# Patient Record
Sex: FEMALE | Race: WHITE | NOT HISPANIC OR LATINO | Employment: OTHER | URBAN - METROPOLITAN AREA
[De-identification: names, ages, dates, MRNs, and addresses within clinical notes are randomized per-mention and may not be internally consistent; named-entity substitution may affect disease eponyms.]

---

## 2017-01-09 ENCOUNTER — APPOINTMENT (OUTPATIENT)
Dept: LAB | Facility: CLINIC | Age: 72
End: 2017-01-09
Payer: MEDICARE

## 2017-01-09 DIAGNOSIS — K21.9 GASTRO-ESOPHAGEAL REFLUX DISEASE WITHOUT ESOPHAGITIS: ICD-10-CM

## 2017-01-09 DIAGNOSIS — E03.9 HYPOTHYROIDISM: ICD-10-CM

## 2017-01-09 DIAGNOSIS — M81.0 AGE-RELATED OSTEOPOROSIS WITHOUT CURRENT PATHOLOGICAL FRACTURE: ICD-10-CM

## 2017-01-09 DIAGNOSIS — E78.5 HYPERLIPIDEMIA: ICD-10-CM

## 2017-01-09 LAB
ALBUMIN SERPL BCP-MCNC: 3.4 G/DL (ref 3.5–5)
ALP SERPL-CCNC: 58 U/L (ref 46–116)
ALT SERPL W P-5'-P-CCNC: 28 U/L (ref 12–78)
ANION GAP SERPL CALCULATED.3IONS-SCNC: 7 MMOL/L (ref 4–13)
AST SERPL W P-5'-P-CCNC: 19 U/L (ref 5–45)
BASOPHILS # BLD AUTO: 0.01 THOUSANDS/ΜL (ref 0–0.1)
BASOPHILS NFR BLD AUTO: 0 % (ref 0–1)
BILIRUB SERPL-MCNC: 0.52 MG/DL (ref 0.2–1)
BUN SERPL-MCNC: 16 MG/DL (ref 5–25)
CALCIUM SERPL-MCNC: 9.2 MG/DL (ref 8.3–10.1)
CHLORIDE SERPL-SCNC: 106 MMOL/L (ref 100–108)
CHOLEST SERPL-MCNC: 201 MG/DL (ref 50–200)
CO2 SERPL-SCNC: 29 MMOL/L (ref 21–32)
CREAT SERPL-MCNC: 0.68 MG/DL (ref 0.6–1.3)
EOSINOPHIL # BLD AUTO: 0.12 THOUSAND/ΜL (ref 0–0.61)
EOSINOPHIL NFR BLD AUTO: 3 % (ref 0–6)
ERYTHROCYTE [DISTWIDTH] IN BLOOD BY AUTOMATED COUNT: 13.7 % (ref 11.6–15.1)
GFR SERPL CREATININE-BSD FRML MDRD: >60 ML/MIN/1.73SQ M
GLUCOSE SERPL-MCNC: 94 MG/DL (ref 65–140)
HCT VFR BLD AUTO: 39.4 % (ref 34.8–46.1)
HDLC SERPL-MCNC: 93 MG/DL (ref 40–60)
HGB BLD-MCNC: 12.8 G/DL (ref 11.5–15.4)
LDLC SERPL CALC-MCNC: 88 MG/DL (ref 0–100)
LYMPHOCYTES # BLD AUTO: 1.23 THOUSANDS/ΜL (ref 0.6–4.47)
LYMPHOCYTES NFR BLD AUTO: 32 % (ref 14–44)
MCH RBC QN AUTO: 29.8 PG (ref 26.8–34.3)
MCHC RBC AUTO-ENTMCNC: 32.5 G/DL (ref 31.4–37.4)
MCV RBC AUTO: 92 FL (ref 82–98)
MONOCYTES # BLD AUTO: 0.29 THOUSAND/ΜL (ref 0.17–1.22)
MONOCYTES NFR BLD AUTO: 8 % (ref 4–12)
NEUTROPHILS # BLD AUTO: 2.14 THOUSANDS/ΜL (ref 1.85–7.62)
NEUTS SEG NFR BLD AUTO: 57 % (ref 43–75)
NRBC BLD AUTO-RTO: 0 /100 WBCS
PLATELET # BLD AUTO: 191 THOUSANDS/UL (ref 149–390)
PMV BLD AUTO: 12.3 FL (ref 8.9–12.7)
POTASSIUM SERPL-SCNC: 3.9 MMOL/L (ref 3.5–5.3)
PROT SERPL-MCNC: 7 G/DL (ref 6.4–8.2)
RBC # BLD AUTO: 4.3 MILLION/UL (ref 3.81–5.12)
SODIUM SERPL-SCNC: 142 MMOL/L (ref 136–145)
TRIGL SERPL-MCNC: 100 MG/DL
WBC # BLD AUTO: 3.8 THOUSAND/UL (ref 4.31–10.16)

## 2017-01-09 PROCEDURE — 85025 COMPLETE CBC W/AUTO DIFF WBC: CPT

## 2017-01-09 PROCEDURE — 80061 LIPID PANEL: CPT

## 2017-01-09 PROCEDURE — 80053 COMPREHEN METABOLIC PANEL: CPT

## 2017-01-09 PROCEDURE — 36415 COLL VENOUS BLD VENIPUNCTURE: CPT

## 2017-01-16 ENCOUNTER — ALLSCRIPTS OFFICE VISIT (OUTPATIENT)
Dept: OTHER | Facility: OTHER | Age: 72
End: 2017-01-16

## 2017-04-03 ENCOUNTER — ALLSCRIPTS OFFICE VISIT (OUTPATIENT)
Dept: OTHER | Facility: OTHER | Age: 72
End: 2017-04-03

## 2017-04-03 DIAGNOSIS — Z12.31 ENCOUNTER FOR SCREENING MAMMOGRAM FOR MALIGNANT NEOPLASM OF BREAST: ICD-10-CM

## 2017-04-04 ENCOUNTER — ALLSCRIPTS OFFICE VISIT (OUTPATIENT)
Dept: OTHER | Facility: OTHER | Age: 72
End: 2017-04-04

## 2017-04-07 ENCOUNTER — GENERIC CONVERSION - ENCOUNTER (OUTPATIENT)
Dept: OTHER | Facility: OTHER | Age: 72
End: 2017-04-07

## 2017-04-10 ENCOUNTER — GENERIC CONVERSION - ENCOUNTER (OUTPATIENT)
Dept: OTHER | Facility: OTHER | Age: 72
End: 2017-04-10

## 2017-04-12 ENCOUNTER — ALLSCRIPTS OFFICE VISIT (OUTPATIENT)
Dept: OTHER | Facility: OTHER | Age: 72
End: 2017-04-12

## 2017-04-17 ENCOUNTER — ALLSCRIPTS OFFICE VISIT (OUTPATIENT)
Dept: OTHER | Facility: OTHER | Age: 72
End: 2017-04-17

## 2017-05-03 ENCOUNTER — HOSPITAL ENCOUNTER (OUTPATIENT)
Dept: RADIOLOGY | Age: 72
Discharge: HOME | End: 2017-05-03
Payer: MEDICARE

## 2017-05-03 DIAGNOSIS — Z12.31 ENCOUNTER FOR SCREENING MAMMOGRAM FOR MALIGNANT NEOPLASM OF BREAST: ICD-10-CM

## 2017-05-03 DIAGNOSIS — M81.0 AGE-RELATED OSTEOPOROSIS WITHOUT CURRENT PATHOLOGICAL FRACTURE: ICD-10-CM

## 2017-05-03 PROCEDURE — G0202 SCR MAMMO BI INCL CAD: HCPCS

## 2017-05-04 ENCOUNTER — HOSPITAL ENCOUNTER (OUTPATIENT)
Dept: RADIOLOGY | Age: 72
Discharge: HOME | End: 2017-05-04
Payer: MEDICARE

## 2017-05-04 PROCEDURE — 77080 DXA BONE DENSITY AXIAL: CPT

## 2017-05-09 ENCOUNTER — GENERIC CONVERSION - ENCOUNTER (OUTPATIENT)
Dept: OTHER | Facility: OTHER | Age: 72
End: 2017-05-09

## 2017-06-21 ENCOUNTER — ALLSCRIPTS OFFICE VISIT (OUTPATIENT)
Dept: OTHER | Facility: OTHER | Age: 72
End: 2017-06-21

## 2017-06-21 DIAGNOSIS — E03.9 HYPOTHYROIDISM: ICD-10-CM

## 2017-06-28 ENCOUNTER — APPOINTMENT (OUTPATIENT)
Dept: LAB | Facility: CLINIC | Age: 72
End: 2017-06-28
Payer: MEDICARE

## 2017-06-28 ENCOUNTER — GENERIC CONVERSION - ENCOUNTER (OUTPATIENT)
Dept: OTHER | Facility: OTHER | Age: 72
End: 2017-06-28

## 2017-06-28 DIAGNOSIS — E03.9 HYPOTHYROIDISM: ICD-10-CM

## 2017-06-28 LAB — TSH SERPL DL<=0.05 MIU/L-ACNC: 11.8 UIU/ML (ref 0.36–3.74)

## 2017-06-28 PROCEDURE — 36415 COLL VENOUS BLD VENIPUNCTURE: CPT

## 2017-06-28 PROCEDURE — 84443 ASSAY THYROID STIM HORMONE: CPT

## 2017-08-28 DIAGNOSIS — E03.9 HYPOTHYROIDISM: ICD-10-CM

## 2017-08-30 ENCOUNTER — ALLSCRIPTS OFFICE VISIT (OUTPATIENT)
Dept: OTHER | Facility: OTHER | Age: 72
End: 2017-08-30

## 2017-09-01 ENCOUNTER — APPOINTMENT (OUTPATIENT)
Dept: LAB | Facility: CLINIC | Age: 72
End: 2017-09-01
Payer: MEDICARE

## 2017-09-01 ENCOUNTER — TRANSCRIBE ORDERS (OUTPATIENT)
Dept: LAB | Facility: CLINIC | Age: 72
End: 2017-09-01

## 2017-09-01 DIAGNOSIS — E03.9 HYPOTHYROIDISM: ICD-10-CM

## 2017-09-01 DIAGNOSIS — E03.9 UNSPECIFIED HYPOTHYROIDISM: Primary | ICD-10-CM

## 2017-09-01 LAB — TSH SERPL DL<=0.05 MIU/L-ACNC: 1.15 UIU/ML (ref 0.36–3.74)

## 2017-09-01 PROCEDURE — 84443 ASSAY THYROID STIM HORMONE: CPT

## 2017-09-01 PROCEDURE — 36415 COLL VENOUS BLD VENIPUNCTURE: CPT

## 2017-09-18 ENCOUNTER — ALLSCRIPTS OFFICE VISIT (OUTPATIENT)
Dept: OTHER | Facility: OTHER | Age: 72
End: 2017-09-18

## 2017-11-02 ENCOUNTER — ALLSCRIPTS OFFICE VISIT (OUTPATIENT)
Dept: OTHER | Facility: OTHER | Age: 72
End: 2017-11-02

## 2017-11-02 DIAGNOSIS — Z91.09 OTHER ALLERGY STATUS, OTHER THAN TO DRUGS AND BIOLOGICAL SUBSTANCES: ICD-10-CM

## 2017-11-02 DIAGNOSIS — R03.0 ELEVATED BLOOD PRESSURE READING WITHOUT DIAGNOSIS OF HYPERTENSION: ICD-10-CM

## 2017-11-02 DIAGNOSIS — E78.5 HYPERLIPIDEMIA: ICD-10-CM

## 2017-11-02 DIAGNOSIS — E03.9 HYPOTHYROIDISM: ICD-10-CM

## 2017-11-03 NOTE — PROGRESS NOTES
Assessment  1  Elevated blood pressure reading (796 2) (R03 0)    Plan  Allergy to environmental factors, Elevated blood pressure reading    · (1) CBC/PLT/DIFF; Status:Active; Requested LCN:[id number];    · (1) COMPREHENSIVE METABOLIC PANEL; Status:Active; Requested QHT:42NLS7433; Allergy to environmental factors, Elevated blood pressure reading, Hyperlipidemia,  Hypothyroidism    · (1) LIPID PANEL, FASTING; Status:Active; Requested [id number];     Discussion/Summary    1  Elevated blood pressure reading - without definite diagnosis of hypertension  Patient does have a history of white coat syndrome  Unclear accuracy of home blood pressures given that she did not bleed and will sit for at least 5 minutes before taking her blood pressures  At this point, I reviewed proper blood pressure monitoring technique have asked patient to check her blood pressure once or twice a day for the next week  She will call next Thursday or Friday with these results  Further recommendations based on these results  I will have patient get labs the interim  Patient will follow up as above  Patient to call for problems or concerns in the interim  The patient was counseled regarding instructions for management,-- risk factor reductions,-- prognosis,-- patient and family education,-- impressions,-- risks and benefits of treatment options,-- importance of compliance with treatment  Possible side effects of new medications were reviewed with the patient/guardian today  The treatment plan was reviewed with the patient/guardian  The patient/guardian understands and agrees with the treatment plan      Chief Complaint  bp issues      History of Present Illness  HPI: as abovept notes that her BP has been somewhat labile recently  BP was elevated at the dentist  Pt has been taking BP at home - ranging 120-150/  Mornings are typically good  May go up a little during the day - pt does not rest for 5 min before taking BP during the day  Home machine is accurate  pt denies CP, palp lightheadedness or other CV symptoms       Review of Systems    Constitutional: as noted in HPI    ENT: no ear ache, no loss of hearing, no nosebleeds or nasal discharge, no sore throat or hoarseness  Cardiovascular: no complaints of slow or fast heart rate, no chest pain, no palpitations, no leg claudication or lower extremity edema  Respiratory: no complaints of shortness of breath, no wheezing, no dyspnea on exertion, no orthopnea or PND  Gastrointestinal: no complaints of abdominal pain, no constipation, no nausea or diarrhea, no vomiting, no bloody stools  Genitourinary: no complaints of dysuria, no incontinence, no pelvic pain, no dysmenorrhea, no vaginal discharge or abnormal vaginal bleeding  Active Problems  1  Acute sinusitis (461 9) (J01 90)   2  Aftercare involving the use of plastic surgery (V51 8) (Z09)   3  Allergy to environmental factors (V15 09) (Z91 09)   4  Basal cell carcinoma of nasal tip (173 31) (C44 311)   5  Elevated blood pressure reading (796 2) (R03 0)   6  Encounter for gynecological examination (general) (routine) without abnormal findings   (V72 31) (Z01 419)   7  Encounter for routine gynecological examination with Papanicolaou smear of cervix   (V72 31,V76 2) (Z01 419)   8  Encounter for screening mammogram for malignant neoplasm of breast (V76 12)   (Z12 31)   9  Esophageal reflux (530 81) (K21 9)   10  Head congestion (478 19) (R09 81)   11  History of allergy (V15 09) (Z88 9)   12  History of Moh's micrographic surgery for skin cancer (V10 83) (Z85 828,Z98 890)   13  Hyperlipidemia (272 4) (E78 5)   14  Hypothyroidism (244 9) (E03 9)   15  Immunization due (V05 9) (Z23)   16  Malignant neoplasm without specification of site (199 1) (C80 1)   17  Medicare annual wellness visit, initial (V70 0) (Z00 00)   18  Medicare annual wellness visit, subsequent (V70 0) (Z00 00)   19  Need for influenza vaccination (V04 81) (Z23)   20  Nontoxic single thyroid nodule (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) (E04 1)   21  Osteoporosis (733 00) (M81 0)   22  Pruritus (698 9) (L29 9)   23  Screening for genitourinary condition (V81 6) (Z13 89)   24  Screening for neurological condition (V80 09) (Z13 89)   25  Sinusitis (473 9) (J32 9)   26  White coat syndrome with high blood pressure without hypertension (796 2) (R03 0)    Past Medical History  1  History of  0 (V49 89)   2  History of Hay fever (477 9) (J30 1)   3  History of acute sinusitis (V12 69) (Z87 09)   4  History of thyroid disease (V12 29) (Z86 39)  Active Problems And Past Medical History Reviewed: The active problems and past medical history were reviewed and updated today  Family History  Mother    1  Family history of Cancer   2  Family history of Colon Cancer (V16 0)  Father    3  Family history of Cancer   4  Family history of Carcinoma Of The Pancreas   5  Family history of Thyroid Cancer  Paternal Grandmother    10  Family history of Coronary Artery Disease (V17 49)    Social History   · Alcohol Use (History)   · Being A Social Drinker   · Daily Coffee Consumption (1  Cups/Day)   · Daily Cola Consumption (___ Cans/Day)   · Daily Tea Consumption (1  Cups/Day)   · Former smoker (V15 82) (Z87 891)   · Denied: History of H/O domestic violence   · Marital History - Currently    · No drug use   · Uses Safety Equipment - Seatbelts  The social history was reviewed and updated today  Surgical History  1  History Of Prior Surgery   2  History of Tonsillectomy    Current Meds   1  Atorvastatin Calcium 10 MG Oral Tablet; Take 1 tablet daily; Therapy: 95HAP1354 to (Last Rx:2017)  Requested for: 2017 Ordered   2  BL Vitamin C 500 MG TABS; Take 1 tablet daily; Therapy: (Recorded:87Dmm6766) to Recorded   3  Citracal Calcium+D TB24; take 2 tablet daily; Therapy: (Recorded:2017) to Recorded   4   Ibandronate Sodium 150 MG Oral Tablet; TAKE 1 TABLET ONCE A MONTH WITH 8 TO   10 OUNCES OF WATER STAY UPRIGHT AND DO NOT EAT OR DRINK FOR 1 HOUR;   Therapy: 77ALE0715 to (Last JN:93CGA4778)  Requested for: 51YPR3264 Ordered   5  Levothyroxine Sodium 100 MCG Oral Tablet; TAKE 1 TABLET DAILY; Therapy: 86UAP1079 to ()  Requested for: 86ETY2301; Last   Rx:33Pll7847 Ordered   6  Levothyroxine Sodium 88 MCG Oral Tablet; Take 1 tablet daily; Therapy: 61AWR4956 to (Last Zaira Sartorius)  Requested for: 39Xix0285 Ordered   7  Mometasone Furoate 50 MCG/ACT Nasal Suspension; USE 2 SPRAYS IN EACH   NOSTRIL EVERY MORNING; Therapy: 50MBE9053 to (Last Rx:74Dvn6610)  Requested for: 61Jpn6507 Ordered   8  Montelukast Sodium 10 MG Oral Tablet; Take 1 tablet daily; Therapy: 33SQW3403 to (Last Rx:03Bhg9101)  Requested for: 07YUR4597 Ordered   9  Multivitamins Oral Capsule; Therapy: (Recorded:15Cyj9063) to Recorded   10  Omeprazole 40 MG Oral Capsule Delayed Release; take 1 capsule daily; Therapy: 88CSG1304 to (Last Rx:83Xki6310)  Requested for: 51Flv4968 Ordered   11  Zyrtec TABS; Therapy: (Recorded:80Mcc7145) to Recorded    The medication list was reviewed and updated today  Allergies  1  Amoxicillin TABS   2  Avelox TABS    Vitals   Recorded: 17RHE2430 01:36PM   Temperature 98 5 F   Heart Rate 70   Systolic 246   Diastolic 82   Height 5 ft 3 in   Weight 166 lb 8 oz   BMI Calculated 29 49   BSA Calculated 1 79     Physical Exam    Constitutional   General appearance: No acute distress, well appearing and well nourished  Eyes   Conjunctiva and lids: No swelling, erythema or discharge  Pupils and irises: Equal, round and reactive to light  Ears, Nose, Mouth, and Throat   External inspection of ears and nose: Normal     Otoscopic examination: Tympanic membranes translucent with normal light reflex  Canals patent without erythema  Nasal mucosa, septum, and turbinates: Normal without edema or erythema  Oropharynx: Normal with no erythema, edema, exudate or lesions      Pulmonary Respiratory effort: No increased work of breathing or signs of respiratory distress  Auscultation of lungs: Clear to auscultation  Cardiovascular   Auscultation of heart: Normal rate and rhythm, normal S1 and S2, without murmurs  Examination of extremities for edema and/or varicosities: Normal     Carotid pulses: Normal     Lymphatic   Palpation of lymph nodes in neck: No lymphadenopathy  Musculoskeletal   Gait and station: Normal          Future Appointments    Date/Time Provider Specialty Site   01/16/2018 10:15 AM YOKO Yang   [address]     Signatures   Electronically signed by : YOKO Ramirez ; Nov 2 2017  6:25PM EST                       (Author)

## 2017-11-07 ENCOUNTER — LAB (OUTPATIENT)
Dept: LAB | Facility: CLINIC | Age: 72
End: 2017-11-07
Payer: MEDICARE

## 2017-11-07 DIAGNOSIS — E03.9 HYPOTHYROIDISM: ICD-10-CM

## 2017-11-07 DIAGNOSIS — R03.0 ELEVATED BLOOD PRESSURE READING WITHOUT DIAGNOSIS OF HYPERTENSION: ICD-10-CM

## 2017-11-07 DIAGNOSIS — Z91.09 OTHER ALLERGY STATUS, OTHER THAN TO DRUGS AND BIOLOGICAL SUBSTANCES: ICD-10-CM

## 2017-11-07 DIAGNOSIS — E78.5 HYPERLIPIDEMIA: ICD-10-CM

## 2017-11-07 LAB
ALBUMIN SERPL BCP-MCNC: 3.8 G/DL (ref 3.5–5)
ALP SERPL-CCNC: 58 U/L (ref 46–116)
ALT SERPL W P-5'-P-CCNC: 24 U/L (ref 12–78)
ANION GAP SERPL CALCULATED.3IONS-SCNC: 3 MMOL/L (ref 4–13)
AST SERPL W P-5'-P-CCNC: 17 U/L (ref 5–45)
BASOPHILS # BLD AUTO: 0.01 THOUSANDS/ΜL (ref 0–0.1)
BASOPHILS NFR BLD AUTO: 0 % (ref 0–1)
BILIRUB SERPL-MCNC: 0.71 MG/DL (ref 0.2–1)
BUN SERPL-MCNC: 15 MG/DL (ref 5–25)
CALCIUM SERPL-MCNC: 9.8 MG/DL (ref 8.3–10.1)
CHLORIDE SERPL-SCNC: 106 MMOL/L (ref 100–108)
CHOLEST SERPL-MCNC: 211 MG/DL (ref 50–200)
CO2 SERPL-SCNC: 29 MMOL/L (ref 21–32)
CREAT SERPL-MCNC: 0.71 MG/DL (ref 0.6–1.3)
EOSINOPHIL # BLD AUTO: 0.07 THOUSAND/ΜL (ref 0–0.61)
EOSINOPHIL NFR BLD AUTO: 2 % (ref 0–6)
ERYTHROCYTE [DISTWIDTH] IN BLOOD BY AUTOMATED COUNT: 13.1 % (ref 11.6–15.1)
GFR SERPL CREATININE-BSD FRML MDRD: 87 ML/MIN/1.73SQ M
GLUCOSE P FAST SERPL-MCNC: 99 MG/DL (ref 65–99)
HCT VFR BLD AUTO: 40.2 % (ref 34.8–46.1)
HDLC SERPL-MCNC: 87 MG/DL (ref 40–60)
HGB BLD-MCNC: 13 G/DL (ref 11.5–15.4)
LDLC SERPL CALC-MCNC: 103 MG/DL (ref 0–100)
LYMPHOCYTES # BLD AUTO: 1.35 THOUSANDS/ΜL (ref 0.6–4.47)
LYMPHOCYTES NFR BLD AUTO: 33 % (ref 14–44)
MCH RBC QN AUTO: 29.7 PG (ref 26.8–34.3)
MCHC RBC AUTO-ENTMCNC: 32.3 G/DL (ref 31.4–37.4)
MCV RBC AUTO: 92 FL (ref 82–98)
MONOCYTES # BLD AUTO: 0.29 THOUSAND/ΜL (ref 0.17–1.22)
MONOCYTES NFR BLD AUTO: 7 % (ref 4–12)
NEUTROPHILS # BLD AUTO: 2.31 THOUSANDS/ΜL (ref 1.85–7.62)
NEUTS SEG NFR BLD AUTO: 58 % (ref 43–75)
NRBC BLD AUTO-RTO: 0 /100 WBCS
PLATELET # BLD AUTO: 213 THOUSANDS/UL (ref 149–390)
PMV BLD AUTO: 11.8 FL (ref 8.9–12.7)
POTASSIUM SERPL-SCNC: 4.5 MMOL/L (ref 3.5–5.3)
PROT SERPL-MCNC: 7.4 G/DL (ref 6.4–8.2)
RBC # BLD AUTO: 4.38 MILLION/UL (ref 3.81–5.12)
SODIUM SERPL-SCNC: 138 MMOL/L (ref 136–145)
TRIGL SERPL-MCNC: 107 MG/DL
WBC # BLD AUTO: 4.04 THOUSAND/UL (ref 4.31–10.16)

## 2017-11-07 PROCEDURE — 80053 COMPREHEN METABOLIC PANEL: CPT

## 2017-11-07 PROCEDURE — 80061 LIPID PANEL: CPT

## 2017-11-07 PROCEDURE — 36415 COLL VENOUS BLD VENIPUNCTURE: CPT

## 2017-11-07 PROCEDURE — 85025 COMPLETE CBC W/AUTO DIFF WBC: CPT

## 2017-12-11 ENCOUNTER — ALLSCRIPTS OFFICE VISIT (OUTPATIENT)
Dept: OTHER | Facility: OTHER | Age: 72
End: 2017-12-11

## 2017-12-12 NOTE — PROGRESS NOTES
Assessment    1  Acute sinusitis (461 9) (J01 90)    Plan  Acute sinusitis    · Amoxicillin-Pot Clavulanate 875-125 MG Oral Tablet; TAKE 1 TABLET EVERY 12HOURS DAILY   · PredniSONE 20 MG Oral Tablet; 1 tab PO BID    Discussion/Summary    #1 acute sinusitis - I reviewed with pt  REC: start augmentin 875 bid with pred 20 mg bid x 5 days  Recheck 1 week if no change - earlier if worse  Pt to call for problems or concerns in the interim  The patient was counseled regarding instructions for management,-- risk factor reductions,-- prognosis,-- patient and family education,-- impressions,-- risks and benefits of treatment options,-- importance of compliance with treatment  Possible side effects of new medications were reviewed with the patient/guardian today  The treatment plan was reviewed with the patient/guardian  The patient/guardian understands and agrees with the treatment plan      Chief Complaint    1  Cold Symptoms    History of Present Illness  HPI: as abovept with R ear congestion and intermittent sinus pain x 4-6 weeks  Pt finished a course of Amox and was feeling better (not resolved) but symptoms returned Sunday  (+) chills and fatigue  No productive cough  Review of Systems   Constitutional: as noted in HPI   ENT: as noted in HPI  Cardiovascular: no complaints of slow or fast heart rate, no chest pain, no palpitations, no leg claudication or lower extremity edema  Respiratory: no complaints of shortness of breath, no wheezing, no dyspnea on exertion, no orthopnea or PND  Musculoskeletal: no complaints of arthralgia, no myalgia, no joint swelling or stiffness, no limb pain or swelling  Integumentary: no complaints of skin rash or lesion, no itching or dry skin, no skin wounds  Active Problems    1  Acute sinusitis (461 9) (J01 90)   2  Aftercare involving the use of plastic surgery (V51 8) (Z09)   3  Allergy to environmental factors (V15 09) (Z91 09)   4   Basal cell carcinoma of nasal tip (173 31) (C44 311)   5  Elevated blood pressure reading (796 2) (R03 0)   6  Encounter for gynecological examination (general) (routine) without abnormal findings (V72 31) (Z01 419)   7  Encounter for routine gynecological examination with Papanicolaou smear of cervix (V72 31,V76 2) (Z01 419)   8  Encounter for screening mammogram for malignant neoplasm of breast (V76 12) (Z12 31)   9  Esophageal reflux (530 81) (K21 9)   10  Flu vaccine need (V04 81) (Z23)   11  Head congestion (478 19) (R09 81)   12  History of allergy (V15 09) (Z88 9)   13  History of Moh's micrographic surgery for skin cancer (V10 83) (Z85 828,Z98 890)   14  Hyperlipidemia (272 4) (E78 5)   15  Hypothyroidism (244 9) (E03 9)   16  Immunization due (V05 9) (Z23)   17  Malignant neoplasm without specification of site (199 1) (C80 1)   18  Medicare annual wellness visit, initial (V70 0) (Z00 00)   19  Medicare annual wellness visit, subsequent (V70 0) (Z00 00)   20  Need for influenza vaccination (V04 81) (Z23)   21  Nontoxic single thyroid nodule (241 0) (E04 1)   22  Osteoporosis (733 00) (M81 0)   23  Pruritus (698 9) (L29 9)   24  Screening for genitourinary condition (V81 6) (Z13 89)   25  Screening for neurological condition (V80 09) (Z13 89)   26  Sinusitis (473 9) (J32 9)   27  White coat syndrome with high blood pressure without hypertension (796 2) (R03 0)    Past Medical History  1  History of  0 (V49 89)   2  History of Hay fever (477 9) (J30 1)   3  History of acute sinusitis (V12 69) (Z87 09)   4  History of thyroid disease (V12 29) (Z86 39)  Active Problems And Past Medical History Reviewed: The active problems and past medical history were reviewed and updated today  Family History  Mother    1  Family history of Cancer   2  Family history of Colon Cancer (V16 0)  Father    3  Family history of Cancer   4  Family history of Carcinoma Of The Pancreas   5  Family history of Thyroid Cancer  Paternal Grandmother    10  Family history of Coronary Artery Disease (V17 49)    Social History   · Alcohol Use (History)   · Being A Social Drinker   · Daily Coffee Consumption (1  Cups/Day)   · Daily Cola Consumption (___ Cans/Day)   · Daily Tea Consumption (1  Cups/Day)   · Former smoker (V15 82) (Z87 501)   · Denied: History of H/O domestic violence   · Marital History - Currently    · No drug use   · Uses Safety Equipment - Seatbelts  The social history was reviewed and updated today  Surgical History    1  History Of Prior Surgery   2  History of Tonsillectomy    Current Meds   1  Atorvastatin Calcium 10 MG Oral Tablet; Take 1 tablet daily; Therapy: 50QFV3874 to (Last Rx:45Kiv2197)  Requested for: 94Dyx0387 Ordered   2  BL Vitamin C 500 MG TABS; Take 1 tablet daily; Therapy: (Recorded:77Lrl7670) to Recorded   3  Citracal Calcium+D TB24; take 2 tablet daily; Therapy: (Recorded:87Blv9455) to Recorded   4  Ibandronate Sodium 150 MG Oral Tablet; TAKE 1 TABLET ONCE A MONTH WITH 8 TO 10 OUNCES OF WATER STAY UPRIGHT AND DO NOT EAT OR DRINK FOR 1 HOUR; Therapy: 70MNG1184 to (Last UI:67UMT4258)  Requested for: 36FSY9276 Ordered   5  Levothyroxine Sodium 100 MCG Oral Tablet; Take 1 tablet daily; Therapy: 47BTV3263 to (Last GREGORY:69WZY9093)  Requested for: 61JZQ8055 Ordered   6  Levothyroxine Sodium 88 MCG Oral Tablet; Take 1 tablet daily; Therapy: 85ITS1975 to (Last [facility])  Requested for: 28Eau7132 Ordered   7  Mometasone Furoate 50 MCG/ACT Nasal Suspension; USE 2 SPRAYS IN EACH NOSTRIL EVERY MORNING; Therapy: 60ONA9658 to (Last 781 1704)  Requested for: W9442949 Ordered   8  Montelukast Sodium 10 MG Oral Tablet; Take 1 tablet daily; Therapy: 98NIV5218 to (Last Rx:59Jar8398)  Requested for: 59YVZ1684 Ordered   9  Multivitamins Oral Capsule; Therapy: (Recorded:12Uzt1217) to Recorded   10  Omeprazole 40 MG Oral Capsule Delayed Release; take 1 capsule daily;   Therapy: 41YBQ6642 to (Last Rx:74Quk8644)  Requested for: 20Zqz5018 Ordered   11  Zyrtec TABS; Therapy: (Recorded:17Kaq9868) to Recorded    The medication list was reviewed and updated today  Allergies  1  Amoxicillin TABS   2  Avelox TABS    Vitals   Recorded: 50Ulv5479 03:27PM   Temperature 98 5 F   Heart Rate 64   Systolic 996   Diastolic 70   Height 5 ft 3 in   Weight 167 lb 4 oz   BMI Calculated 29 63   BSA Calculated 1 79       Physical Exam   Constitutional  General appearance: No acute distress, well appearing and well nourished  Eyes  Conjunctiva and lids: No swelling, erythema or discharge  Pupils and irises: Equal, round and reactive to light  Ears, Nose, Mouth, and Throat  External inspection of ears and nose: Normal    Otoscopic examination: Tympanic membranes translucent with normal light reflex  Canals patent without erythema  Nasal mucosa, septum, and turbinates: Abnormal  -- nose congested with scant thick discharge  Maxillary sinuses TTP  Oropharynx: Normal with no erythema, edema, exudate or lesions  Pulmonary  Respiratory effort: No increased work of breathing or signs of respiratory distress  Auscultation of lungs: Clear to auscultation  Cardiovascular  Auscultation of heart: Normal rate and rhythm, normal S1 and S2, without murmurs  Lymphatic  Palpation of lymph nodes in neck: No lymphadenopathy  Future Appointments    Date/Time Provider Specialty Site   01/16/2018 10:15 AM [facility]OKO Romo   [address] [facility] [place]       Signatures   Electronically signed by : YOKO Sorensen ; Dec 11 2017  4:09PM EST                       (Author)

## 2018-01-10 NOTE — RESULT NOTES
Verified Results  (1) TSH 28Jun2017 11:13AM Angel Bhatia Order Number: [id number]     Test Name Result Flag Reference   TSH 11 800 uIU/mL H 0 358-3 740   Patients undergoing fluorescein dye angiography may retain small amounts of fluorescein in the body for 48-72 hours post procedure  Samples containing fluorescein can produce falsely depressed TSH values  If the patient had this procedure,a specimen should be resubmitted post fluorescein clearance            The recommended reference ranges for TSH during pregnancy are as follows:  First trimester 0 1 to 2 5 uIU/mL  Second trimester  0 2 to 3 0 uIU/mL  Third trimester 0 3 to 3 0 uIU/m

## 2018-01-13 VITALS
WEIGHT: 166 LBS | DIASTOLIC BLOOD PRESSURE: 104 MMHG | HEART RATE: 68 BPM | HEIGHT: 63 IN | TEMPERATURE: 96.5 F | BODY MASS INDEX: 29.41 KG/M2 | SYSTOLIC BLOOD PRESSURE: 172 MMHG

## 2018-01-13 VITALS
WEIGHT: 166 LBS | HEIGHT: 63 IN | DIASTOLIC BLOOD PRESSURE: 98 MMHG | SYSTOLIC BLOOD PRESSURE: 180 MMHG | BODY MASS INDEX: 29.41 KG/M2

## 2018-01-13 VITALS
HEART RATE: 70 BPM | SYSTOLIC BLOOD PRESSURE: 142 MMHG | DIASTOLIC BLOOD PRESSURE: 82 MMHG | HEIGHT: 63 IN | BODY MASS INDEX: 29.5 KG/M2 | TEMPERATURE: 98.5 F | WEIGHT: 166.5 LBS

## 2018-01-13 NOTE — RESULT NOTES
Verified Results  * DXA BONE DENSITY SPINE HIP AND PELVIS 05WWM0413 09:32AM Ranjana Fischer Order Number: [id number]    - Patient Instructions: To schedule this appointment, please contact Central Scheduling at [phone]  Test Name Result Flag Reference   DXA BONE DENSITY SPINE HIP AND PELVIS (Report)     CENTRAL DXA SCAN     CLINICAL HISTORY:  73year old post-menopausal  female with history of hypothyroidism and gastroesophageal reflux disease with use of antireflux medication  The patient walks and takes calcium and vitamin D supplements  She took Fosamax and    Boniva in the past      TECHNIQUE: Bone densitometry was performed using a Hologic Horizon A bone densitometer  Regions of interest appear properly placed  There is mild levoscoliosis of the lumbar spine, with associated degenerative change  COMPARISON: June 27, 2014 and before     RESULTS:    LUMBAR SPINE: L1-L4:   BMD 0 795 gm/cm2   T-score -2 3   Z-score -0 2     LEFT TOTAL HIP:   BMD 0 853 gm/cm2   T-score -0 6   Z-score 0 8     LEFT FEMORAL NECK:   BMD 0 637 gm/cm2   T-score -1 9   Z-score -0 1             IMPRESSION:   1  Based on the [facility] classification, the T-score of -2 3 in the lumbar spine is consistent with low bone mineral density  2  Since the prior study, there has been a significant decrease in BMD in the left hip of 0 021 gm/cm2 or 2 4%, with no significant change in the lumbar spine  This change does exceed our own least significant change and, therefore, is statistically    significant within 95% confidence level  3  Any secondary causes of low bone mineral density should be excluded prior to treatment, if clinically indicated  4  A daily intake of at least 1200 mg calcium and 800 to 1000 IU of Vitamin D, as well as weight bearing and muscle strengthening exercise, fall prevention and avoidance of tobacco and excessive alcohol intake as basic preventive measures are suggested     5  Repeat DXA in 18 - 24 months, on the same machine, as clinically indicated  The 10 year risk of hip fracture is 1 8%, with the 10 year risk of major osteoporotic fracture being 11%, as calculated by the [facility] fracture risk assessment tool (FRAX)  The current NOF guidelines recommend treating patients with FRAX 10 year risk score    of >3% for hip fracture and >20% for major osteoporotic fracture        WHO CLASSIFICATION:   Normal (a T-score of -1 0 or higher)   Low bone mineral density (a T-score of less than -1 0 but higher than -2 5)   Osteoporosis (a T-score of -2 5 or less)   Severe osteoporosis (a T-score of -2 5 or less with a fragility fracture)             Workstation performed: [id number]     Signed by:   Darwin Mcclure MD   5/4/17

## 2018-01-14 VITALS
HEART RATE: 84 BPM | WEIGHT: 164.38 LBS | DIASTOLIC BLOOD PRESSURE: 76 MMHG | SYSTOLIC BLOOD PRESSURE: 154 MMHG | HEIGHT: 63 IN | BODY MASS INDEX: 29.12 KG/M2 | RESPIRATION RATE: 20 BRPM | TEMPERATURE: 96.2 F

## 2018-01-15 NOTE — PROGRESS NOTES
Chief Complaint  patient was seen today for a blood pressure check because at her last visit it was elevated, so she came in today with her machine and we got 137/97 and i got 140/80  She isn't on any medication for her pressure and her reading at home are in normal range  So she will continue to monitor and follow up at her next well check  Active Problems    1  Acute sinusitis (461 9) (J01 90)   2  Basal cell carcinoma of nasal tip (173 31) (C44 311)   3  Elevated blood pressure reading (796 2) (R03 0)   4  Encounter for routine gynecological examination with Papanicolaou smear of cervix   (V72 31,V76 2) (Z01 419)   5  Encounter for screening mammogram for malignant neoplasm of breast (V76 12)   (Z12 31)   6  Esophageal reflux (530 81) (K21 9)   7  Head congestion (478 19) (R09 81)   8  History of allergy (V15 09) (Z88 9)   9  Hyperlipidemia (272 4) (E78 5)   10  Hypothyroidism (244 9) (E03 9)   11  Immunization due (V05 9) (Z23)   12  Malignant neoplasm without specification of site (199 1) (C80 1)   13  Medicare annual wellness visit, initial (V70 0) (Z00 00)   14  Medicare annual wellness visit, subsequent (V70 0) (Z00 00)   15  Need for influenza vaccination (V04 81) (Z23)   16  Nontoxic single thyroid nodule (241 0) (E04 1)   17  Osteoporosis (733 00) (M81 0)   18  Screening for genitourinary condition (V81 6) (Z13 89)   19  Screening for neurological condition (V80 09) (Z13 89)   20  Sinusitis (473 9) (J32 9)    Current Meds   1  Atorvastatin Calcium 10 MG Oral Tablet; Take 1 tablet daily; Therapy: 88QNT4307 to (Last Rx:14Jan2016)  Requested for: 67EYA8291 Ordered   2  BL Vitamin C 500 MG TABS; Take 1 tablet daily; Therapy: (Recorded:69Qiw0200) to Recorded   3  Calcium 600+D 600-200 MG-UNIT Oral Tablet; Take 1 tablet daily; Therapy: (Recorded:93Zfr4114) to Recorded   4  Cefdinir 300 MG Oral Capsule; TAKE 1 CAPSULE EVERY 12 HOURS DAILY;    Therapy: 66EME1623 to (Evaluate:16Kab4299)  Requested for: 04YVX4175; Last   Rx:27Nov2016 Ordered   5  Ibandronate Sodium 150 MG Oral Tablet; TAKE 1 TABLET ONCE A MONTH WITH 8 TO   10 OUNCES OF WATER STAY UPRIGHT AND DO NOT EAT OR DRINK FOR 1 HOUR;   Therapy: 26XAU7724 to (Last Rx:03Nov2015)  Requested for: 24LRT3692 Ordered   6  Levothyroxine Sodium 88 MCG Oral Tablet; Take 1 tablet daily  Requested for:   25OKU8549; Last Rx:95Rdi7602 Ordered   7  Montelukast Sodium 10 MG Oral Tablet; TAKE 1 TABLET BY MOUTH EVERY DAY; Therapy: 60RZA1567 to (Evaluate:15Jun2017)  Requested for: 20Jun2016; Last   Rx:20Jun2016 Ordered   8  Multivitamins Oral Capsule; Therapy: (Recorded:67Hao0757) to Recorded   9  Nasonex 50 MCG/ACT Nasal Suspension; USE 2 SPRAYS IN EACH NOSTRIL EVERY   MORNING; Therapy: 40TNG4445 to (Last Rx:04Mar2016)  Requested for: 05QAZ9623 Ordered   10  Omeprazole 40 MG Oral Capsule Delayed Release; take 1 capsule daily; Therapy: 32EAP3485 to (Last Rx:14Jan2016)  Requested for: 35KDU8782 Ordered   11  Zyrtec TABS; Therapy: (Recorded:65Ibj9492) to Recorded    Allergies    1  Avelox TABS    Vitals  Signs    Systolic: 989  Diastolic: 80    Assessment    1  Elevated blood pressure reading (796 2) (R03 0)    Future Appointments    Date/Time Provider Specialty Site   01/16/2017 01:00 PM Ginette [facility] Plastic Surgery BODY EVOLUTION PLASTIC RECON RIVERS   06/21/2017 08:30 AM [facility] Calloway  [address] [facility]     Signatures   Electronically signed by :  Jelena Quiroga, ; Dec 14 2016 11:44AM EST                       (Author)    Electronically signed by : YOKO Jules ; Dec 14 2016  2:16PM EST                       (Author)

## 2018-01-15 NOTE — CONSULTS
Assessment    1  Basal cell carcinoma of nasal tip (173 31) (C44 311)    Discussion/Summary  Discussion Summary:   Please see HPI  The pt is scheduled for surgery on 10/4/16 @ [place]  We discussed our portion of the reconstruction process which would likely be done using a bilobed flap  We talked about the procedure, how it would be performed, as well as the potential risks, complications, and limitations  All questions were answered & consent was obtained  Photos were taken as well  We did also make her first post op appt as well for 1 week after surgery  Counseling Documentation With Imm: The patient, patient's family was counseled regarding diagnostic results, instructions for management, prognosis, risks and benefits of treatment options  total time of encounter was 25 minutes and 15 minutes was spent counseling  History of Present Illness  HPI: Antonio Proctor is a 74 y/o M who presents in referral from Dr Esperanza Kirby for evaluation of a BCC of the nasal tip that has been present x 1-2 years  It appeared as a nonhealing pimple and was recently biopsied at her derm's office showing [address]  Review of Systems  Complete-Female:   Constitutional: no fever and no chills  Eyes: no eyesight problems  ENT: no hearing loss  Cardiovascular: no chest pain  Respiratory: no shortness of breath  Gastrointestinal: no abdominal pain, no nausea and no diarrhea  Musculoskeletal: no joint swelling and no joint stiffness  Integumentary: no rashes and no skin wound  Neurological: no headache and no numbness  Hematologic/Lymphatic: no tendency for easy bleeding and no tendency for easy bruising  ROS Reviewed:   ROS reviewed  Active Problems    1  Acute sinusitis (461 9) (J01 90)   2  Encounter for routine gynecological examination with Papanicolaou smear of cervix   (V72 31,V76 2) (Z01 419)   3  Encounter for screening mammogram for malignant neoplasm of breast (V76 12)   (Z12 31)   4   Esophageal reflux (530 81) (K21 9)   5  Head congestion (478 19) (R09 81)   6  History of allergy (V15 09) (Z88 9)   7  Hyperlipidemia (272 4) (E78 5)   8  Hypothyroidism (244 9) (E03 9)   9  Malignant neoplasm without specification of site (199 1) (C80 1)   10  Medicare annual wellness visit, initial (V70 0) (Z00 00)   11  Need for influenza vaccination (V04 81) (Z23)   12  Nontoxic single thyroid nodule (241 0) (E04 1)   13  Osteoporosis (733 00) (M81 0)   14  Screening for genitourinary condition (V81 6) (Z13 89)   15  Screening for neurological condition (V80 09) (Z13 89)   16  Sinusitis (473 9) (J32 9)    Past Medical History    1  History of Hay fever (477 9) (J30 1)   2  History of thyroid disease (V12 29) (Z86 39)  Active Problems And Past Medical History Reviewed: The active problems and past medical history were reviewed and updated today  Surgical History    1  History Of Prior Surgery   2  History of Tonsillectomy  Surgical History Reviewed: The surgical history was reviewed and updated today  Family History  Mother    1  Family history of Cancer   2  Family history of Colon Cancer (V16 0)  Father    3  Family history of Cancer   4  Family history of Carcinoma Of The Pancreas   5  Family history of Thyroid Cancer  Paternal Grandmother    10  Family history of Coronary Artery Disease (V17 49)  Family History Reviewed: The family history was reviewed and updated today  Social History    · Alcohol Use (History)   · Being A Social Drinker   · Daily Coffee Consumption (1  Cups/Day)   · Daily Cola Consumption (___ Cans/Day)   · Daily Tea Consumption (1  Cups/Day)   · Former smoker (V15 82) (Z87 891)   · Denied: History of H/O domestic violence   · Marital History - Currently    · No drug use   · Uses Safety Equipment - Seatbelts  Social History Reviewed: The social history was reviewed and updated today  The social history was reviewed and is unchanged  Current Meds   1   Atorvastatin Calcium 10 MG Oral Tablet; Take 1 tablet daily; Therapy: 70GEE3704 to (Last Rx:14Jan2016)  Requested for: 46ZFX3360 Ordered   2  BL Vitamin C 500 MG TABS; Take 1 tablet daily; Therapy: (Recorded:18May2016) to Recorded   3  Calcium 600+D 600-200 MG-UNIT Oral Tablet; Take 1 tablet daily; Therapy: (Recorded:18May2016) to Recorded   4  Ibandronate Sodium 150 MG Oral Tablet; TAKE 1 TABLET ONCE A MONTH WITH 8 TO 10   OUNCES OF WATER STAY UPRIGHT AND DO NOT EAT OR DRINK FOR 1 HOUR;   Therapy: 48YGY5023 to (Last Rx:03Nov2015)  Requested for: 21ZCB8602 Ordered   5  Levothyroxine Sodium 88 MCG Oral Tablet; Take 1 tablet daily  Requested for: 28NBJ2962;   Last Rx:01Qbu3303 Ordered   6  Montelukast Sodium 10 MG Oral Tablet; TAKE 1 TABLET BY MOUTH EVERY DAY; Therapy: 53FWW2116 to (Evaluate:15Jun2017)  Requested for: 20Jun2016; Last   Rx:20Jun2016 Ordered   7  Multivitamins Oral Capsule; Therapy: (Recorded:18May2016) to Recorded   8  Nasonex 50 MCG/ACT Nasal Suspension; USE 2 SPRAYS IN EACH NOSTRIL EVERY   MORNING; Therapy: 85ADA7594 to (Last Rx:04Mar2016)  Requested for: 82ICY2281 Ordered   9  Omeprazole 40 MG Oral Capsule Delayed Release; take 1 capsule daily; Therapy: 76BGU2549 to (Last Rx:14Jan2016)  Requested for: 22VIN9819 Ordered   10  Zyrtec TABS; Therapy: (Recorded:18May2016) to Recorded  Medication List Reviewed: The medication list was reviewed and updated today  Allergies    1  Avelox TABS    Physical Exam  General Complete Exam (Brief) [facility]:   Constitutional   General appearance: No acute distress, well appearing and well nourished  Eyes   Conjunctiva and lids: No swelling, erythema or discharge  Ears, Nose, Mouth, and Throat   External inspection of ears and nose: Normal     Pulmonary   Respiratory effort: No increased work of breathing or signs of respiratory distress  Cardiovascular   Auscultation of heart: Normal rate and rhythm, normal S1 and S2, without murmurs      Skin Well healed bx site nasal tip about 4 mm in diam       Future Appointments    Date/Time Provider Specialty Site   10/11/2016 11:00 AM Camilo Trevino [facility] Plastic Surgery BODY EVOLUTION PLASTIC RECONS [facility]   11/23/2016 09:00 AM YOKO Plata  [address]     Signatures   Electronically signed by : Zaida Marlow [facility];  Aug 16 2016  4:28PM EST                       (Author)    Electronically signed by : YOKO Price ; Aug 26 2016  4:34PM EST

## 2018-01-15 NOTE — CONSULTS
I had the pleasure of evaluating your patient, Belle Bailey  My full evaluation follows:      Chief Complaint  1 year s/p MOHs with bilobed flap for BCC  History of Present Illness  Ace George is a pleasant 72-year-old female who is 1 year status post left nasal tip MOHs with Dr Bridgette Rincon for [facility] with bilobed flap reconstruction  She does note some minimal scarring that she is conscious of but, she is overall pleased with her results  Review of Systems    Integumentary: history of BCC , but as noted in HPI, no rashes, no skin lesions and no skin wound  Active Problems    1  Acute sinusitis (461 9) (J01 90)   2  Allergy to environmental factors (V15 09) (Z91 09)   3  Basal cell carcinoma of nasal tip (173 31) (C44 311)   4  Elevated blood pressure reading (796 2) (R03 0)   5  Encounter for gynecological examination (general) (routine) without abnormal findings   (V72 31) (Z01 419)   6  Encounter for routine gynecological examination with Papanicolaou smear of cervix   (V72 31,V76 2) (Z01 419)   7  Encounter for screening mammogram for malignant neoplasm of breast (V76 12)   (Z12 31)   8  Esophageal reflux (530 81) (K21 9)   9  Head congestion (478 19) (R09 81)   10  History of allergy (V15 09) (Z88 9)   11  History of Moh's micrographic surgery for skin cancer (V10 83) (Z85 828,Z98 890)   12  Hyperlipidemia (272 4) (E78 5)   13  Hypothyroidism (244 9) (E03 9)   14  Immunization due (V05 9) (Z23)   15  Malignant neoplasm without specification of site (199 1) (C80 1)   16  Medicare annual wellness visit, initial (V70 0) (Z00 00)   17  Medicare annual wellness visit, subsequent (V70 0) (Z00 00)   18  Need for influenza vaccination (V04 81) (Z23)   19  Nontoxic single thyroid nodule (241 0) (E04 1)   20  Osteoporosis (733 00) (M81 0)   21  Pruritus (698 9) (L29 9)   22  Screening for genitourinary condition (V81 6) (Z13 89)   23  Screening for neurological condition (V80 09) (Z13 89)   24   Sinusitis (473 9) (J32 9)   25  White coat syndrome with high blood pressure without hypertension (796 2) (R03 0)    Past Medical History    · History of  0 (V49 89)   · History of Hay fever (477 9) (J30 1)   · History of acute sinusitis (V12 69) (Z87 09)   · History of thyroid disease (V12 29) (Z86 39)    Surgical History    · History Of Prior Surgery   · History of Tonsillectomy    The surgical history was reviewed and updated today  Family History    · Family history of Cancer   · Family history of Colon Cancer (V16 0)    · Family history of Cancer   · Family history of Carcinoma Of The Pancreas   · Family history of Thyroid Cancer    · Family history of Coronary Artery Disease (V17 49)    The family history was reviewed and updated today  Social History    · Alcohol Use (History)   · Being A Social Drinker   · Daily Coffee Consumption (1  Cups/Day)   · Daily Cola Consumption (___ Cans/Day)   · Daily Tea Consumption (1  Cups/Day)   · Former smoker (V15 82) (Z87 891)   · Denied: History of H/O domestic violence   · Marital History - Currently    · No drug use   · Uses Safety Equipment - Seatbelts  The social history was reviewed and updated today  The social history was reviewed and is unchanged  Current Meds   1  Atorvastatin Calcium 10 MG Oral Tablet; Take 1 tablet daily; Therapy: 19AMF9563 to (Last Rx:49Nbj9303)  Requested for: 18Soi4280 Ordered   2  BL Vitamin C 500 MG TABS; Take 1 tablet daily; Therapy: (Recorded:14Ant1292) to Recorded   3  Citracal Calcium+D TB24; take 2 tablet daily; Therapy: (Recorded:16Opf6443) to Recorded   4  Ibandronate Sodium 150 MG Oral Tablet; TAKE 1 TABLET ONCE A MONTH WITH 8 TO 10   OUNCES OF WATER STAY UPRIGHT AND DO NOT EAT OR DRINK FOR 1 HOUR;   Therapy: 40YZD0952 to (Last XF:38TZY6093)  Requested for: 31XBX6428 Ordered   5  Levothyroxine Sodium 100 MCG Oral Tablet; TAKE 1 TABLET DAILY;    Therapy: 70XCL9011 to (0488 90 45 88)  Requested for: 85RJE8314; Last   QI:55IGW2379 Ordered   6  Levothyroxine Sodium 88 MCG Oral Tablet; Take 1 tablet daily; Therapy: 76MCL0172 to (Last Flor Rosario)  Requested for: 20Jun2017 Ordered   7  Mometasone Furoate 50 MCG/ACT Nasal Suspension; USE 2 SPRAYS IN EACH   NOSTRIL EVERY MORNING; Therapy: 47ISJ2527 to (Last Rx:07Jun2017)  Requested for: 07Jun2017 Ordered   8  Montelukast Sodium 10 MG Oral Tablet; Take 1 tablet daily; Therapy: 88IBM4520 to (Last Rx:94Tsr2201)  Requested for: 50QUZ1228 Ordered   9  Multivitamins Oral Capsule; Therapy: (Recorded:08Aus6036) to Recorded   10  Omeprazole 40 MG Oral Capsule Delayed Release; take 1 capsule daily; Therapy: 20KCE3040 to (Last Rx:50Stk0570)  Requested for: 36Ofy9760 Ordered   11  Zyrtec TABS; Therapy: (Recorded:34Mqm0157) to Recorded    The medication list was reviewed and updated today  Allergies    1  Amoxicillin TABS   2  Avelox TABS    Vitals   Recorded: 36Jsl8298 03:00PM   BP Comments unobtainable   Height 5 ft 3 in   Weight 167 lb 4 oz   BMI Calculated 29 63   BSA Calculated 1 79     Physical Exam    Constitutional   General appearance: No acute distress, well appearing and well nourished  Eyes   Conjunctiva and lids: No swelling, erythema or discharge  Ears, Nose, Mouth, and Throat   External inspection of ears and nose: Normal     Skin   Skin and subcutaneous tissue: Normal without rashes or lesions  Left nasal tip scar well healed  No pigment changes noted  Neurologic   Cranial nerves: Cranial nerves 2-12 intact  Psychiatric   Orientation to person, place, and time: Normal     Mood and affect: Normal        Assessment    1  Aftercare involving the use of plastic surgery (V51 8) (Z09)   2  Basal cell carcinoma of nasal tip (173 31) (C44 311)    Discussion/Summary    Irvin Lala is a pleasant 42-year-old female who is 1 year status post left nasal tip MOHs with Dr Sapna Maradiaga for [facility] with bilobed flap reconstruction  Please see HPI   She is conscious of her scar however she is overall pleased with her results  She continues to follow-up with [facility] every 6 months  She will follow up with us on an as-needed basis  Thank you very much for allowing me to participate in the care of this patient  If you have any questions, please do not hesitate to contact me        Signatures   Electronically signed by : Smiley Rudolph, [facility]; Sep 18 2017  3:21PM EST                       (Author)    Electronically signed by : YOKO Kaur ; Sep 22 2017  3:27PM EST

## 2018-01-16 ENCOUNTER — ALLSCRIPTS OFFICE VISIT (OUTPATIENT)
Dept: OTHER | Facility: OTHER | Age: 73
End: 2018-01-16

## 2018-01-16 DIAGNOSIS — E03.9 HYPOTHYROIDISM: ICD-10-CM

## 2018-01-16 DIAGNOSIS — E78.5 HYPERLIPIDEMIA: ICD-10-CM

## 2018-01-16 DIAGNOSIS — K21.9 GASTRO-ESOPHAGEAL REFLUX DISEASE WITHOUT ESOPHAGITIS: ICD-10-CM

## 2018-01-16 DIAGNOSIS — E04.1 NONTOXIC SINGLE THYROID NODULE: ICD-10-CM

## 2018-01-16 NOTE — PROGRESS NOTES
Chief Complaint  pt is here for flu shot      Plan  Flu vaccine need    · Fluzone High-Dose 0 5 ML Intramuscular Suspension Prefilled Syringe    Signatures   Electronically signed by : Josephine Roberts, ; Aug 30 2017  2:58PM EST                       (Author)    Electronically signed by : Cherylene Spear, M D ; Aug 31 2017  7:01AM EST                       (Author)

## 2018-01-16 NOTE — PROGRESS NOTES
Chief Complaint  Pt is here for bp ck due to elevated bp during her last ov  Pt has had her bp machine checked against our bp reading during her ov in December 2016 and was in the same range as our reading  Per Dr Finley I entered White coat syndrome into the pt's Problem list and no bp med is going to be rx'd at this time  Active Problems    1  Acute sinusitis (461 9) (J01 90)   2  Basal cell carcinoma of nasal tip (173 31) (C44 311)   3  Elevated blood pressure reading (796 2) (R03 0)   4  Encounter for gynecological examination (general) (routine) without abnormal findings   (V72 31) (Z01 419)   5  Encounter for routine gynecological examination with Papanicolaou smear of cervix   (V72 31,V76 2) (Z01 419)   6  Encounter for screening mammogram for malignant neoplasm of breast (V76 12)   (Z12 31)   7  Esophageal reflux (530 81) (K21 9)   8  Head congestion (478 19) (R09 81)   9  History of allergy (V15 09) (Z88 9)   10  Hyperlipidemia (272 4) (E78 5)   11  Hypothyroidism (244 9) (E03 9)   12  Immunization due (V05 9) (Z23)   13  Malignant neoplasm without specification of site (199 1) (C80 1)   14  Medicare annual wellness visit, initial (V70 0) (Z00 00)   15  Medicare annual wellness visit, subsequent (V70 0) (Z00 00)   16  Need for influenza vaccination (V04 81) (Z23)   17  Nontoxic single thyroid nodule (241 0) (E04 1)   18  Osteoporosis (733 00) (M81 0)   19  Pruritus (698 9) (L29 9)   20  Screening for genitourinary condition (V81 6) (Z13 89)   21  Screening for neurological condition (V80 09) (Z13 89)   22  Sinusitis (473 9) (J32 9)    Current Meds   1  Atorvastatin Calcium 10 MG Oral Tablet; Take 1 tablet daily; Therapy: 70FRU3795 to (Last Rx:23Auv1494)  Requested for: 04UTE7273 Ordered   2  BL Vitamin C 500 MG TABS; Take 1 tablet daily; Therapy: (Recorded:88Nob4655) to Recorded   3  Calcium 600+D 600-200 MG-UNIT Oral Tablet; Take 1 tablet daily; Therapy: (Recorded:54Qcc8235) to Recorded   4  Clarithromycin  MG Oral Tablet Extended Release 24 Hour; 2 tab po qd pc x 10   days; Therapy: 41TBI1249 to (Last Rx:77Bsm1068)  Requested for: 89Wda7168 Ordered   5  Ibandronate Sodium 150 MG Oral Tablet; TAKE 1 TABLET ONCE A MONTH WITH 8 TO   10 OUNCES OF WATER STAY UPRIGHT AND DO NOT EAT OR DRINK FOR 1 HOUR;   Therapy: 11VIL3533 to (Last BW:86VKO6541)  Requested for: 69IQD0835 Ordered   6  Levothyroxine Sodium 88 MCG Oral Tablet; Take 1 tablet daily  Requested for:   85ORO5964; Last Rx:46Kue2823 Ordered   7  Mometasone Furoate 50 MCG/ACT Nasal Suspension; USE 2 SPRAYS IN EACH   NOSTRIL EVERY MORNING; Therapy: 08YBN3602 to (Last Rx:99Ruh6153)  Requested for: 11EQO6137 Ordered   8  Montelukast Sodium 10 MG Oral Tablet; TAKE 1 TABLET BY MOUTH EVERY DAY; Therapy: 01QAQ9904 to (Evaluate:15Jun2017)  Requested for: 20Jun2016; Last   Rx:11Pxj9871 Ordered   9  Multivitamins Oral Capsule; Therapy: (Recorded:41Bzc7233) to Recorded   10  Omeprazole 40 MG Oral Capsule Delayed Release; take 1 capsule daily; Therapy: 34HMN0162 to (Last Rx:04Sym7138)  Requested for: 69UUG3908 Ordered   11  Zyrtec TABS; Therapy: (Recorded:48Zwh9559) to Recorded    Allergies    1  Avelox TABS    Vitals  Signs    Systolic: 080  Diastolic: 92   Systolic: 633  Diastolic: 361    Future Appointments    Date/Time Provider Specialty Site   04/17/2017 01:00 PM Rosalino De Dios [facility] Plastic Surgery BODY EVOLUTION PLASTIC RECON [facility]   06/21/2017 08:30 AM YOKO Levin  [address]     Signatures   Electronically signed by : Natalie Mcknight), ;  Apr 12 2017  9:59AM EST                       (Author)    Electronically signed by : YOKO Zarco Chi ; Apr 12 2017 12:25PM EST                       (Author)

## 2018-01-17 NOTE — PROGRESS NOTES
Assessment   1  Esophageal reflux (530 81) (K21 9)   2  Hyperlipidemia (272 4) (E78 5)   3  Hypothyroidism (244 9) (E03 9)   4  Basal cell carcinoma of skin of nose (173 31) (C44 311)   5  Nontoxic single thyroid nodule (241 0) (E04 1)    Plan   Esophageal reflux, Hyperlipidemia, Hypothyroidism, Nontoxic single thyroid nodule    · (1) CBC/PLT/DIFF; Status:Active; Requested CGO:[id number];    · (1) COMPREHENSIVE METABOLIC PANEL; Status:Active; Requested UWM:[id number];    · (1) LIPID PANEL, FASTING; Status:Active; Requested ODQ:[id number];    · (1) TSH; Status:Active; Requested PRL:[id number];   Hypothyroidism, Nontoxic single thyroid nodule    · US THYROID; Status:Hold For - Scheduling; Requested [facility]:[id number]; Discussion/Summary      #1 hypothyroid/thyroid nodule - recheck labs  Refer for repeat thyroid U/S  Recheck 6m hyperlipidemia - check labs GERD - stable  Cont present meds  BCC - s/p Moh's procedure on nose  Cont f/u with derm HM - check labs as above  AWV done  Recheck 6m  Pt to call for problems or concerns in the interim  The patient was counseled regarding diagnostic results,-- instructions for management,-- risk factor reductions,-- prognosis,-- patient and family education,-- impressions,-- risks and benefits of treatment options,-- importance of compliance with treatment  Possible side effects of new medications were reviewed with the patient/guardian today  The treatment plan was reviewed with the patient/guardian  The patient/guardian understands and agrees with the treatment plan      Chief Complaint   6 MONTH WELL CHECK VISIT - HTN , HYPOTHYROIDISM    Patient is here today for follow up of chronic conditions described in HPI  Advance Directives   Advance Directive [facility]:    The patient is in agreement to receive the Advance Care Planning service--       YES - Patient has an advance health care directive  The patient has a living will located   Capacity/Competence:  This patient has full decision making capacity for discussion of advance care planning-- and-- This patient has full decision making competency for discussion of advance care planning  The provider spent 1 minutes discussing Advance Directives  History of Present Illness   as above pt is feeling much better  Hearing and ear discomfort if better with pred  No new symptoms pt feels well otherwise  No CP, palp, lightheadedness or other CV symptoms GI or  complaints no extremity complaints      Review of Systems        Constitutional: as noted in HPI  Eyes: No complaints of eye pain, no red eyes, no eyesight problems, no discharge, no dry eyes, no itching of eyes  ENT: no complaints of earache, no loss of hearing, no nose bleeds, no nasal discharge, no sore throat, no hoarseness  Cardiovascular: No complaints of slow heart rate, no fast heart rate, no chest pain, no palpitations, no leg claudication, no lower extremity edema  Respiratory: No complaints of shortness of breath, no wheezing, no cough, no SOB on exertion, no orthopnea, no PND  Gastrointestinal: No complaints of abdominal pain, no constipation, no nausea or vomiting, no diarrhea, no bloody stools  Genitourinary: No complaints of dysuria, no incontinence, no pelvic pain, no dysmenorrhea, no vaginal discharge or bleeding  Musculoskeletal: No complaints of arthralgias, no myalgias, no joint swelling or stiffness, no limb pain or swelling  Integumentary: No complaints of skin rash or lesions, no itching, no skin wounds, no breast pain or lump  Neurological: No complaints of headache, no confusion, no convulsions, no numbness, no dizziness or fainting, no tingling, no limb weakness, no difficulty walking  Endocrine: No complaints of proptosis, no hot flashes, no muscle weakness, no deepening of the voice, no feelings of weakness        Hematologic/Lymphatic: No complaints of swollen glands, no swollen glands in the neck, does not bleed easily, does not bruise easily  Over the past 2 weeks, how often have you been bothered by the following problems? 1 ) Little interest or pleasure in doing things? Not at all       2 ) Feeling down, depressed or hopeless? Not at all       3 ) Trouble falling asleep or sleeping too much? Not at all       4 ) Feeling tired or having little energy? Several days  5 ) Poor appetite or overeating? Not at all       6 ) Feeling bad about yourself, or that you are a failure, or have let yourself or your family down? Not at all       7 ) Trouble concentrating on things, such as reading a newspaper or watching television? Not at all       8 ) Moving or speaking so slowly that other people could have noticed, or the opposite, moving or speaking faster than usual? Not at all       9 ) Thoughts that you would be better off dead or of hurting yourself in some way? Not at all  Score 1      Active Problems   1  Acute sinusitis (461 9) (J01 90)   2  Aftercare involving the use of plastic surgery (V51 8) (Z09)   3  Allergy to environmental factors (V15 09) (Z91 09)   4  Basal cell carcinoma of skin of nose (173 31) (C44 311)   5  Elevated blood pressure reading (796 2) (R03 0)   6  Encounter for gynecological examination (general) (routine) without abnormal findings     (V72 31) (Z01 419)   7  Encounter for routine gynecological examination with Papanicolaou smear of cervix     (V72 31,V76 2) (Z01 419)   8  Encounter for screening mammogram for malignant neoplasm of breast (V76 12)     (Z12 31)   9  Esophageal reflux (530 81) (K21 9)   10  Flu vaccine need (V04 81) (Z23)   11  Head congestion (478 19) (R09 81)   12  History of allergy (V15 09) (Z88 9)   13  History of Moh's micrographic surgery for skin cancer (V10 83) (Z85 828,Z98 890)   14  Hyperlipidemia (272 4) (E78 5)   15  Hypothyroidism (244 9) (E03 9)   16  Immunization due (V05 9) (Z23)   17   Malignant neoplasm without specification of site (199 1) (C80 1)   18  Medicare annual wellness visit, initial (V70 0) (Z00 00)   19  Medicare annual wellness visit, subsequent (V70 0) (Z00 00)   20  Need for influenza vaccination (V04 81) (Z23)   21  Nontoxic single thyroid nodule (241 0) (E04 1)   22  Osteoporosis (733 00) (M81 0)   23  Pruritus (698 9) (L29 9)   24  Screening for genitourinary condition (V81 6) (Z13 89)   25  Screening for neurological condition (V80 09) (Z13 89)   26  Sinusitis (473 9) (J32 9)   27  White coat syndrome with high blood pressure without hypertension (796 2) (R03 0)    Past Medical History   1  History of  0 (V49 89)   2  History of Hay fever (477 9) (J30 1)   3  History of acute sinusitis (V12 69) (Z87 09)   4  History of thyroid disease (V12 29) (Z86 39)     The active problems and past medical history were reviewed and updated today  Surgical History   1  History Of Prior Surgery   2  History of Tonsillectomy     The surgical history was reviewed and updated today  Family History   Mother    1  Family history of Cancer   2  Family history of Colon Cancer (V16 0)  Father    3  Family history of Cancer   4  Family history of Carcinoma Of The Pancreas   5  Family history of Thyroid Cancer  Paternal Grandmother    10  Family history of Coronary Artery Disease (V17 49)     The family history was reviewed and updated today  Social History    · Alcohol Use (History)   · Being A Social Drinker   · Daily Coffee Consumption (1  Cups/Day)   · Daily Cola Consumption (___ Cans/Day)   · Daily Tea Consumption (1  Cups/Day)   · Former smoker (V15 82) (Z87 891)   · Denied: History of H/O domestic violence   · Marital History - Currently    · No drug use   · Uses Safety Equipment - Seatbelts  The social history was reviewed and updated today  Current Meds    1  Atorvastatin Calcium 10 MG Oral Tablet; Take 1 tablet daily; Therapy: 05DNN2817 to (Last Rx:85Xlg0217)  Requested for: 54Eoy5291 Ordered   2   BL Vitamin C 500 MG TABS; Take 1 tablet daily; Therapy: (Recorded:76Fwn2740) to Recorded   3  Citracal Calcium+D TB24; take 2 tablet daily; Therapy: (Recorded:60Iar8544) to Recorded   4  Ibandronate Sodium 150 MG Oral Tablet; TAKE 1 TABLET ONCE A MONTH WITH 8 TO 10     OUNCES OF WATER STAY UPRIGHT AND DO NOT EAT OR DRINK FOR 1 HOUR;     Therapy: 62HHT2577 to (Last IP:84LJX9729)  Requested for: 87HNS1958 Ordered   5  Levothyroxine Sodium 100 MCG Oral Tablet; Take 1 tablet daily; Therapy: 38YRS2073 to (Last MW:61MNF1278)  Requested for: 60MOC8856 Ordered   6  Mometasone Furoate 50 MCG/ACT Nasal Suspension; USE 2 SPRAYS IN EACH     NOSTRIL EVERY MORNING; Therapy: 48JSB0896 to (Last [id number])  Requested for: [phone] Ordered   7  Montelukast Sodium 10 MG Oral Tablet; Take 1 tablet daily; Therapy: 43CXW0952 to (Last Rx:78Aix5268)  Requested for: 86XTW4997 Ordered   8  Multivitamins Oral Capsule; Therapy: (Recorded:27Bzw3508) to Recorded   9  Omeprazole 40 MG Oral Capsule Delayed Release; take 1 capsule daily; Therapy: 36IQS9450 to (Last Rx:32Cxm6298)  Requested for: 13Pho0639 Ordered   10  Zyrtec TABS; Therapy: (Recorded:03Cel9439) to Recorded     The medication list was reviewed and updated today  Allergies   1  Amoxicillin TABS   2  Avelox TABS    Vitals   Vital Signs    Recorded: 78FOJ8671 10:03AM   Temperature 97 6 F   Heart Rate 68   Respiration 16   Systolic 844   Diastolic 76   Height 5 ft 3 in   Weight 167 lb 4 oz   BMI Calculated 29 63   BSA Calculated 1 79     Physical Exam        Constitutional      General appearance: No acute distress, well appearing and well nourished  Head and Face      Head and face: Normal        Palpation of the face and sinuses: No sinus tenderness  Eyes      Conjunctiva and lids: No swelling, erythema or discharge  Pupils and irises: Equal, round, reactive to light         Ophthalmoscopic examination: Normal fundi and optic discs  Ears, Nose, Mouth, and Throat      External inspection of ears and nose: Normal        Otoscopic examination: Tympanic membranes translucent with normal light reflex  Canals patent without erythema  Nasal mucosa, septum, and turbinates: Abnormal  -- Slight nasal congestion  Lips, teeth, and gums: Normal, good dentition  Oropharynx: Normal with no erythema, edema, exudate or lesions  Neck      Neck: Supple, symmetric, trachea midline, no masses  Thyroid: Abnormal  -- nodular thyroid  Pulmonary      Respiratory effort: No increased work of breathing or signs of respiratory distress  Auscultation of lungs: Clear to auscultation  Cardiovascular      Auscultation of heart: Normal rate and rhythm, normal S1 and S2, no murmurs  Carotid pulses: 2+ bilaterally  Abdominal aorta: Normal        Pedal pulses: 2+ bilaterally  Peripheral vascular exam: Normal        Examination of extremities for edema and/or varicosities: Normal        Abdomen      Abdomen: Non-tender, no masses  Liver and spleen: No hepatomegaly or splenomegaly  Lymphatic      Palpation of lymph nodes in neck: No lymphadenopathy  Palpation of lymph nodes in other areas: No lymphadenopathy  Musculoskeletal      Gait and station: Normal        Digits and nails: Normal without clubbing or cyanosis  Joints, bones, and muscles: Normal  -- R upper back lipoma - unchanged  Muscle strength/tone: Normal        Neurologic      Cranial nerves: Cranial nerves II-XII intact  Cortical function: Normal mental status  -- MMSE 30/30  Reflexes: 2+ and symmetric  Sensation: No sensory loss  Coordination: Normal finger to nose and heel to shin  Psychiatric      Judgment and insight: Normal        Orientation to person, place, and time: Normal        Recent and remote memory: Intact  -- 5 min recall 3/3        Mood and affect: Normal  -- PHQ-9 = 1       Signatures    Electronically signed by : YOKO Hammond ; Jan 16 2018 12:49PM EST                       (Author)

## 2018-01-22 VITALS — HEIGHT: 63 IN | BODY MASS INDEX: 29.63 KG/M2 | WEIGHT: 167.25 LBS

## 2018-01-22 VITALS — DIASTOLIC BLOOD PRESSURE: 92 MMHG | SYSTOLIC BLOOD PRESSURE: 150 MMHG

## 2018-01-23 VITALS
HEART RATE: 68 BPM | BODY MASS INDEX: 29.63 KG/M2 | WEIGHT: 167.25 LBS | HEIGHT: 63 IN | TEMPERATURE: 97.6 F | RESPIRATION RATE: 16 BRPM | SYSTOLIC BLOOD PRESSURE: 124 MMHG | DIASTOLIC BLOOD PRESSURE: 76 MMHG

## 2018-01-23 VITALS
BODY MASS INDEX: 29.63 KG/M2 | WEIGHT: 167.25 LBS | SYSTOLIC BLOOD PRESSURE: 122 MMHG | HEART RATE: 64 BPM | DIASTOLIC BLOOD PRESSURE: 70 MMHG | HEIGHT: 63 IN | TEMPERATURE: 98.5 F

## 2018-01-23 NOTE — PROGRESS NOTES
Assessment   1  Esophageal reflux (530 81) (K21 9)  2  Hyperlipidemia (272 4) (E78 5)  3  Hypothyroidism (244 9) (E03 9)  4  Basal cell carcinoma of skin of nose (173 31) (C44 311)  5  Nontoxic single thyroid nodule (241 0) (E04 1)  6  Medicare annual wellness visit, subsequent (V70 0) (Z00 00)1      1 Amended By: Hector Dawson; Jan 16 2018 12:52 PM EST    Plan  Esophageal reflux, Hyperlipidemia, Hypothyroidism, Nontoxic single thyroid nodule    · (1) CBC/PLT/DIFF; Status:Active; Requested XGF:[id number];    · (1) COMPREHENSIVE METABOLIC PANEL; Status:Active; Requested ONK:[id number];    · (1) LIPID PANEL, FASTING; Status:Active; Requested SHORTY:37NCL0524;    · (1) TSH; Status:Active; Requested NPF:[id number];   Hypothyroidism, Nontoxic single thyroid nodule    · US THYROID; Status:Hold For - Scheduling; Requested HXE:[id number]; Discussion/Summary    #1 subsequent AWV - done  See forms  #2 HM - I reviewed Medicare questions and  issues with pt  See f/u note for chronic med issues  Recheck 6m - repeat AWV in 1 tommie  Pt to call for problems or concerns in the interim  Impression: Subsequent Annual Wellness Visit  Cardiovascular screening and counseling: the risks and benefits of screening were discussed, counseling was given on maintaining a healthy diet, counseling was given on maintaining a healthy weight, counseling was given on ways to improve cholesterol, counseling was given on ways to improve blood pressure, counseling was given on ways to improve exercise tolerance and due for a lipid panel  Diabetes screening and counseling: the risks and benefits of screening were discussed, counseling was given on maintaining a healthy diet, counseling was given on maintaining a healthy weight, counseling was given on ways to improve physical activity and due for blood glucose  Colorectal cancer screening and counseling: the risks and benefits of screening were discussed and screening is current  Breast cancer screening and counseling: the risks and benefits of screening were discussed and screening is current  Cervical cancer screening and counseling: the risks and benefits of screening were discussed and screening is current  Osteoporosis screening and counseling: the risks and benefits of screening were discussed and screening is current  Glaucoma screening and counseling: the risks and benefits of screening were discussed and screening is current  Immunizations: the risks and benefits of influenza vaccination were discussed with the patient, influenza vaccine is up to date this year, the risks and benefits of pneumococcal vaccination were discussed with the patient and Prevnar 13 given  Advance Directive Planning: complete and up to date  Advice and education were given regarding fall risk reduction, increasing physical activity, nutrition (non-diabetic), skin self-exam and sunscreen use  Patient Discussion: plan discussed with the patient, follow-up visit needed in one year  Possible side effects of new medications were reviewed with the patient/guardian today  The treatment plan was reviewed with the patient/guardian  The patient/guardian understands and agrees with the treatment plan   The patient was counseled regarding diagnostic results, instructions for management, risk factor reductions, prognosis, patient and family education, impressions, risks and benefits of treatment options, importance of compliance with treatment  Advance Directives  Advance Directive [facility]:   The patient is in agreement to receive the Advance Care Planning service    YES - Patient has an advance health care directive  The patient has a living will located   Capacity/Competence: This patient has full decision making capacity for discussion of advance care planning and This patient has full decision making competency for discussion of advance care planning   The provider spent 1 minutes discussing Advance Directives  History of Present Illness  HPI: as above  - pt is feeling much better  Hearing and ear discomfort if better with pred  No new symptoms  - pt feels well otherwise  No CP, palp, lightheadedness or other CV symptoms  - GI or  complaints  - no extremity complaints   Welcome to Medicare and Wellness Visits: The patient is being seen for the subsequent annual wellness visit  Medicare Screening and Risk Factors   Hospitalizations: no previous hospitalizations  Medicare Screening Tests Risk Questions   Drug and Alcohol Use: The patient is a former cigarette smoker and quit smoking  The patient reports frequent alcohol use and drinking 1 drinks per day  She has never used illicit drugs  Diet and Physical Activity: Current diet includes well balanced meals, limited junk food, frequent junk food, 2 servings of fruit per day, 2 servings of vegetables per day, 1 servings of meat per day, 1 servings of whole grains per day, 2 servings of simple carbohydrates per day, 1 servings of dairy products per day, 1 cups of coffee per day, 0 cups of tea per day, 0 cans of regular soda per day, 1 cans of diet soda per day and 3 glasses water qd  She exercises infrequently  Exercise: walking, stretching 20 minutes per day  Mood Disorder and Cognitive Impairment Screening: PHQ-9 Depression Scale   Over the past 2 weeks, how often have you been bothered by the following problems? 1 ) Little interest or pleasure in doing things? Not at all    2 ) Feeling down, depressed or hopeless? Not at all    3 ) Trouble falling asleep or sleeping too much? Not at all    4 ) Feeling tired or having little energy? Several days  5 ) Poor appetite or overeating? Not at all    6 ) Feeling bad about yourself, or that you are a failure, or have let yourself or your family down? Not at all    7 ) Trouble concentrating on things, such as reading a newspaper or watching television?  Not at all    8 ) Moving or speaking so slowly that other people could have noticed, or the opposite, moving or speaking faster than usual? Not at all  How difficult have these problems made it for you to do your work, take care of things at home, or get along with people? Not at all  Depression screening  negative for symptoms  She denies feeling down, depressed, or hopeless over the past two weeks  She denies feeling little interest or pleasure in doing things over the past two weeks  Cognitive impairment screening: denies difficulty learning/retaining new information, denies difficulty handling complex tasks, denies difficulty with reasoning, denies difficulty with spatial ability and orientation, denies difficulty with language and denies difficulty with behavior  Functional Ability/Level of Safety: Hearing is slightly decreased, slightly decreased in the right ear and slightly decreased in the left ear  She reports hearing difficulties  She does not use a hearing aid  Activities of daily living details: does not need help using the phone, no transportation help needed, does not need help shopping, no meal preparation help needed, does not need help doing housework, does not need help doing laundry, does not need help managing medications and does not need help managing money  Fall risk factors: The patient fell 0 times in the past 12 months  Home safety risk factors:  no unfamiliar surroundings, no loose rugs, no poor household lighting, no uneven floors, no household clutter, grab bars in the bathroom and handrails on the stairs  Advance Directives: Advance directives: living will, durable power of  for health care directives and advance directives  Co-Managers and Medical Equipment/Suppliers: See Patient Care Team   Reviewed Updated [address] [place] [address] [address]:   Last Medicare Wellness Visit Information was reviewed, patient interviewed and updates made to the record today     Preventive Quality Program 65 and Older: Falls Risk: The patient fell 0 times in the past 12 months  The patient currently has no urinary incontinence symptoms  Patient Care Team    Care Team Member Role Specialty Office Number   [phone]  Ophthalmology [phone]   Ruthann Phoenix M D  Specialist Obstetrics/Gynecology [phone]     Review of Systems    Over the past 2 weeks, how often have you been bothered by the following problems? 1 ) Little interest or pleasure in doing things? Not at all    2 ) Feeling down, depressed or hopeless? Not at all    3 ) Trouble falling asleep or sleeping too much? Not at all    4 ) Feeling tired or having little energy? Several days  5 ) Poor appetite or overeating? Not at all    6 ) Feeling bad about yourself, or that you are a failure, or have let yourself or your family down? Not at all    7 ) Trouble concentrating on things, such as reading a newspaper or watching television? Not at all    8 ) Moving or speaking so slowly that other people could have noticed, or the opposite, moving or speaking faster than usual? Not at all    9 ) Thoughts that you would be better off dead or of hurting yourself in some way? Not at all  Score 1     Constitutional: as noted in HPI  Eyes: No complaints of eye pain, no red eyes, no eyesight problems, no discharge, no dry eyes, no itching of eyes  ENT: no complaints of earache, no loss of hearing, no nose bleeds, no nasal discharge, no sore throat, no hoarseness  Cardiovascular: No complaints of slow heart rate, no fast heart rate, no chest pain, no palpitations, no leg claudication, no lower extremity edema  Respiratory: No complaints of shortness of breath, no wheezing, no cough, no SOB on exertion, no orthopnea, no PND  Gastrointestinal: No complaints of abdominal pain, no constipation, no nausea or vomiting, no diarrhea, no bloody stools  Genitourinary: No complaints of dysuria, no incontinence, no pelvic pain, no dysmenorrhea, no vaginal discharge or bleeding  Musculoskeletal: No complaints of arthralgias, no myalgias, no joint swelling or stiffness, no limb pain or swelling  Integumentary: No complaints of skin rash or lesions, no itching, no skin wounds, no breast pain or lump  Neurological: No complaints of headache, no confusion, no convulsions, no numbness, no dizziness or fainting, no tingling, no limb weakness, no difficulty walking  Endocrine: No complaints of proptosis, no hot flashes, no muscle weakness, no deepening of the voice, no feelings of weakness  Hematologic/Lymphatic: No complaints of swollen glands, no swollen glands in the neck, does not bleed easily, does not bruise easily  Active Problems   1  Acute sinusitis (461 9) (J01 90)  2  Aftercare involving the use of plastic surgery (V51 8) (Z09)  3  Allergy to environmental factors (V15 09) (Z91 09)  4  Basal cell carcinoma of skin of nose (173 31) (C44 311)  5  Elevated blood pressure reading (796 2) (R03 0)  6  Encounter for gynecological examination (general) (routine) without abnormal findings   (V72 31) (Z01 419)  7  Encounter for routine gynecological examination with Papanicolaou smear of cervix   (V72 31,V76 2) (Z01 419)  8  Encounter for screening mammogram for malignant neoplasm of breast (V76 12)   (Z12 31)  9  Esophageal reflux (530 81) (K21 9)  10  Flu vaccine need (V04 81) (Z23)  11  Head congestion (478 19) (R09 81)  12  History of allergy (V15 09) (Z88 9)  13  History of Moh's micrographic surgery for skin cancer (V10 83) (Z85 828,Z98 890)  14  Hyperlipidemia (272 4) (E78 5)  15  Hypothyroidism (244 9) (E03 9)  16  Immunization due (V05 9) (Z23)  17  Malignant neoplasm without specification of site (199 1) (C80 1)  18  Medicare annual wellness visit, initial (V70 0) (Z00 00)  19  Medicare annual wellness visit, subsequent (V70 0) (Z00 00)  20  Need for influenza vaccination (V04 81) (Z23)  21  Nontoxic single thyroid nodule (241 0) (E04 1)  22   Osteoporosis (733 00) (M81 0)  23  Pruritus (698 9) (L29 9)  24  Screening for genitourinary condition (V81 6) (Z13 89)  25  Screening for neurological condition (V80 09) (Z13 89)  26  Sinusitis (473 9) (J32 9)  27  White coat syndrome with high blood pressure without hypertension (796 2) (R03 0)    Past Medical History    · History of  0 (V49 89)   · History of Hay fever (477 9) (J30 1)   · History of acute sinusitis (V12 69) (Z87 09)   · History of thyroid disease (V12 29) (Z86 39)    The active problems and past medical history were reviewed and updated today  Surgical History    · History Of Prior Surgery   · History of Tonsillectomy    The surgical history was reviewed and updated today  Family History  Mother    · Family history of Cancer   · Family history of Colon Cancer (V16 0)  Father    · Family history of Cancer   · Family history of Carcinoma Of The Pancreas   · Family history of Thyroid Cancer  Paternal Grandmother    · Family history of Coronary Artery Disease (V17 49)    The family history was reviewed and updated today  Social History    · Alcohol Use (History)   · Being A Social Drinker   · Daily Coffee Consumption (1  Cups/Day)   · Daily Cola Consumption (___ Cans/Day)   · Daily Tea Consumption (1  Cups/Day)   · Former smoker (V15 82) (Z87 891)   · Denied: History of H/O domestic violence   · Marital History - Currently    · No drug use   · Uses Safety Equipment - Seatbelts  The social history was reviewed and updated today  Current Meds  1  Atorvastatin Calcium 10 MG Oral Tablet; Take 1 tablet daily; Therapy: 22MRG5601 to (Last Rx:2017)  Requested for: 2017 Ordered  2  BL Vitamin C 500 MG TABS; Take 1 tablet daily; Therapy: (Recorded:28Pcn2984) to Recorded  3  Citracal Calcium+D TB24; take 2 tablet daily; Therapy: (Recorded:2017) to Recorded  4   Ibandronate Sodium 150 MG Oral Tablet; TAKE 1 TABLET ONCE A MONTH WITH 8 TO   10 OUNCES OF WATER STAY UPRIGHT AND DO NOT EAT OR DRINK FOR 1 HOUR;   Therapy: 61IAY9842 to (Last Rx:05Jan2017)  Requested for: 87PTB0045 Ordered  5  Levothyroxine Sodium 100 MCG Oral Tablet; Take 1 tablet daily; Therapy: 10IUP4536 to (Last HZ:12PHN2572)  Requested for: 73ABI6142 Ordered  6  Mometasone Furoate 50 MCG/ACT Nasal Suspension; USE 2 SPRAYS IN EACH   NOSTRIL EVERY MORNING; Therapy: 83GCK1980 to (Last [id number])  Requested for: [phone] Ordered  7  Montelukast Sodium 10 MG Oral Tablet; Take 1 tablet daily; Therapy: 10GXW5463 to (Last Rx:50Wpf5567)  Requested for: 19PUI4295 Ordered  8  Multivitamins Oral Capsule; Therapy: (Recorded:50Ahd1636) to Recorded  9  Omeprazole 40 MG Oral Capsule Delayed Release; take 1 capsule daily; Therapy: 31GZF0763 to (Last Rx:01Zvr5140)  Requested for: 73Wex1503 Ordered  10  Zyrtec TABS; Therapy: (Recorded:85Gss1686) to Recorded    The medication list was reviewed and updated today  Allergies   1  Amoxicillin TABS  2  Avelox TABS    Immunizations   ** Printed in Appendix #1 below  Vitals  Signs    Temperature: 97 6 F  Heart Rate: 68  Respiration: 16  Systolic: 566  Diastolic: 76  Height: 5 ft 3 in  Weight: 167 lb 4 oz  BMI Calculated: 29 63  BSA Calculated: 1 79    Physical Exam    Constitutional   General appearance: No acute distress, well appearing and well nourished  Head and Face   Head and face: Normal     Palpation of the face and sinuses: No sinus tenderness  Eyes   Conjunctiva and lids: No swelling, erythema or discharge  Pupils and irises: Equal, round, reactive to light  Ophthalmoscopic examination: Normal fundi and optic discs  Ears, Nose, Mouth, and Throat   External inspection of ears and nose: Normal     Otoscopic examination: Tympanic membranes translucent with normal light reflex  Canals patent without erythema  Nasal mucosa, septum, and turbinates: Abnormal   Slight nasal congestion  Lips, teeth, and gums: Normal, good dentition      Oropharynx: Normal with no erythema, edema, exudate or lesions  Neck   Neck: Supple, symmetric, trachea midline, no masses  Thyroid: Abnormal   nodular thyroid  Pulmonary   Respiratory effort: No increased work of breathing or signs of respiratory distress  Auscultation of lungs: Clear to auscultation  Cardiovascular   Auscultation of heart: Normal rate and rhythm, normal S1 and S2, no murmurs  Carotid pulses: 2+ bilaterally  Abdominal aorta: Normal     Pedal pulses: 2+ bilaterally  Peripheral vascular exam: Normal     Examination of extremities for edema and/or varicosities: Normal     Abdomen   Abdomen: Non-tender, no masses  Liver and spleen: No hepatomegaly or splenomegaly  Lymphatic   Palpation of lymph nodes in neck: No lymphadenopathy  Palpation of lymph nodes in other areas: No lymphadenopathy  Musculoskeletal   Gait and station: Normal     Digits and nails: Normal without clubbing or cyanosis  Joints, bones, and muscles: Normal   R upper back lipoma - unchanged  Muscle strength/tone: Normal     Neurologic   Cranial nerves: Cranial nerves II-XII intact  Cortical function: Normal mental status  MMSE 30/30  Reflexes: 2+ and symmetric  Sensation: No sensory loss  Coordination: Normal finger to nose and heel to shin  Psychiatric   Judgment and insight: Normal     Orientation to person, place, and time: Normal     Recent and remote memory: Intact  5 min recall 3/3  Mood and affect: Normal   PHQ-9 = 1        Signatures   Electronically signed by : YOKO Bullock ; 2018 12:53PM EST                       (Author)    Appendix #1     Patient: Ava Johnson ; : 1945; MRN: [id number]      1 2 3 4 5 6    Influenza  01-Sep-2011 16-Oct-2012 03-Oct-2013 14-Oct-2014 22-Sep-2015 14-Sep-2016    PCV  01-Dec-2016         PPSV  2013         PPD  2004

## 2018-03-13 ENCOUNTER — HOSPITAL ENCOUNTER (OUTPATIENT)
Dept: ULTRASOUND IMAGING | Facility: HOSPITAL | Age: 73
Discharge: HOME | End: 2018-03-13
Payer: MEDICARE

## 2018-03-13 DIAGNOSIS — E03.9 HYPOTHYROIDISM: ICD-10-CM

## 2018-03-13 DIAGNOSIS — E04.1 NONTOXIC SINGLE THYROID NODULE: ICD-10-CM

## 2018-03-13 PROCEDURE — 76536 US EXAM OF HEAD AND NECK: CPT

## 2018-03-14 DIAGNOSIS — J30.9 CHRONIC ALLERGIC RHINITIS, UNSPECIFIED SEASONALITY, UNSPECIFIED TRIGGER: Primary | ICD-10-CM

## 2018-03-14 DIAGNOSIS — E78.2 MIXED HYPERLIPIDEMIA: ICD-10-CM

## 2018-03-14 RX ORDER — ATORVASTATIN CALCIUM 10 MG/1
10 TABLET, FILM COATED ORAL DAILY
Qty: 90 TABLET | Refills: 3 | Status: SHIPPED | OUTPATIENT
Start: 2018-03-14 | End: 2018-12-12 | Stop reason: SDUPTHER

## 2018-03-14 RX ORDER — MOMETASONE FUROATE 50 UG/1
2 SPRAY, METERED NASAL DAILY
Qty: 17 G | Refills: 3 | Status: SHIPPED | OUTPATIENT
Start: 2018-03-14 | End: 2018-06-14 | Stop reason: SDUPTHER

## 2018-04-09 ENCOUNTER — ANNUAL EXAM (OUTPATIENT)
Dept: OBGYN CLINIC | Facility: CLINIC | Age: 73
End: 2018-04-09
Payer: MEDICARE

## 2018-04-09 VITALS
SYSTOLIC BLOOD PRESSURE: 142 MMHG | BODY MASS INDEX: 28.68 KG/M2 | DIASTOLIC BLOOD PRESSURE: 86 MMHG | WEIGHT: 168 LBS | HEIGHT: 64 IN

## 2018-04-09 DIAGNOSIS — Z12.31 ENCOUNTER FOR SCREENING MAMMOGRAM FOR MALIGNANT NEOPLASM OF BREAST: ICD-10-CM

## 2018-04-09 DIAGNOSIS — Z01.419 ENCOUNTER FOR GYNECOLOGICAL EXAMINATION (GENERAL) (ROUTINE) WITHOUT ABNORMAL FINDINGS: Primary | ICD-10-CM

## 2018-04-09 PROBLEM — R03.0 WHITE COAT SYNDROME WITH HIGH BLOOD PRESSURE WITHOUT HYPERTENSION: Status: ACTIVE | Noted: 2017-04-12

## 2018-04-09 PROCEDURE — G0101 CA SCREEN;PELVIC/BREAST EXAM: HCPCS | Performed by: PHYSICIAN ASSISTANT

## 2018-04-09 RX ORDER — LEVOTHYROXINE SODIUM 0.1 MG/1
TABLET ORAL
COMMUNITY
Start: 2018-03-07 | End: 2018-04-18 | Stop reason: SDUPTHER

## 2018-04-09 NOTE — PROGRESS NOTES
Assessment/Plan   Diagnoses and all orders for this visit:    Encounter for gynecological examination (general) (routine) without abnormal findings  -     GP PAP (RFLX HPV PLUS WHEN ASCUS)  The current ASCCP guidelines were reviewed  The low risk patient will receive pap smear screening every 3 years or pap with HPV co-testing every 5 years  Per the current guidelines, pap smears may be discontinued after age 72  High risk patients will be triaged based on previous pap smear results, which have been reviewed; I emphasized the importance of an annual pelvic and breast exam  Patient opts to have a pap done today  Encounter for screening mammogram for malignant neoplasm of breast  -     Mammo screening bilateral w 3d & cad; Future  A yearly mammogram is recommended for breast cancer screening starting at age 36;     Discussion  I have discussed the importance of monthly self-breast exams, exercise and healthy diet as well as adequate intake of calcium and vitamin D  Encourage at least 1200 mg calcium citrate + 2000 IUs vitamin D3 divided through diet and supplement throughout the day; Encourage 30-40 min weight bearing exercise most days of week  STD testing - declines  In addition, colon cancer screening with a colonoscopy is recommended starting at age 48 and reviewed benefits - patient plans to have a follow-up this year  I have reviewed the patient's risk factors for osteoporosis and ordered a dexa scan if applicable  Pt to check with insurance for coverage - patient is up to date with her Dexa scan screening and following with PCP for osteopenia treatment/screening  All questions have been answered to her satisfaction  RTO for APE or sooner if needed      Subjective     HPI   Meron Bishop is a 81 y o  female who presents for annual well woman exam    LMP - 2001; Denies  bleeding  No vulvar itch/burn; No vaginal itch/burn; No abn discharge or odor;  No urinary sx - burning/pain/frequency/hematuria  (+) SBEs - no breast masses, asymmetry, nipple discharge or bleeding, changes in skin of breast, or breast tenderness bilaterally  No abd/pelvic pain or HAs; No menopausal symptoms: No hot flashes/night sweats - except occasional with change of season, problems with intercourse, vaginal dryness; sleeping well  Pt is not currently sexually active; She declines std/hiv/hep testing; Feels safe at home  (+) PCP for routine Bw/care; Last Pap - 3/29/16 - WNL (-) E[facility]  History of abnormal Pap smear: no  Last mammo - 5/3/17 - Birads 1 negative  History of abnormal mammogram: no  Last colonoscopy - 2013 - WNL per patient  Last Dexa scan - 5/4/17 - osteopenia of spine - PCP blayne/david Stewart since on for 5 years    Review of Systems   Constitutional: Negative for activity change, fatigue, fever and unexpected weight change  HENT: Negative for congestion, dental problem, sinus pain and sinus pressure  Eyes: Negative for visual disturbance  Respiratory: Negative for cough, shortness of breath and wheezing  Cardiovascular: Negative for chest pain and leg swelling  Gastrointestinal: Negative for abdominal distention, abdominal pain, blood in stool, constipation, diarrhea, nausea and vomiting  Endocrine: Negative for polydipsia  Genitourinary: Negative for difficulty urinating, dyspareunia, dysuria, frequency, hematuria, menstrual problem, pelvic pain, urgency, vaginal bleeding, vaginal discharge and vaginal pain  Musculoskeletal: Negative for arthralgias and back pain  Allergic/Immunologic: Negative for environmental allergies  Neurological: Negative for dizziness, seizures and headaches  Psychiatric/Behavioral: Negative for dysphoric mood and sleep disturbance  The patient is not nervous/anxious          The following portions of the patient's history were reviewed and updated as appropriate: allergies, current medications, past family history, past medical history, past social history, past surgical history and problem list          OB History      Para Term  AB Living    0 0 0 0 0 0    SAB TAB Ectopic Multiple Live Births    0 0 0 0 0          Past Medical History:   Diagnosis Date    Acid reflux     Basal cell carcinoma     basal cell on back and nose    Dental bridge present     upper    Disease of thyroid gland     Hashimoto's disease     High cholesterol     Hypertension     not on meds    Osteopenia     Seasonal allergies     mold,pollen    Thyroid disease     thyroid nodules    Wears glasses        Past Surgical History:   Procedure Laterality Date    COLONOSCOPY      ESOPHAGOGASTRODUODENOSCOPY      MOHS RECONSTRUCTION N/A 10/4/2016    Procedure: RECONSTRUCTION MOHS DEFECT NASAL TIP WITH FLAP;  Surgeon: Lee Melgar MD;  Location: [facility];  Service:     SKIN BIOPSY      SKIN CANCER EXCISION      basal cell CA on back and nose- and     TONSILLECTOMY         Family History   Problem Relation Age of Onset    Colon cancer Mother 61    Pancreatic cancer Father 62    Thyroid cancer Father     Coronary artery disease Paternal Grandmother        Social History     Social History    Marital status: /Civil Union     Spouse name: N/A    Number of children: N/A    Years of education: N/A     Occupational History    Not on file       Social History Main Topics    Smoking status: Former Smoker     Packs/day: 1 00     Years: 20 00    Smokeless tobacco: Never Used      Comment: quit 30 yrs ago    Alcohol use Yes     5 Glasses of wine per week      Comment: wine mainly on weekends / social per [facility] use: No    Sexual activity: No     Other Topics Concern    Not on file     Social History Narrative    Daily coffee consumption; 1 cp/day    Daily cola consumption; ____ cp/day    Daily tea consumption; 1 cp/day    No domestic violence history    Uses seatbelts             Current Outpatient Prescriptions:     ascorbic acid (VITAMIN C) 500 mg tablet, Take 500 mg by mouth daily  , Disp: , Rfl:     atorvastatin (LIPITOR) 10 mg tablet, Take 1 tablet (10 mg total) by mouth daily, Disp: 90 tablet, Rfl: 3    Calcium Carbonate-Vit D-Min (CALTRATE 600+D PLUS MINERALS PO), Take by mouth daily  , Disp: , Rfl:     cetirizine (ZyrTEC) 10 mg tablet, Take 10 mg by mouth daily  , Disp: , Rfl:     levothyroxine 100 mcg tablet, , Disp: , Rfl:     mometasone (NASONEX) 50 mcg/act nasal spray, 2 sprays into each nostril daily, Disp: 17 g, Rfl: 3    montelukast (SINGULAIR) 10 mg tablet, Take 10 mg by mouth daily  , Disp: , Rfl:     Multiple Vitamins-Minerals (CENTRUM SILVER PO), Take by mouth daily  , Disp: , Rfl:     omeprazole (PriLOSEC) 40 MG capsule, Take 40 mg by mouth daily  , Disp: , Rfl:     Allergies   Allergen Reactions    Avelox [Moxifloxacin] Hives    Augmentin [Amoxicillin-Pot Clavulanate] Rash       Objective   Vitals:    04/09/18 1007   BP: 142/86   BP Location: Left arm   Patient Position: Sitting   Cuff Size: Adult   Weight: 76 2 kg (168 lb)   Height: 5' 3 5" (1 613 m)     Physical Exam   Constitutional: She appears well-developed and well-nourished  No distress  Neck: No thyromegaly present  Cardiovascular: Normal rate, regular rhythm and normal heart sounds  No murmur heard  Pulmonary/Chest: Effort normal and breath sounds normal  No respiratory distress  She has no wheezes  Right breast exhibits no inverted nipple, no mass, no nipple discharge, no skin change and no tenderness  Left breast exhibits no inverted nipple, no mass, no nipple discharge, no skin change and no tenderness  Breasts are symmetrical    Abdominal: Soft  She exhibits no distension and no mass  There is no tenderness  Genitourinary: Vagina normal and uterus normal  Rectal exam shows no mass, no tenderness, anal tone normal and guaiac negative stool  Pelvic exam was performed with patient supine  There is no rash, tenderness or lesion on the right labia   There is no rash, tenderness or lesion on the left labia  Uterus is not deviated, not enlarged, not fixed and not tender  Cervix exhibits no motion tenderness, no discharge and no friability  Right adnexum displays no mass, no tenderness and no fullness  Left adnexum displays no mass, no tenderness and no fullness  No erythema, tenderness or bleeding in the vagina  No foreign body in the vagina  No vaginal discharge found  Musculoskeletal: She exhibits no edema  Lymphadenopathy:     She has no cervical adenopathy  She has no axillary adenopathy  Right: No inguinal adenopathy present  Left: No inguinal adenopathy present  Neurological: She is alert  Skin: Skin is warm  She is not diaphoretic  Psychiatric: She has a normal mood and affect  Her behavior is normal    Vitals reviewed

## 2018-04-10 DIAGNOSIS — J30.9 ALLERGIC RHINITIS, UNSPECIFIED SEASONALITY, UNSPECIFIED TRIGGER: Primary | ICD-10-CM

## 2018-04-10 RX ORDER — MONTELUKAST SODIUM 10 MG/1
10 TABLET ORAL
Qty: 90 TABLET | Refills: 2 | Status: SHIPPED | OUTPATIENT
Start: 2018-04-10 | End: 2018-10-19 | Stop reason: SDUPTHER

## 2018-04-11 LAB — THIN PREP CVX: NORMAL

## 2018-04-18 DIAGNOSIS — K21.9 GASTROESOPHAGEAL REFLUX DISEASE, ESOPHAGITIS PRESENCE NOT SPECIFIED: ICD-10-CM

## 2018-04-18 DIAGNOSIS — E03.9 HYPOTHYROIDISM, UNSPECIFIED TYPE: Primary | ICD-10-CM

## 2018-04-18 RX ORDER — LEVOTHYROXINE SODIUM 0.1 MG/1
TABLET ORAL
Qty: 60 TABLET | Refills: 5 | Status: SHIPPED | OUTPATIENT
Start: 2018-04-18 | End: 2019-01-09 | Stop reason: SDUPTHER

## 2018-04-18 RX ORDER — OMEPRAZOLE 40 MG/1
CAPSULE, DELAYED RELEASE ORAL
Qty: 90 CAPSULE | Refills: 1 | Status: SHIPPED | OUTPATIENT
Start: 2018-04-18 | End: 2018-07-18 | Stop reason: SDUPTHER

## 2018-05-09 ENCOUNTER — HOSPITAL ENCOUNTER (OUTPATIENT)
Dept: RADIOLOGY | Age: 73
Discharge: HOME | End: 2018-05-09
Payer: MEDICARE

## 2018-05-09 DIAGNOSIS — Z12.31 ENCOUNTER FOR SCREENING MAMMOGRAM FOR MALIGNANT NEOPLASM OF BREAST: ICD-10-CM

## 2018-05-09 PROCEDURE — 77067 SCR MAMMO BI INCL CAD: CPT

## 2018-05-09 PROCEDURE — 77063 BREAST TOMOSYNTHESIS BI: CPT

## 2018-06-14 DIAGNOSIS — J30.9 CHRONIC ALLERGIC RHINITIS: ICD-10-CM

## 2018-06-14 RX ORDER — MOMETASONE FUROATE 50 UG/1
SPRAY, METERED NASAL
Qty: 17 G | Refills: 5 | Status: SHIPPED | OUTPATIENT
Start: 2018-06-14 | End: 2018-10-19 | Stop reason: SDUPTHER

## 2018-07-09 ENCOUNTER — APPOINTMENT (OUTPATIENT)
Dept: LAB | Facility: CLINIC | Age: 73
End: 2018-07-09
Payer: MEDICARE

## 2018-07-09 DIAGNOSIS — K21.9 GASTRO-ESOPHAGEAL REFLUX DISEASE WITHOUT ESOPHAGITIS: ICD-10-CM

## 2018-07-09 DIAGNOSIS — E78.5 HYPERLIPIDEMIA: ICD-10-CM

## 2018-07-09 DIAGNOSIS — E04.1 NONTOXIC SINGLE THYROID NODULE: ICD-10-CM

## 2018-07-09 DIAGNOSIS — E03.9 HYPOTHYROIDISM: ICD-10-CM

## 2018-07-09 LAB
ALBUMIN SERPL BCP-MCNC: 3.5 G/DL (ref 3.5–5)
ALP SERPL-CCNC: 54 U/L (ref 46–116)
ALT SERPL W P-5'-P-CCNC: 25 U/L (ref 12–78)
ANION GAP SERPL CALCULATED.3IONS-SCNC: 4 MMOL/L (ref 4–13)
AST SERPL W P-5'-P-CCNC: 20 U/L (ref 5–45)
BASOPHILS # BLD AUTO: 0.02 THOUSANDS/ΜL (ref 0–0.1)
BASOPHILS NFR BLD AUTO: 0 % (ref 0–1)
BILIRUB SERPL-MCNC: 0.57 MG/DL (ref 0.2–1)
BUN SERPL-MCNC: 15 MG/DL (ref 5–25)
CALCIUM SERPL-MCNC: 9.2 MG/DL (ref 8.3–10.1)
CHLORIDE SERPL-SCNC: 109 MMOL/L (ref 100–108)
CHOLEST SERPL-MCNC: 181 MG/DL (ref 50–200)
CO2 SERPL-SCNC: 28 MMOL/L (ref 21–32)
CREAT SERPL-MCNC: 0.7 MG/DL (ref 0.6–1.3)
EOSINOPHIL # BLD AUTO: 0.13 THOUSAND/ΜL (ref 0–0.61)
EOSINOPHIL NFR BLD AUTO: 3 % (ref 0–6)
ERYTHROCYTE [DISTWIDTH] IN BLOOD BY AUTOMATED COUNT: 12.8 % (ref 11.6–15.1)
GFR SERPL CREATININE-BSD FRML MDRD: 87 ML/MIN/1.73SQ M
GLUCOSE P FAST SERPL-MCNC: 90 MG/DL (ref 65–99)
HCT VFR BLD AUTO: 41.2 % (ref 34.8–46.1)
HDLC SERPL-MCNC: 69 MG/DL (ref 40–60)
HGB BLD-MCNC: 12.9 G/DL (ref 11.5–15.4)
IMM GRANULOCYTES # BLD AUTO: 0.02 THOUSAND/UL (ref 0–0.2)
IMM GRANULOCYTES NFR BLD AUTO: 0 % (ref 0–2)
LDLC SERPL CALC-MCNC: 88 MG/DL (ref 0–100)
LYMPHOCYTES # BLD AUTO: 1.24 THOUSANDS/ΜL (ref 0.6–4.47)
LYMPHOCYTES NFR BLD AUTO: 26 % (ref 14–44)
MCH RBC QN AUTO: 29.8 PG (ref 26.8–34.3)
MCHC RBC AUTO-ENTMCNC: 31.3 G/DL (ref 31.4–37.4)
MCV RBC AUTO: 95 FL (ref 82–98)
MONOCYTES # BLD AUTO: 0.34 THOUSAND/ΜL (ref 0.17–1.22)
MONOCYTES NFR BLD AUTO: 7 % (ref 4–12)
NEUTROPHILS # BLD AUTO: 2.95 THOUSANDS/ΜL (ref 1.85–7.62)
NEUTS SEG NFR BLD AUTO: 64 % (ref 43–75)
NONHDLC SERPL-MCNC: 112 MG/DL
NRBC BLD AUTO-RTO: 0 /100 WBCS
PLATELET # BLD AUTO: 201 THOUSANDS/UL (ref 149–390)
PMV BLD AUTO: 11.7 FL (ref 8.9–12.7)
POTASSIUM SERPL-SCNC: 4.2 MMOL/L (ref 3.5–5.3)
PROT SERPL-MCNC: 6.9 G/DL (ref 6.4–8.2)
RBC # BLD AUTO: 4.33 MILLION/UL (ref 3.81–5.12)
SODIUM SERPL-SCNC: 141 MMOL/L (ref 136–145)
TRIGL SERPL-MCNC: 119 MG/DL
TSH SERPL DL<=0.05 MIU/L-ACNC: 0.67 UIU/ML (ref 0.36–3.74)
WBC # BLD AUTO: 4.7 THOUSAND/UL (ref 4.31–10.16)

## 2018-07-09 PROCEDURE — 36415 COLL VENOUS BLD VENIPUNCTURE: CPT

## 2018-07-09 PROCEDURE — 80053 COMPREHEN METABOLIC PANEL: CPT

## 2018-07-09 PROCEDURE — 80061 LIPID PANEL: CPT

## 2018-07-09 PROCEDURE — 84443 ASSAY THYROID STIM HORMONE: CPT

## 2018-07-09 PROCEDURE — 85025 COMPLETE CBC W/AUTO DIFF WBC: CPT

## 2018-07-18 ENCOUNTER — OFFICE VISIT (OUTPATIENT)
Dept: FAMILY MEDICINE CLINIC | Facility: CLINIC | Age: 73
End: 2018-07-18
Payer: MEDICARE

## 2018-07-18 VITALS
WEIGHT: 156.6 LBS | BODY MASS INDEX: 26.73 KG/M2 | TEMPERATURE: 97.8 F | DIASTOLIC BLOOD PRESSURE: 92 MMHG | HEART RATE: 88 BPM | SYSTOLIC BLOOD PRESSURE: 138 MMHG | HEIGHT: 64 IN

## 2018-07-18 DIAGNOSIS — E03.8 HYPOTHYROIDISM DUE TO HASHIMOTO'S THYROIDITIS: ICD-10-CM

## 2018-07-18 DIAGNOSIS — R03.0 WHITE COAT SYNDROME WITH HIGH BLOOD PRESSURE WITHOUT HYPERTENSION: ICD-10-CM

## 2018-07-18 DIAGNOSIS — E04.1 NONTOXIC SINGLE THYROID NODULE: ICD-10-CM

## 2018-07-18 DIAGNOSIS — E78.2 MIXED HYPERLIPIDEMIA: ICD-10-CM

## 2018-07-18 DIAGNOSIS — K21.9 GASTROESOPHAGEAL REFLUX DISEASE, ESOPHAGITIS PRESENCE NOT SPECIFIED: Primary | ICD-10-CM

## 2018-07-18 DIAGNOSIS — E06.3 HYPOTHYROIDISM DUE TO HASHIMOTO'S THYROIDITIS: ICD-10-CM

## 2018-07-18 PROCEDURE — 99214 OFFICE O/P EST MOD 30 MIN: CPT | Performed by: FAMILY MEDICINE

## 2018-07-18 RX ORDER — MINOCYCLINE HYDROCHLORIDE 1 MG/1
POWDER ORAL
COMMUNITY
Start: 2018-05-30 | End: 2019-03-07

## 2018-07-18 RX ORDER — LEVOTHYROXINE SODIUM 88 UG/1
88 TABLET ORAL DAILY
COMMUNITY
End: 2019-01-16 | Stop reason: ALTCHOICE

## 2018-07-18 RX ORDER — OMEPRAZOLE 20 MG/1
20 CAPSULE, DELAYED RELEASE ORAL DAILY
Qty: 30 CAPSULE | Refills: 5 | Status: SHIPPED | OUTPATIENT
Start: 2018-07-18 | End: 2018-08-07 | Stop reason: SDUPTHER

## 2018-07-18 NOTE — PROGRESS NOTES
Assessment/Plan:    Hyperlipidemia  At goal  Cont present care  Recheck 6m    White coat syndrome with high blood pressure without hypertension  Home Bps stable  Cont to monitor    Nontoxic single thyroid nodule  Cont to monitor u/s and TSH    Hypothyroidism  Cont to monitor    Esophageal reflux  Stable  Attempt to wean off of prilosec  Recheck 6m       Diagnoses and all orders for this visit:    Gastroesophageal reflux disease, esophagitis presence not specified  -     omeprazole (PriLOSEC) 20 mg delayed release capsule; Take 1 capsule (20 mg total) by mouth daily    Hypothyroidism due to Hashimoto's thyroiditis    Nontoxic single thyroid nodule    White coat syndrome with high blood pressure without hypertension    Mixed hyperlipidemia    Other orders  -     Cancel: Ambulatory referral to Colorectal Surgery; Future  -     Cancel: Ambulatory referral to Ophthalmology; Future  -     levothyroxine 88 mcg tablet; Take 88 mcg by mouth daily  -     ARESTIN 1 MG MISC;           Subjective:      Patient ID: Deysi Rhoades is a 72 y o  female  f/u multiple med issues  - pt has been working on her diet  Using a calorie counter to monitor  Has lost 12 lbs so far and feels well  - no CP, palp, lightheadedness or other CV symptoms  - no GI issues  Had colonoscopy yesterday  Interested in trying to get off of prilosec  - allergies a little worse  Using Nasonex, zyrtec on reg basis  - no other concerns        The following portions of the patient's history were reviewed and updated as appropriate:   She  has a past medical history of Acid reflux; Basal cell carcinoma; Dental bridge present; Disease of thyroid gland; Hashimoto's disease; High cholesterol; Hypertension; Osteopenia; Seasonal allergies; Thyroid disease; and Wears glasses    She   Patient Active Problem List    Diagnosis Date Noted    White coat syndrome with high blood pressure without hypertension 04/12/2017    Basal cell carcinoma of skin of nose 08/16/2016    Esophageal reflux 09/26/2012    Hyperlipidemia 09/26/2012    Hypothyroidism 09/26/2012    Nontoxic single thyroid nodule 09/26/2012     She  has a past surgical history that includes Skin cancer excision; Tonsillectomy; Esophagogastroduodenoscopy; Colonoscopy; Skin biopsy; and MOHS RECONSTRUCTION (N/A, 10/4/2016)  She  reports that she has quit smoking  She has a 20 00 pack-year smoking history  She has never used smokeless tobacco  She reports that she drinks alcohol  She reports that she does not use drugs  Current Outpatient Prescriptions   Medication Sig Dispense Refill    ascorbic acid (VITAMIN C) 500 mg tablet Take 500 mg by mouth daily   atorvastatin (LIPITOR) 10 mg tablet Take 1 tablet (10 mg total) by mouth daily 90 tablet 3    Calcium Carbonate-Vit D-Min (CALTRATE 600+D PLUS MINERALS PO) Take by mouth daily   cetirizine (ZyrTEC) 10 mg tablet Take 10 mg by mouth daily   levothyroxine 88 mcg tablet Take 88 mcg by mouth daily      mometasone (NASONEX) 50 mcg/act nasal spray USE 2 SPRAYS IN EACH  NOSTRIL DAILY 17 g 5    montelukast (SINGULAIR) 10 mg tablet Take 1 tablet (10 mg total) by mouth daily at bedtime 90 tablet 2    Multiple Vitamins-Minerals (CENTRUM SILVER PO) Take by mouth daily   omeprazole (PriLOSEC) 20 mg delayed release capsule Take 1 capsule (20 mg total) by mouth daily 30 capsule 5    ARESTIN 1 MG MISC       levothyroxine 100 mcg tablet TAKE 1 TABLET DAILY 60 tablet 5     No current facility-administered medications for this visit  She is allergic to avelox [moxifloxacin] and augmentin [amoxicillin-pot clavulanate]       Review of Systems   Constitutional: Negative  HENT: Negative  Eyes: Negative  Respiratory: Negative  Cardiovascular: Negative  Gastrointestinal: Negative  Endocrine: Negative  Genitourinary: Negative  Musculoskeletal: Negative  Skin: Negative  Allergic/Immunologic: Negative      Neurological: Negative  Hematological: Negative  Psychiatric/Behavioral: Negative  Objective:      /92   Pulse 88   Temp 97 8 °F (36 6 °C)   Ht 5' 3 5" (1 613 m)   Wt 71 kg (156 lb 9 6 oz)   BMI 27 31 kg/m²          Physical Exam   Constitutional: She is oriented to person, place, and time  She appears well-developed and well-nourished  HENT:   Head: Normocephalic and atraumatic  Right Ear: External ear normal    Left Ear: External ear normal    Nose: Nose normal    Mouth/Throat: Oropharynx is clear and moist  No oropharyngeal exudate  Eyes: Conjunctivae and EOM are normal  Pupils are equal, round, and reactive to light  Neck: Normal range of motion  Neck supple  No JVD present  Thyromegaly (thyroid nodule) present  Cardiovascular: Normal rate, regular rhythm and intact distal pulses  No murmur heard  Pulmonary/Chest: Effort normal and breath sounds normal    Abdominal: Soft  She exhibits no distension  There is no tenderness  Musculoskeletal: Normal range of motion  Lymphadenopathy:     She has no cervical adenopathy  Neurological: She is alert and oriented to person, place, and time  She has normal reflexes  She exhibits normal muscle tone  Coordination normal    Skin: Skin is warm and dry  She is not diaphoretic  Psychiatric: She has a normal mood and affect  Vitals reviewed

## 2018-08-07 ENCOUNTER — TELEPHONE (OUTPATIENT)
Dept: FAMILY MEDICINE CLINIC | Facility: CLINIC | Age: 73
End: 2018-08-07

## 2018-08-07 DIAGNOSIS — K21.9 GASTROESOPHAGEAL REFLUX DISEASE, ESOPHAGITIS PRESENCE NOT SPECIFIED: ICD-10-CM

## 2018-08-07 RX ORDER — OMEPRAZOLE 40 MG/1
40 CAPSULE, DELAYED RELEASE ORAL DAILY
Qty: 30 CAPSULE | Refills: 5 | Status: SHIPPED | OUTPATIENT
Start: 2018-08-07 | End: 2018-10-19 | Stop reason: SDUPTHER

## 2018-08-07 NOTE — TELEPHONE ENCOUNTER
Patient called stating you lowered her omeprazole to 20mg  Since lowering the medication she has been getting the old symptoms she once had  Would you like patient to up her medication again or try a different medication?

## 2018-09-25 ENCOUNTER — IMMUNIZATION (OUTPATIENT)
Dept: FAMILY MEDICINE CLINIC | Facility: CLINIC | Age: 73
End: 2018-09-25
Payer: MEDICARE

## 2018-09-25 DIAGNOSIS — Z23 ENCOUNTER FOR IMMUNIZATION: ICD-10-CM

## 2018-09-25 PROCEDURE — G0008 ADMIN INFLUENZA VIRUS VAC: HCPCS

## 2018-09-25 PROCEDURE — 90662 IIV NO PRSV INCREASED AG IM: CPT

## 2018-10-18 ENCOUNTER — TELEPHONE (OUTPATIENT)
Dept: FAMILY MEDICINE CLINIC | Facility: CLINIC | Age: 73
End: 2018-10-18

## 2018-10-18 DIAGNOSIS — J32.9 SINUSITIS, UNSPECIFIED CHRONICITY, UNSPECIFIED LOCATION: Primary | ICD-10-CM

## 2018-10-18 RX ORDER — AMOXICILLIN 875 MG/1
875 TABLET, COATED ORAL 2 TIMES DAILY
Qty: 20 TABLET | Refills: 0 | Status: SHIPPED | OUTPATIENT
Start: 2018-10-18 | End: 2018-10-28

## 2018-10-19 DIAGNOSIS — J30.9 ALLERGIC RHINITIS, UNSPECIFIED SEASONALITY, UNSPECIFIED TRIGGER: ICD-10-CM

## 2018-10-19 DIAGNOSIS — J30.9 CHRONIC ALLERGIC RHINITIS: ICD-10-CM

## 2018-10-19 DIAGNOSIS — K21.9 GASTROESOPHAGEAL REFLUX DISEASE, ESOPHAGITIS PRESENCE NOT SPECIFIED: ICD-10-CM

## 2018-10-19 RX ORDER — OMEPRAZOLE 40 MG/1
CAPSULE, DELAYED RELEASE ORAL
Qty: 90 CAPSULE | Refills: 1 | Status: SHIPPED | OUTPATIENT
Start: 2018-10-19 | End: 2019-04-05 | Stop reason: SDUPTHER

## 2018-10-19 RX ORDER — MONTELUKAST SODIUM 10 MG/1
TABLET ORAL
Qty: 90 TABLET | Refills: 1 | Status: SHIPPED | OUTPATIENT
Start: 2018-10-19 | End: 2019-03-07

## 2018-10-19 RX ORDER — MOMETASONE FUROATE 50 UG/1
SPRAY, METERED NASAL
Qty: 51 G | Refills: 1 | Status: SHIPPED | OUTPATIENT
Start: 2018-10-19 | End: 2019-04-05 | Stop reason: SDUPTHER

## 2018-11-28 ENCOUNTER — TELEPHONE (OUTPATIENT)
Dept: FAMILY MEDICINE CLINIC | Facility: CLINIC | Age: 73
End: 2018-11-28

## 2018-11-28 DIAGNOSIS — J01.10 ACUTE NON-RECURRENT FRONTAL SINUSITIS: Primary | ICD-10-CM

## 2018-11-28 RX ORDER — AZITHROMYCIN 250 MG/1
TABLET, FILM COATED ORAL
Qty: 6 TABLET | Refills: 0 | Status: SHIPPED | OUTPATIENT
Start: 2018-11-28 | End: 2018-12-02

## 2018-12-12 DIAGNOSIS — E78.2 MIXED HYPERLIPIDEMIA: ICD-10-CM

## 2018-12-13 RX ORDER — ATORVASTATIN CALCIUM 10 MG/1
TABLET, FILM COATED ORAL
Qty: 90 TABLET | Refills: 1 | Status: SHIPPED | OUTPATIENT
Start: 2018-12-13 | End: 2019-06-21 | Stop reason: SDUPTHER

## 2018-12-17 ENCOUNTER — OFFICE VISIT (OUTPATIENT)
Dept: PLASTIC SURGERY | Facility: CLINIC | Age: 73
End: 2018-12-17
Payer: MEDICARE

## 2018-12-17 DIAGNOSIS — D17.1 LIPOMA OF BACK: Primary | ICD-10-CM

## 2018-12-17 PROCEDURE — 99214 OFFICE O/P EST MOD 30 MIN: CPT | Performed by: PHYSICIAN ASSISTANT

## 2018-12-17 NOTE — PROGRESS NOTES
Assessment/Plan:  Jens King is a pleasant 72year old female who presents for treatment of a mid upper back lipoma  Please see HPI  She was referred to us by Dr Suzanna Ruiz  We previously have treated her for basal cell carcinoma of the nose with a bilopbed flap  I recommended excision of the mid upper back lipoma with complex closure  She understood and agreed  We discussed with the patient the options, benefits, and risks of surgery such as anesthesia, bleeding, infection, scarring and the need for additional procedures  Consent was obtained and all questions answered to her satisfaction  We will plan for surgery at her earliest convenience  Diagnoses and all orders for this visit:    Lipoma of back          Subjective:      Patient ID: Prasanna Bowers is a 72 y o  female  HPI   Jens King is a pleasant 72year old female who presents for treatment of a mid upper back lipoma  She states that the lipoma has been present for approximately 3-4 years  It has been increasing in size  The following portions of the patient's history were reviewed and updated as appropriate: allergies, current medications, past family history, past medical history, past social history, past surgical history and problem list     Review of Systems   HENT: Negative for hearing loss  Eyes: Negative for visual disturbance  Respiratory: Negative for shortness of breath  Cardiovascular: Negative for chest pain  Gastrointestinal: Negative for abdominal pain, blood in stool, constipation, diarrhea, nausea and vomiting  Genitourinary: Negative for hematuria  Musculoskeletal: Negative for gait problem  Skin:        As per HPI  Neurological: Negative for seizures and headaches  Hematological: Does not bruise/bleed easily  Psychiatric/Behavioral: The patient is not nervous/anxious  Objective: There were no vitals taken for this visit           Physical Exam   Constitutional: She is oriented to person, place, and time  She appears well-developed and well-nourished  No distress  HENT:   Head: Normocephalic and atraumatic  Eyes: Pupils are equal, round, and reactive to light  EOM are normal  No scleral icterus  Neck: Neck supple  No tracheal deviation present  No thyromegaly present  Cardiovascular: Normal rate and regular rhythm  Exam reveals no gallop and no friction rub  No murmur heard  Pulmonary/Chest: Effort normal and breath sounds normal  She has no wheezes  She has no rales  Abdominal: Soft  Bowel sounds are normal  She exhibits no distension  There is no tenderness  There is no rebound and no guarding  Musculoskeletal: Normal range of motion  Lymphadenopathy:     She has no cervical adenopathy  Neurological: She is alert and oriented to person, place, and time  No cranial nerve deficit  Skin:   Mid upper back lipoma noted  It measures 8cm x 8cm  Please see photos  Psychiatric: She has a normal mood and affect

## 2018-12-17 NOTE — LETTER
December 17, 2018     Magda Darden MD  [address]    Patient: Prasanna Bowers   YOB: 1945   Date of Visit: 12/17/2018       Dear Dr Nataliia Huitron:    Thank you for referring Gissell Stewart to me for evaluation  Below are my notes for this consultation  If you have questions, please do not hesitate to call me  I look forward to following your patient along with you  Sincerely,        Barbara Baptiste PA-C        CC: Chris Diane, DO Barbara Baptiste PA-C  12/17/2018  4:34 PM  Sign at close encounter  Assessment/Plan:  Jens King is a pleasant 72year old female who presents for treatment of a mid upper back lipoma  Please see HPI  She was referred to us by Dr Suzanna Ruiz  We previously have treated her for basal cell carcinoma of the nose with a bilopbed flap  I recommended excision of the mid upper back lipoma with complex closure  She understood and agreed  We discussed with the patient the options, benefits, and risks of surgery such as anesthesia, bleeding, infection, scarring and the need for additional procedures  Consent was obtained and all questions answered to her satisfaction  We will plan for surgery at her earliest convenience  Diagnoses and all orders for this visit:    Lipoma of back          Subjective:      Patient ID: Prasanna Bowers is a 72 y o  female  HPI   Jens King is a pleasant 72year old female who presents for treatment of a mid upper back lipoma  She states that the lipoma has been present for approximately 3-4 years  It has been increasing in size  The following portions of the patient's history were reviewed and updated as appropriate: allergies, current medications, past family history, past medical history, past social history, past surgical history and problem list     Review of Systems   HENT: Negative for hearing loss  Eyes: Negative for visual disturbance     Respiratory: Negative for shortness of breath  Cardiovascular: Negative for chest pain  Gastrointestinal: Negative for abdominal pain, blood in stool, constipation, diarrhea, nausea and vomiting  Genitourinary: Negative for hematuria  Musculoskeletal: Negative for gait problem  Skin:        As per HPI  Neurological: Negative for seizures and headaches  Hematological: Does not bruise/bleed easily  Psychiatric/Behavioral: The patient is not nervous/anxious  Objective: There were no vitals taken for this visit  Physical Exam   Constitutional: She is oriented to person, place, and time  She appears well-developed and well-nourished  No distress  HENT:   Head: Normocephalic and atraumatic  Eyes: Pupils are equal, round, and reactive to light  EOM are normal  No scleral icterus  Neck: Neck supple  No tracheal deviation present  No thyromegaly present  Cardiovascular: Normal rate and regular rhythm  Exam reveals no gallop and no friction rub  No murmur heard  Pulmonary/Chest: Effort normal and breath sounds normal  She has no wheezes  She has no rales  Abdominal: Soft  Bowel sounds are normal  She exhibits no distension  There is no tenderness  There is no rebound and no guarding  Musculoskeletal: Normal range of motion  Lymphadenopathy:     She has no cervical adenopathy  Neurological: She is alert and oriented to person, place, and time  No cranial nerve deficit  Skin:   Mid upper back lipoma noted  It measures 8cm x 8cm  Please see photos  Psychiatric: She has a normal mood and affect

## 2018-12-18 PROBLEM — D17.1 LIPOMA OF BACK: Status: ACTIVE | Noted: 2018-12-18

## 2019-01-09 DIAGNOSIS — E03.9 HYPOTHYROIDISM, UNSPECIFIED TYPE: ICD-10-CM

## 2019-01-16 RX ORDER — LEVOTHYROXINE SODIUM 0.1 MG/1
TABLET ORAL
Qty: 90 TABLET | Refills: 1 | Status: SHIPPED | OUTPATIENT
Start: 2019-01-16 | End: 2019-07-18 | Stop reason: SDUPTHER

## 2019-01-23 ENCOUNTER — OFFICE VISIT (OUTPATIENT)
Dept: FAMILY MEDICINE CLINIC | Facility: CLINIC | Age: 74
End: 2019-01-23
Payer: COMMERCIAL

## 2019-01-23 ENCOUNTER — APPOINTMENT (OUTPATIENT)
Dept: LAB | Facility: CLINIC | Age: 74
End: 2019-01-23
Payer: COMMERCIAL

## 2019-01-23 VITALS
WEIGHT: 155 LBS | HEIGHT: 64 IN | DIASTOLIC BLOOD PRESSURE: 74 MMHG | BODY MASS INDEX: 26.46 KG/M2 | TEMPERATURE: 98.6 F | HEART RATE: 78 BPM | SYSTOLIC BLOOD PRESSURE: 123 MMHG

## 2019-01-23 DIAGNOSIS — R03.0 WHITE COAT SYNDROME WITH HIGH BLOOD PRESSURE WITHOUT HYPERTENSION: ICD-10-CM

## 2019-01-23 DIAGNOSIS — E06.3 HYPOTHYROIDISM DUE TO HASHIMOTO'S THYROIDITIS: ICD-10-CM

## 2019-01-23 DIAGNOSIS — E78.2 MIXED HYPERLIPIDEMIA: ICD-10-CM

## 2019-01-23 DIAGNOSIS — Z00.00 MEDICARE ANNUAL WELLNESS VISIT, SUBSEQUENT: Primary | ICD-10-CM

## 2019-01-23 DIAGNOSIS — Z23 ENCOUNTER FOR IMMUNIZATION: ICD-10-CM

## 2019-01-23 DIAGNOSIS — Z11.59 NEED FOR HEPATITIS C SCREENING TEST: ICD-10-CM

## 2019-01-23 DIAGNOSIS — R73.09 ELEVATED GLUCOSE LEVEL: ICD-10-CM

## 2019-01-23 DIAGNOSIS — D17.1 LIPOMA OF BACK: ICD-10-CM

## 2019-01-23 DIAGNOSIS — E03.8 HYPOTHYROIDISM DUE TO HASHIMOTO'S THYROIDITIS: ICD-10-CM

## 2019-01-23 LAB
ALBUMIN SERPL BCP-MCNC: 4.2 G/DL (ref 3.5–5)
ALP SERPL-CCNC: 60 U/L (ref 46–116)
ALT SERPL W P-5'-P-CCNC: 25 U/L (ref 12–78)
ANION GAP SERPL CALCULATED.3IONS-SCNC: 5 MMOL/L (ref 4–13)
AST SERPL W P-5'-P-CCNC: 19 U/L (ref 5–45)
BASOPHILS # BLD AUTO: 0.01 THOUSANDS/ΜL (ref 0–0.1)
BASOPHILS NFR BLD AUTO: 0 % (ref 0–1)
BILIRUB SERPL-MCNC: 0.63 MG/DL (ref 0.2–1)
BUN SERPL-MCNC: 19 MG/DL (ref 5–25)
CALCIUM SERPL-MCNC: 9.9 MG/DL (ref 8.3–10.1)
CHLORIDE SERPL-SCNC: 105 MMOL/L (ref 100–108)
CHOLEST SERPL-MCNC: 219 MG/DL (ref 50–200)
CO2 SERPL-SCNC: 27 MMOL/L (ref 21–32)
CREAT SERPL-MCNC: 0.69 MG/DL (ref 0.6–1.3)
EOSINOPHIL # BLD AUTO: 0.14 THOUSAND/ΜL (ref 0–0.61)
EOSINOPHIL NFR BLD AUTO: 3 % (ref 0–6)
ERYTHROCYTE [DISTWIDTH] IN BLOOD BY AUTOMATED COUNT: 12.7 % (ref 11.6–15.1)
GFR SERPL CREATININE-BSD FRML MDRD: 88 ML/MIN/1.73SQ M
GLUCOSE P FAST SERPL-MCNC: 114 MG/DL (ref 65–99)
HCT VFR BLD AUTO: 43.7 % (ref 34.8–46.1)
HCV AB SER QL: NORMAL
HDLC SERPL-MCNC: 86 MG/DL (ref 40–60)
HGB BLD-MCNC: 13.7 G/DL (ref 11.5–15.4)
IMM GRANULOCYTES # BLD AUTO: 0.01 THOUSAND/UL (ref 0–0.2)
IMM GRANULOCYTES NFR BLD AUTO: 0 % (ref 0–2)
LDLC SERPL CALC-MCNC: 107 MG/DL (ref 0–100)
LYMPHOCYTES # BLD AUTO: 1.27 THOUSANDS/ΜL (ref 0.6–4.47)
LYMPHOCYTES NFR BLD AUTO: 27 % (ref 14–44)
MCH RBC QN AUTO: 29.8 PG (ref 26.8–34.3)
MCHC RBC AUTO-ENTMCNC: 31.4 G/DL (ref 31.4–37.4)
MCV RBC AUTO: 95 FL (ref 82–98)
MONOCYTES # BLD AUTO: 0.31 THOUSAND/ΜL (ref 0.17–1.22)
MONOCYTES NFR BLD AUTO: 7 % (ref 4–12)
NEUTROPHILS # BLD AUTO: 2.9 THOUSANDS/ΜL (ref 1.85–7.62)
NEUTS SEG NFR BLD AUTO: 63 % (ref 43–75)
NONHDLC SERPL-MCNC: 133 MG/DL
NRBC BLD AUTO-RTO: 0 /100 WBCS
PLATELET # BLD AUTO: 182 THOUSANDS/UL (ref 149–390)
PMV BLD AUTO: 12.1 FL (ref 8.9–12.7)
POTASSIUM SERPL-SCNC: 5 MMOL/L (ref 3.5–5.3)
PROT SERPL-MCNC: 7.7 G/DL (ref 6.4–8.2)
RBC # BLD AUTO: 4.59 MILLION/UL (ref 3.81–5.12)
SODIUM SERPL-SCNC: 137 MMOL/L (ref 136–145)
TRIGL SERPL-MCNC: 132 MG/DL
TSH SERPL DL<=0.05 MIU/L-ACNC: 0.43 UIU/ML (ref 0.36–3.74)
WBC # BLD AUTO: 4.64 THOUSAND/UL (ref 4.31–10.16)

## 2019-01-23 PROCEDURE — 85025 COMPLETE CBC W/AUTO DIFF WBC: CPT

## 2019-01-23 PROCEDURE — 3008F BODY MASS INDEX DOCD: CPT | Performed by: FAMILY MEDICINE

## 2019-01-23 PROCEDURE — 86803 HEPATITIS C AB TEST: CPT | Performed by: FAMILY MEDICINE

## 2019-01-23 PROCEDURE — 1160F RVW MEDS BY RX/DR IN RCRD: CPT | Performed by: FAMILY MEDICINE

## 2019-01-23 PROCEDURE — 1170F FXNL STATUS ASSESSED: CPT | Performed by: FAMILY MEDICINE

## 2019-01-23 PROCEDURE — 99214 OFFICE O/P EST MOD 30 MIN: CPT | Performed by: FAMILY MEDICINE

## 2019-01-23 PROCEDURE — 36415 COLL VENOUS BLD VENIPUNCTURE: CPT | Performed by: FAMILY MEDICINE

## 2019-01-23 PROCEDURE — 90714 TD VACC NO PRESV 7 YRS+ IM: CPT | Performed by: FAMILY MEDICINE

## 2019-01-23 PROCEDURE — 80053 COMPREHEN METABOLIC PANEL: CPT

## 2019-01-23 PROCEDURE — 83036 HEMOGLOBIN GLYCOSYLATED A1C: CPT

## 2019-01-23 PROCEDURE — 80061 LIPID PANEL: CPT | Performed by: FAMILY MEDICINE

## 2019-01-23 PROCEDURE — 84443 ASSAY THYROID STIM HORMONE: CPT

## 2019-01-23 PROCEDURE — G0439 PPPS, SUBSEQ VISIT: HCPCS | Performed by: FAMILY MEDICINE

## 2019-01-23 PROCEDURE — 90471 IMMUNIZATION ADMIN: CPT | Performed by: FAMILY MEDICINE

## 2019-01-23 PROCEDURE — 1125F AMNT PAIN NOTED PAIN PRSNT: CPT | Performed by: FAMILY MEDICINE

## 2019-01-23 NOTE — PROGRESS NOTES
Assessment/Plan:    Hypothyroidism  Appears to be stable clinically  Check TSH  Adjust meds if TSH is not at goal  Recheck 6m    White coat syndrome with high blood pressure without hypertension  BP stable today  Cont to monitor    Hyperlipidemia  Check labs  Recheck 6m    Lipoma of back  For excision  F/u with surgery       Diagnoses and all orders for this visit:    Hypothyroidism due to Hashimoto's thyroiditis  -     TSH, 3rd generation; Future    White coat syndrome with high blood pressure without hypertension  -     CBC and differential; Future    Mixed hyperlipidemia  -     Lipid panel  -     Comprehensive metabolic panel; Future    Lipoma of back    Medicare annual wellness visit, subsequent    Need for hepatitis C screening test  -     Hepatitis C antibody    Encounter for immunization  -     TD VACCINE GREATER THAN OR EQUAL TO 6YO PRESERVATIVE FREE IM          Subjective:      Patient ID: Zoraida Chandler is a 72 y o  female  f/u multiple med issues and subsequent AWV  - pt feels well  Continues to work on her diet  Has not lost any more weight but it is stable  - pt denies CP, palp, lightheadedness or other CV symptoms with or without exertion  - no GI issues  Up to date with colonoscopy  - back lipoma seems to be a little larger  Pt to have excision in March  - no other concerns        The following portions of the patient's history were reviewed and updated as appropriate: She  has a past medical history of Acid reflux; Basal cell carcinoma; Dental bridge present; Disease of thyroid gland; Hashimoto's disease; High cholesterol; Hypertension; Osteopenia; Seasonal allergies; Thyroid disease; and Wears glasses    She   Patient Active Problem List    Diagnosis Date Noted    Lipoma of back 12/18/2018    White coat syndrome with high blood pressure without hypertension 04/12/2017    Basal cell carcinoma of skin of nose 08/16/2016    Esophageal reflux 09/26/2012    Hyperlipidemia 09/26/2012    Hypothyroidism 09/26/2012    Nontoxic single thyroid nodule 09/26/2012     She  has a past surgical history that includes Skin cancer excision; Tonsillectomy; Esophagogastroduodenoscopy; Colonoscopy; Skin biopsy; and MOHS RECONSTRUCTION (N/A, 10/4/2016)  She  reports that she has quit smoking  She has a 20 00 pack-year smoking history  She has never used smokeless tobacco  She reports that she drinks alcohol  She reports that she does not use drugs  Current Outpatient Prescriptions   Medication Sig Dispense Refill    ARESTIN 1 MG MISC       ascorbic acid (VITAMIN C) 500 mg tablet Take 500 mg by mouth daily   atorvastatin (LIPITOR) 10 mg tablet TAKE 1 TABLET BY MOUTH  DAILY 90 tablet 1    Calcium Carbonate-Vit D-Min (CALTRATE 600+D PLUS MINERALS PO) Take by mouth daily   cetirizine (ZyrTEC) 10 mg tablet Take 10 mg by mouth daily   levothyroxine 100 mcg tablet TAKE 1 TABLET DAILY 90 tablet 1    mometasone (NASONEX) 50 mcg/act nasal spray USE 2 SPRAYS IN EACH  NOSTRIL DAILY 51 g 1    montelukast (SINGULAIR) 10 mg tablet TAKE 1 TABLET BY MOUTH  DAILY AT BEDTIME 90 tablet 1    Multiple Vitamins-Minerals (CENTRUM SILVER PO) Take by mouth daily   omeprazole (PriLOSEC) 40 MG capsule TAKE 1 CAPSULE DAILY 90 capsule 1     No current facility-administered medications for this visit  She is allergic to avelox [moxifloxacin] and augmentin [amoxicillin-pot clavulanate]       Review of Systems   Constitutional: Negative  HENT: Negative  Eyes: Negative  Respiratory: Negative  Cardiovascular: Negative  Gastrointestinal: Negative  Endocrine: Negative  Genitourinary: Negative  Musculoskeletal: Negative  Skin:        Upper back lipoma without skin changes   Allergic/Immunologic: Negative  Neurological: Negative  Hematological: Negative  Psychiatric/Behavioral: Negative            Objective:      /74   Pulse 78   Temp 98 6 °F (37 °C)   Ht 5' 3 5" (1 613 m) Wt 70 3 kg (155 lb)   BMI 27 03 kg/m²          Physical Exam   Constitutional: She is oriented to person, place, and time  She appears well-developed and well-nourished  HENT:   Head: Normocephalic and atraumatic  Right Ear: External ear normal    Left Ear: External ear normal    Nose: Nose normal    Mouth/Throat: Oropharynx is clear and moist  No oropharyngeal exudate  Eyes: Pupils are equal, round, and reactive to light  Conjunctivae and EOM are normal    Neck: Normal range of motion  Neck supple  No JVD present  Thyromegaly (thyroid nodule - unchanged) present  Cardiovascular: Normal rate, regular rhythm and intact distal pulses  No murmur heard  Pulmonary/Chest: Effort normal and breath sounds normal    Abdominal: Soft  She exhibits no distension and no mass  There is no tenderness  Musculoskeletal: Normal range of motion  Upper back lipoma   Lymphadenopathy:     She has no cervical adenopathy  Neurological: She is alert and oriented to person, place, and time  She has normal reflexes  No cranial nerve deficit  She exhibits normal muscle tone  Coordination normal    MMSE 30/30   Skin: Skin is warm  She is not diaphoretic  Psychiatric: She has a normal mood and affect  Her behavior is normal  Judgment and thought content normal    PHQ-2 = 0   Vitals reviewed

## 2019-01-23 NOTE — PROGRESS NOTES
Assessment and Plan:    Problem List Items Addressed This Visit        Endocrine    Hypothyroidism     Appears to be stable clinically  Check TSH  Adjust meds if TSH is not at goal  Recheck 6m         Relevant Orders    TSH, 3rd generation (Completed)       Cardiovascular and Mediastinum    White coat syndrome with high blood pressure without hypertension     BP stable today  Cont to monitor         Relevant Orders    CBC and differential (Completed)       Other    Hyperlipidemia     Check labs  Recheck 6m         Relevant Orders    Lipid panel (Completed)    Comprehensive metabolic panel (Completed)    Lipoma of back     For excision  F/u with surgery           Other Visit Diagnoses     Medicare annual wellness visit, subsequent    -  Primary    Need for hepatitis C screening test        Relevant Orders    Hepatitis C antibody (Completed)    Encounter for immunization        Relevant Orders    TD VACCINE GREATER THAN OR EQUAL TO 6YO PRESERVATIVE FREE IM (Completed)        Health Maintenance Due   Topic Date Due    Medicare Annual Wellness Visit (AWV)  1945    DTaP,Tdap,and Td Vaccines (1 - Tdap) 01/24/2019         HPI:  Holly Rene is a 72 y o  female here for her Subsequent Wellness Visit      Patient Active Problem List   Diagnosis    Esophageal reflux    Hyperlipidemia    Hypothyroidism    White coat syndrome with high blood pressure without hypertension    Basal cell carcinoma of skin of nose    Nontoxic single thyroid nodule    Lipoma of back     Past Medical History:   Diagnosis Date    Acid reflux     Basal cell carcinoma     basal cell on back and nose    Dental bridge present     upper    Disease of thyroid gland     Hashimoto's disease     High cholesterol     Hypertension     not on meds    Osteopenia     Seasonal allergies     mold,pollen    Thyroid disease     thyroid nodules    Wears glasses      Past Surgical History:   Procedure Laterality Date    COLONOSCOPY      ESOPHAGOGASTRODUODENOSCOPY      MOHS RECONSTRUCTION N/A 10/4/2016    Procedure: RECONSTRUCTION MOHS DEFECT NASAL TIP WITH FLAP;  Surgeon: Marielos Martin MD;  Location: [facility];  Service:     SKIN BIOPSY      SKIN CANCER EXCISION      basal cell CA on back and nose-2009 and 2016    TONSILLECTOMY       Family History   Problem Relation Age of Onset    Colon cancer Mother 61    Pancreatic cancer Father 62    Thyroid cancer Father     Coronary artery disease Paternal Grandmother      History   Smoking Status    Former Smoker    Packs/day: 1 00    Years: 20 00   Smokeless Tobacco    Never Used     Comment: quit 30 yrs ago     History   Alcohol Use    Yes    5 Glasses of wine per week     Comment: wine mainly on weekends / social per [employer]      History   Drug Use No       Current Outpatient Prescriptions   Medication Sig Dispense Refill    ARESTIN 1 MG MISC       ascorbic acid (VITAMIN C) 500 mg tablet Take 500 mg by mouth daily   atorvastatin (LIPITOR) 10 mg tablet TAKE 1 TABLET BY MOUTH  DAILY 90 tablet 1    Calcium Carbonate-Vit D-Min (CALTRATE 600+D PLUS MINERALS PO) Take by mouth daily   cetirizine (ZyrTEC) 10 mg tablet Take 10 mg by mouth daily   levothyroxine 100 mcg tablet TAKE 1 TABLET DAILY 90 tablet 1    mometasone (NASONEX) 50 mcg/act nasal spray USE 2 SPRAYS IN EACH  NOSTRIL DAILY 51 g 1    montelukast (SINGULAIR) 10 mg tablet TAKE 1 TABLET BY MOUTH  DAILY AT BEDTIME 90 tablet 1    Multiple Vitamins-Minerals (CENTRUM SILVER PO) Take by mouth daily   omeprazole (PriLOSEC) 40 MG capsule TAKE 1 CAPSULE DAILY 90 capsule 1     No current facility-administered medications for this visit        Allergies   Allergen Reactions    Avelox [Moxifloxacin] Hives    Augmentin [Amoxicillin-Pot Clavulanate] Rash     Immunization History   Administered Date(s) Administered    Influenza Split High Dose Preservative Free IM 10/16/2012, 10/03/2013, 10/14/2014, 09/22/2015, 09/14/2016, 08/30/2017    Influenza TIV (IM) 09/01/2011    Influenza, high dose seasonal 0 5 mL 09/25/2018    Pneumococcal Conjugate 13-Valent 12/01/2016    Pneumococcal Polysaccharide PPV23 11/06/2013    TD (adult) Preservative Free 01/23/2019    Tuberculin Skin Test-PPD Intradermal 04/20/2004       Patient Care Team:  Nikos Guerrero MD as PCP - General  Omi Hightower MD    Medicare Screening Tests and Risk Assessments:  Izabella Molina is here for her Subsequent Wellness visit  Health Risk Assessment:  Patient rates overall health as very good  Patient feels that their physical health rating is Same  Eyesight was rated as Same  Hearing was rated as Slightly worse  Patient feels that their emotional and mental health rating is Same  Pain experienced by patient in the last 7 days has been None  Patient states that she has experienced no weight loss or gain in last 6 months  Emotional/Mental Health:  Patient has been feeling nervous/anxious  PHQ-9 Depression Screening:    Frequency of the following problems over the past two weeks:      1  Little interest or pleasure in doing things: 0 - not at all      2  Feeling down, depressed, or hopeless: 0 - not at all  PHQ-2 Score: 0          Broken Bones/Falls: Fall Risk Assessment:    In the past year, patient has experienced: No history of falling in past year          Bladder/Bowel:  Patient has not leaked urine accidently in the last six months  Patient reports no loss of bowel control  Immunizations:  Patient has had a flu vaccination within the last year  Patient has received a pneumonia shot  Patient has received a shingles shot  Patient has received tetanus/diphtheria shot  Home Safety:  Patient does not have trouble with stairs inside or outside of their home  Patient currently reports that there are no safety hazards present in home, working smoke alarms, working carbon monoxide detectors        Preventative Screenings:   Breast cancer screening performed, 5/9/2018  colon cancer screen completed, 7/17/2018  cholesterol screen completed, 7/9/2018  glaucoma eye exam completed, 11/1/2018      Nutrition:  Current diet: Regular with servings of the following:    Medications:  Patient is currently taking over-the-counter supplements  List of OTC medications includes: vitamin C, centrum silver, caltrate, zyrtec   Patient is able to manage medications  Lifestyle Choices:  Patient reports no tobacco use  Patient has smoked or used tobacco in the past   Patient has stopped her tobacco use  Tobacco use quit date: 25-30 years ago   Patient reports alcohol use  Alcohol use per week: wine daily   Patient drives a vehicle  Patient wears seat belt  Current level of exercise of physical activity described by patient as: moderate   Activities of Daily Living:  Can get out of bed by his or her self, able to dress self, able to make own meals, able to do own shopping, able to bathe self, can do own laundry/housekeeping, can manage own money, pay bills and track expenses    Previous Hospitalizations:  No hospitalization or ED visit in past 12 months        Advanced Directives:  Patient has decided on a power of   Patient has spoken to designated power of   Patient has completed advanced directive          Preventative Screening/Counseling:      Cardiovascular:      General: Risks and Benefits Discussed      Counseling: Healthy Diet, Healthy Weight, Improve Cholesterol, Improve Blood Pressure and Improve Exercise Tolerance     Due for Labs/Analytes/Optional EKG: Lipid Panel          Diabetes:      General: Risks and Benefits Discussed      Counseling: Healthy Diet, Improve Physical Activity and Healthy Weight      Due for labs: Blood Glucose          Colorectal Cancer:      General: Risks and Benefits Discussed and Screening Current          Breast Cancer:      General: Risks and Benefits Discussed and Screening Current          Cervical Cancer:      General: Risks and Benefits Discussed and Screening Current          Osteoporosis:      General: Risks and Benefits Discussed and Screening Current          AAA:      General: Screening Not Indicated          Glaucoma:      General: Risks and Benefits Discussed and Screening Current          HIV:      General: Screening Not Indicated          Hepatitis C:      General: Risks and Benefits Discussed        Advanced Directives:   Patient has living will for healthcare, has durable POA for healthcare, patient has an advanced directive  Immunizations:      Influenza: Risks & Benefits Discussed and Influenza UTD This Year      Pneumococcal: Risks & Benefits Discussed and Lifetime Vaccine Completed      Zostavax: Risks & Benefits Discussed      Other Preventative Counseling (Non-Medicare):   Fall Prevention, Increase physical activity and Nutrition Counseling

## 2019-01-24 DIAGNOSIS — R73.09 ELEVATED GLUCOSE LEVEL: Primary | ICD-10-CM

## 2019-01-24 LAB
EST. AVERAGE GLUCOSE BLD GHB EST-MCNC: 114 MG/DL
HBA1C MFR BLD: 5.6 % (ref 4.2–6.3)

## 2019-03-05 ENCOUNTER — ANESTHESIA EVENT (OUTPATIENT)
Dept: PERIOP | Facility: AMBULARY SURGERY CENTER | Age: 74
End: 2019-03-05
Payer: COMMERCIAL

## 2019-03-06 ENCOUNTER — OFFICE VISIT (OUTPATIENT)
Dept: LAB | Facility: CLINIC | Age: 74
End: 2019-03-06
Payer: COMMERCIAL

## 2019-03-06 DIAGNOSIS — Z01.818 ENCOUNTER FOR PREADMISSION TESTING: ICD-10-CM

## 2019-03-06 PROCEDURE — 93005 ELECTROCARDIOGRAM TRACING: CPT

## 2019-03-07 LAB
ATRIAL RATE: 85 BPM
P AXIS: -2 DEGREES
PR INTERVAL: 152 MS
QRS AXIS: 10 DEGREES
QRSD INTERVAL: 84 MS
QT INTERVAL: 368 MS
QTC INTERVAL: 437 MS
T WAVE AXIS: 26 DEGREES
VENTRICULAR RATE: 85 BPM

## 2019-03-07 PROCEDURE — 93010 ELECTROCARDIOGRAM REPORT: CPT | Performed by: INTERNAL MEDICINE

## 2019-03-07 RX ORDER — LORATADINE 10 MG/1
10 TABLET ORAL DAILY
COMMUNITY

## 2019-03-07 NOTE — PRE-PROCEDURE INSTRUCTIONS
Pre-Surgery Instructions:   Medication Instructions    ascorbic acid (VITAMIN C) 500 mg tablet Patient was instructed by Physician and understands   atorvastatin (LIPITOR) 10 mg tablet Instructed patient per Anesthesia Guidelines   Calcium Carbonate-Vit D-Min (CALTRATE 600+D PLUS MINERALS PO) Patient was instructed by Physician and understands   levothyroxine 100 mcg tablet Instructed patient per Anesthesia Guidelines   loratadine (CLARITIN) 10 mg tablet Instructed patient per Anesthesia Guidelines   mometasone (NASONEX) 50 mcg/act nasal spray Instructed patient per Anesthesia Guidelines   Multiple Vitamins-Minerals (CENTRUM SILVER PO) Patient was instructed by Physician and understands   omeprazole (PriLOSEC) 40 MG capsule Instructed patient per Anesthesia Guidelines  Pre op and bathing instructions reviewed   Pt will get hibiclens

## 2019-03-13 NOTE — H&P
Assessment/Plan:  Barbara Bo is a pleasant 72year old female who presents for treatment of a mid upper back lipoma  Please see HPI  She was referred to us by Dr Radha Smart  We previously have treated her for basal cell carcinoma of the nose with a bilopbed flap  I recommended excision of the mid upper back lipoma with complex closure  She understood and agreed  We discussed with the patient the options, benefits, and risks of surgery such as anesthesia, bleeding, infection, scarring and the need for additional procedures  Consent was obtained and all questions answered to her satisfaction  We will plan for surgery at her earliest convenience       Diagnoses and all orders for this visit:     Lipoma of back            Subjective:       Patient ID: Lacie Swan is a 72 y o  female      HPI   Barbara Bo is a pleasant 72year old female who presents for treatment of a mid upper back lipoma  She states that the lipoma has been present for approximately 3-4 years  It has been increasing in size       The following portions of the patient's history were reviewed and updated as appropriate: allergies, current medications, past family history, past medical history, past social history, past surgical history and problem list      Review of Systems   HENT: Negative for hearing loss  Eyes: Negative for visual disturbance  Respiratory: Negative for shortness of breath  Cardiovascular: Negative for chest pain  Gastrointestinal: Negative for abdominal pain, blood in stool, constipation, diarrhea, nausea and vomiting  Genitourinary: Negative for hematuria  Musculoskeletal: Negative for gait problem  Skin:        As per HPI  Neurological: Negative for seizures and headaches  Hematological: Does not bruise/bleed easily     Psychiatric/Behavioral: The patient is not nervous/anxious            Objective:        There were no vitals taken for this visit             Physical Exam   Constitutional: She is oriented to person, place, and time  She appears well-developed and well-nourished  No distress  HENT:   Head: Normocephalic and atraumatic  Eyes: Pupils are equal, round, and reactive to light  EOM are normal  No scleral icterus  Neck: Neck supple  No tracheal deviation present  No thyromegaly present  Cardiovascular: Normal rate and regular rhythm  Exam reveals no gallop and no friction rub  No murmur heard  Pulmonary/Chest: Effort normal and breath sounds normal  She has no wheezes  She has no rales  Abdominal: Soft  Bowel sounds are normal  She exhibits no distension  There is no tenderness  There is no rebound and no guarding  Musculoskeletal: Normal range of motion  Lymphadenopathy:     She has no cervical adenopathy  Neurological: She is alert and oriented to person, place, and time  No cranial nerve deficit  Skin:   Mid upper back lipoma noted  It measures 8cm x 8cm  Please see photos  Psychiatric: She has a normal mood and affect

## 2019-03-19 ENCOUNTER — ANESTHESIA (OUTPATIENT)
Dept: PERIOP | Facility: AMBULARY SURGERY CENTER | Age: 74
End: 2019-03-19
Payer: COMMERCIAL

## 2019-03-19 ENCOUNTER — HOSPITAL ENCOUNTER (OUTPATIENT)
Facility: AMBULARY SURGERY CENTER | Age: 74
Setting detail: OUTPATIENT SURGERY
Discharge: HOME | End: 2019-03-19
Attending: SURGERY | Admitting: SURGERY
Payer: COMMERCIAL

## 2019-03-19 VITALS
SYSTOLIC BLOOD PRESSURE: 126 MMHG | BODY MASS INDEX: 26.93 KG/M2 | DIASTOLIC BLOOD PRESSURE: 80 MMHG | HEART RATE: 80 BPM | RESPIRATION RATE: 16 BRPM | WEIGHT: 152 LBS | TEMPERATURE: 98 F | HEIGHT: 63 IN | OXYGEN SATURATION: 96 %

## 2019-03-19 DIAGNOSIS — D17.1 LIPOMA OF BACK: ICD-10-CM

## 2019-03-19 PROCEDURE — 21933 EXC BACK TUM DEEP 5 CM/>: CPT | Performed by: SURGERY

## 2019-03-19 PROCEDURE — 88304 TISSUE EXAM BY PATHOLOGIST: CPT | Performed by: PATHOLOGY

## 2019-03-19 RX ORDER — DEXAMETHASONE SODIUM PHOSPHATE 10 MG/ML
INJECTION, SOLUTION INTRAMUSCULAR; INTRAVENOUS AS NEEDED
Status: DISCONTINUED | OUTPATIENT
Start: 2019-03-19 | End: 2019-03-19 | Stop reason: SURG

## 2019-03-19 RX ORDER — SODIUM CHLORIDE 9 MG/ML
125 INJECTION, SOLUTION INTRAVENOUS CONTINUOUS
Status: DISCONTINUED | OUTPATIENT
Start: 2019-03-19 | End: 2019-03-19 | Stop reason: HOSPADM

## 2019-03-19 RX ORDER — SODIUM CHLORIDE, SODIUM LACTATE, POTASSIUM CHLORIDE, CALCIUM CHLORIDE 600; 310; 30; 20 MG/100ML; MG/100ML; MG/100ML; MG/100ML
125 INJECTION, SOLUTION INTRAVENOUS CONTINUOUS
Status: DISCONTINUED | OUTPATIENT
Start: 2019-03-19 | End: 2019-03-19 | Stop reason: HOSPADM

## 2019-03-19 RX ORDER — FENTANYL CITRATE/PF 50 MCG/ML
25 SYRINGE (ML) INJECTION
Status: DISCONTINUED | OUTPATIENT
Start: 2019-03-19 | End: 2019-03-19 | Stop reason: HOSPADM

## 2019-03-19 RX ORDER — PROPOFOL 10 MG/ML
INJECTION, EMULSION INTRAVENOUS AS NEEDED
Status: DISCONTINUED | OUTPATIENT
Start: 2019-03-19 | End: 2019-03-19 | Stop reason: SURG

## 2019-03-19 RX ORDER — ONDANSETRON 2 MG/ML
INJECTION INTRAMUSCULAR; INTRAVENOUS AS NEEDED
Status: DISCONTINUED | OUTPATIENT
Start: 2019-03-19 | End: 2019-03-19 | Stop reason: SURG

## 2019-03-19 RX ORDER — LIDOCAINE HYDROCHLORIDE 10 MG/ML
INJECTION, SOLUTION INFILTRATION; PERINEURAL AS NEEDED
Status: DISCONTINUED | OUTPATIENT
Start: 2019-03-19 | End: 2019-03-19 | Stop reason: SURG

## 2019-03-19 RX ORDER — FENTANYL CITRATE 50 UG/ML
INJECTION, SOLUTION INTRAMUSCULAR; INTRAVENOUS AS NEEDED
Status: DISCONTINUED | OUTPATIENT
Start: 2019-03-19 | End: 2019-03-19 | Stop reason: SURG

## 2019-03-19 RX ORDER — LIDOCAINE HYDROCHLORIDE AND EPINEPHRINE 10; 10 MG/ML; UG/ML
INJECTION, SOLUTION INFILTRATION; PERINEURAL AS NEEDED
Status: DISCONTINUED | OUTPATIENT
Start: 2019-03-19 | End: 2019-03-19 | Stop reason: HOSPADM

## 2019-03-19 RX ORDER — BUPIVACAINE HYDROCHLORIDE 2.5 MG/ML
INJECTION, SOLUTION INFILTRATION; PERINEURAL AS NEEDED
Status: DISCONTINUED | OUTPATIENT
Start: 2019-03-19 | End: 2019-03-19 | Stop reason: HOSPADM

## 2019-03-19 RX ORDER — ACETAMINOPHEN 325 MG/1
650 TABLET ORAL EVERY 6 HOURS
Status: DISCONTINUED | OUTPATIENT
Start: 2019-03-19 | End: 2019-03-19 | Stop reason: HOSPADM

## 2019-03-19 RX ORDER — ACETAMINOPHEN AND CODEINE PHOSPHATE 300; 30 MG/1; MG/1
1 TABLET ORAL EVERY 4 HOURS PRN
Qty: 6 TABLET | Refills: 0 | Status: SHIPPED | OUTPATIENT
Start: 2019-03-19 | End: 2020-08-10 | Stop reason: ALTCHOICE

## 2019-03-19 RX ORDER — CLINDAMYCIN PHOSPHATE 900 MG/50ML
900 INJECTION INTRAVENOUS ONCE
Status: COMPLETED | OUTPATIENT
Start: 2019-03-19 | End: 2019-03-19

## 2019-03-19 RX ADMIN — PROPOFOL 170 MG: 10 INJECTION, EMULSION INTRAVENOUS at 12:50

## 2019-03-19 RX ADMIN — LIDOCAINE HYDROCHLORIDE ANHYDROUS 50 MG: 10 INJECTION, SOLUTION INFILTRATION at 12:50

## 2019-03-19 RX ADMIN — DEXAMETHASONE SODIUM PHOSPHATE 4 MG: 10 INJECTION, SOLUTION INTRAMUSCULAR; INTRAVENOUS at 13:07

## 2019-03-19 RX ADMIN — CLINDAMYCIN PHOSPHATE 900 MG: 18 INJECTION, SOLUTION INTRAMUSCULAR; INTRAVENOUS at 12:55

## 2019-03-19 RX ADMIN — ONDANSETRON 4 MG: 2 INJECTION INTRAMUSCULAR; INTRAVENOUS at 13:07

## 2019-03-19 RX ADMIN — FENTANYL CITRATE 50 MCG: 50 INJECTION, SOLUTION INTRAMUSCULAR; INTRAVENOUS at 13:05

## 2019-03-19 RX ADMIN — FENTANYL CITRATE 50 MCG: 50 INJECTION, SOLUTION INTRAMUSCULAR; INTRAVENOUS at 13:33

## 2019-03-19 RX ADMIN — SODIUM CHLORIDE: 0.9 INJECTION, SOLUTION INTRAVENOUS at 12:21

## 2019-03-19 NOTE — OP NOTE
OPERATIVE REPORT  PATIENT NAME: Cori Sue    :  1945  MRN: [id number]  Pt Location: [facility]    SURGERY DATE: 3/19/2019    Surgeon(s) and Role:     Karina Parada MD - Primary    Preop Diagnosis:  Lipoma of back [D17 1]    Post-Op Diagnosis Codes:     * Lipoma of back [D17 1]     Procedure:  Excision/resection submuscular lipoma of back 5 5 cm, 6 5 cm intermediate, layered closure of back  Specimen(s):  ID Type Source Tests Collected by Time Destination   1 : lipoma of back  Tissue Soft Tissue, Lipoma TISSUE EXAM Marylin Yeboah MD 3/19/2019 1307        Estimated Blood Loss:   Minimal    Drains:  * No LDAs found *    Anesthesia Type:   Choice    Operative Indications:  Lipoma of back [D17 1]  Submuscular lipoma    Operative Findings:  As above    Complications:   None    Procedure and Technique:  Lee Ann Carter was seen in the holding area preoperatively, the surgical site was marked with her participation  She was taken to the operating room and underwent induction of anesthesia by the anesthesia personnel  The operative field was prepped and draped in sterile fashion a proper time-out was performed  The area was infiltrated with xylocaine with epinephrine the usual fashion for excision/resection of a submuscular lipoma  The 15 blade was then used to create the skin incision is carried down through the dermis the Bovie cautery was used to dissect through subcutaneous tissue down to the superficial fascia  The fascia was opened utilizing the Bovie cautery, and dissection proceeded to the deep fascia overlying the trapezius muscle  The mass was submuscular, deep to the trapezius, the trick trapezius was opened parallel to the direction of its fibers, and the mass was extracted from its location deep to the trapezius  The wound was thoroughly irrigated and hemostasis assured the Bovie cautery    Closure was then undertaken in multiple layers, the dead space was closed with 3-0 PDS sutures, this was followed by multiple 3-0 PDS sutures buried at the level of the deep dermis  Following this, 1/4 % Marcaine was instilled into the wound  The remainder of the closure consisted of a running subcuticular strata fix  Benzoin Steri-Strips and a light pressure dressing were then applied    The patient was transferred to the recovery   I was present for the entire procedure    Patient Disposition:  PACU     SIGNATURE: Henok Farmer MD  DATE: March 19, 2019  TIME: 2:27 PM

## 2019-03-19 NOTE — ANESTHESIA PREPROCEDURE EVALUATION
Review of Systems/Medical History  Patient summary reviewed  Chart reviewed  No history of anesthetic complications     Cardiovascular  Exercise tolerance (METS): >4,  Hyperlipidemia, Hypertension (no meds) ,    Pulmonary  Negative pulmonary ROS        GI/Hepatic    GERD (took omeprazole today) well controlled,        Negative  ROS        Endo/Other  History of thyroid disease , hypothyroidism,      GYN  Negative gynecology ROS          Hematology  Negative hematology ROS      Musculoskeletal  Negative musculoskeletal ROS        Neurology  Negative neurology ROS      Psychology   Negative psychology ROS            Physical Exam    Airway    Mallampati score: I  TM Distance: >3 FB  Neck ROM: full     Dental   No notable dental hx     Cardiovascular      Pulmonary      Other Findings       Lab Results   Component Value Date    WBC 4 64 01/23/2019    HGB 13 7 01/23/2019     01/23/2019     Lab Results   Component Value Date     01/23/2019    K 5 0 01/23/2019    BUN 19 01/23/2019    CREATININE 0 69 01/23/2019    GLUCOSE 114 01/23/2019     Lab Results   Component Value Date    HGBA1C 5 6 01/23/2019     Anesthesia Plan  ASA Score- 2     Anesthesia Type- general with ASA Monitors  Additional Monitors:   Airway Plan: LMA  Comment: Large lipoma center of back - too big for IV sedation  Plan Factors-    Induction- intravenous  Postoperative Plan-     Informed Consent- Anesthetic plan and risks discussed with patient and spouse  I personally reviewed this patient with the CRNA  Discussed and agreed on the Anesthesia Plan with the CRNA  Coni Florian

## 2019-03-19 NOTE — DISCHARGE INSTRUCTIONS
[facility]  Dr Yahaira Campbell   [address] [facility] [address], [address]  Phone: [phone]     Postoperative Instructions for Outpatient Surgery     These instructions are being provided by your doctor to give you basic guidelines during your post-op recovery  Please let our office know if your contact information has changed       Please call the office today for an appointment in 2 weeks for postoperative care      Dressings: Remove outer dressing in 48 hours  Leave steri strips on  Replace if they fall off      Activity Restrictions: Nothing strenuous for 1 week       Bathing:  May shower after dressing removal       Medications:    Resume pre-op medications  You may take tylenol, aleve, or ibuprofen for pain control             Tylenol #3 as needed for pain  Other: May apply ice to area at 15 minute intervals as needed for pain or swelling

## 2019-03-19 NOTE — INTERIM OP NOTE
MID UPPER BACK LIPOMA EXCISION, COMPLEX CLOSURE  Postoperative Note  PATIENT NAME: Danay Davila  : 1945  MRN: [id number]  [facility]    Surgery Date: 3/19/2019    Preop Diagnosis:  Lipoma of back [D17 1]    Post-Op Diagnosis Codes:     * Lipoma of back [D17 1]    Procedure(s) (LRB):  MID UPPER BACK LIPOMA EXCISION (N/A)  COMPLEX CLOSURE (N/A)    Surgeon(s) and Role:     * Kimberly Scott MD - Primary    Specimens:  ID Type Source Tests Collected by Time Destination   1 : lipoma of back  Tissue Soft Tissue, Lipoma TISSUE EXAM Kimberly Scott MD 3/19/2019 1307        Estimated Blood Loss:   Minimal    Anesthesia Type:   Choice     Findings:    None  Complications:   None    SIGNATURE: Adore Waite PA-C   DATE: 2019   TIME: 1:34 PM

## 2019-03-19 NOTE — ANESTHESIA POSTPROCEDURE EVALUATION
Post-Op Assessment Note    CV Status:  Stable    Pain management: adequate     Mental Status:  Alert and awake   Hydration Status:  Euvolemic   PONV Controlled:  Controlled   Airway Patency:  Patent   Post Op Vitals Reviewed: Yes      Staff: CRNA           BP   131/60   Temp   98 0   Pulse 86   Resp 16   SpO2 97

## 2019-04-01 ENCOUNTER — OFFICE VISIT (OUTPATIENT)
Dept: PLASTIC SURGERY | Facility: CLINIC | Age: 74
End: 2019-04-01

## 2019-04-01 DIAGNOSIS — D17.1 LIPOMA OF BACK: Primary | ICD-10-CM

## 2019-04-01 PROCEDURE — 99024 POSTOP FOLLOW-UP VISIT: CPT | Performed by: PHYSICIAN ASSISTANT

## 2019-04-03 ENCOUNTER — TELEPHONE (OUTPATIENT)
Dept: OBGYN CLINIC | Facility: CLINIC | Age: 74
End: 2019-04-03

## 2019-04-05 DIAGNOSIS — J30.9 CHRONIC ALLERGIC RHINITIS: ICD-10-CM

## 2019-04-05 DIAGNOSIS — K21.9 GASTROESOPHAGEAL REFLUX DISEASE, ESOPHAGITIS PRESENCE NOT SPECIFIED: ICD-10-CM

## 2019-04-05 RX ORDER — MOMETASONE FUROATE 50 UG/1
SPRAY, METERED NASAL
Qty: 51 G | Refills: 1 | Status: SHIPPED | OUTPATIENT
Start: 2019-04-05 | End: 2019-10-13 | Stop reason: SDUPTHER

## 2019-04-05 RX ORDER — OMEPRAZOLE 40 MG/1
CAPSULE, DELAYED RELEASE ORAL
Qty: 90 CAPSULE | Refills: 1 | Status: SHIPPED | OUTPATIENT
Start: 2019-04-05 | End: 2019-09-24 | Stop reason: SDUPTHER

## 2019-04-19 NOTE — TELEPHONE ENCOUNTER
She has her usual sinus infect symp    Wants Am[facility] called in to [facility] [address]  Has headache, severe head congestion  Dry cough  PND  No fever  Symptoms are all in her head  Will ck with pharm if no cb
No

## 2019-04-29 ENCOUNTER — OFFICE VISIT (OUTPATIENT)
Dept: PLASTIC SURGERY | Facility: CLINIC | Age: 74
End: 2019-04-29

## 2019-04-29 DIAGNOSIS — D17.1 LIPOMA OF BACK: Primary | ICD-10-CM

## 2019-04-29 PROCEDURE — 99024 POSTOP FOLLOW-UP VISIT: CPT | Performed by: PHYSICIAN ASSISTANT

## 2019-05-14 ENCOUNTER — HOSPITAL ENCOUNTER (OUTPATIENT)
Dept: RADIOLOGY | Age: 74
Discharge: HOME | End: 2019-05-14
Payer: COMMERCIAL

## 2019-05-14 VITALS — BODY MASS INDEX: 27.46 KG/M2 | HEIGHT: 63 IN | WEIGHT: 155 LBS

## 2019-05-14 DIAGNOSIS — Z12.31 ENCOUNTER FOR SCREENING MAMMOGRAM FOR MALIGNANT NEOPLASM OF BREAST: ICD-10-CM

## 2019-05-14 PROCEDURE — 77067 SCR MAMMO BI INCL CAD: CPT

## 2019-05-14 PROCEDURE — 77063 BREAST TOMOSYNTHESIS BI: CPT

## 2019-06-11 ENCOUNTER — TELEPHONE (OUTPATIENT)
Dept: PLASTIC SURGERY | Facility: CLINIC | Age: 74
End: 2019-06-11

## 2019-06-11 ENCOUNTER — TELEPHONE (OUTPATIENT)
Dept: FAMILY MEDICINE CLINIC | Facility: CLINIC | Age: 74
End: 2019-06-11

## 2019-06-12 ENCOUNTER — APPOINTMENT (OUTPATIENT)
Dept: LAB | Facility: CLINIC | Age: 74
End: 2019-06-12
Payer: COMMERCIAL

## 2019-06-12 DIAGNOSIS — E03.9 HYPOTHYROIDISM, UNSPECIFIED TYPE: ICD-10-CM

## 2019-06-12 DIAGNOSIS — E03.9 HYPOTHYROIDISM, UNSPECIFIED TYPE: Primary | ICD-10-CM

## 2019-06-12 LAB — TSH SERPL DL<=0.05 MIU/L-ACNC: 0.34 UIU/ML (ref 0.36–3.74)

## 2019-06-12 PROCEDURE — 84443 ASSAY THYROID STIM HORMONE: CPT

## 2019-06-12 PROCEDURE — 36415 COLL VENOUS BLD VENIPUNCTURE: CPT

## 2019-06-21 DIAGNOSIS — E78.2 MIXED HYPERLIPIDEMIA: ICD-10-CM

## 2019-06-21 RX ORDER — ATORVASTATIN CALCIUM 10 MG/1
TABLET, FILM COATED ORAL
Qty: 90 TABLET | Refills: 1 | Status: SHIPPED | OUTPATIENT
Start: 2019-06-21 | End: 2019-11-15 | Stop reason: SDUPTHER

## 2019-07-18 DIAGNOSIS — E03.9 HYPOTHYROIDISM, UNSPECIFIED TYPE: ICD-10-CM

## 2019-07-19 RX ORDER — LEVOTHYROXINE SODIUM 0.1 MG/1
TABLET ORAL
Qty: 90 TABLET | Refills: 1 | Status: SHIPPED | OUTPATIENT
Start: 2019-07-19 | End: 2019-10-25

## 2019-07-24 ENCOUNTER — OFFICE VISIT (OUTPATIENT)
Dept: FAMILY MEDICINE CLINIC | Facility: CLINIC | Age: 74
End: 2019-07-24
Payer: COMMERCIAL

## 2019-07-24 VITALS
SYSTOLIC BLOOD PRESSURE: 128 MMHG | BODY MASS INDEX: 28.35 KG/M2 | TEMPERATURE: 97.9 F | WEIGHT: 160 LBS | HEIGHT: 63 IN | DIASTOLIC BLOOD PRESSURE: 82 MMHG | HEART RATE: 76 BPM

## 2019-07-24 DIAGNOSIS — M25.511 ACUTE PAIN OF RIGHT SHOULDER: ICD-10-CM

## 2019-07-24 DIAGNOSIS — E03.8 HYPOTHYROIDISM DUE TO HASHIMOTO'S THYROIDITIS: Primary | ICD-10-CM

## 2019-07-24 DIAGNOSIS — E04.1 NONTOXIC SINGLE THYROID NODULE: ICD-10-CM

## 2019-07-24 DIAGNOSIS — K21.9 GASTROESOPHAGEAL REFLUX DISEASE, ESOPHAGITIS PRESENCE NOT SPECIFIED: ICD-10-CM

## 2019-07-24 DIAGNOSIS — E06.3 HYPOTHYROIDISM DUE TO HASHIMOTO'S THYROIDITIS: Primary | ICD-10-CM

## 2019-07-24 DIAGNOSIS — E78.2 MIXED HYPERLIPIDEMIA: ICD-10-CM

## 2019-07-24 PROCEDURE — 99214 OFFICE O/P EST MOD 30 MIN: CPT | Performed by: FAMILY MEDICINE

## 2019-07-24 PROCEDURE — 1036F TOBACCO NON-USER: CPT | Performed by: FAMILY MEDICINE

## 2019-07-24 PROCEDURE — 1160F RVW MEDS BY RX/DR IN RCRD: CPT | Performed by: FAMILY MEDICINE

## 2019-07-24 PROCEDURE — 3008F BODY MASS INDEX DOCD: CPT | Performed by: FAMILY MEDICINE

## 2019-07-24 NOTE — PROGRESS NOTES
Assessment/Plan:    Esophageal reflux  Failed attempt to wean of PPI by our office and by GI  Cont present meds  Urged GERD diet compliance  Recheck 6m    Hypothyroidism  Appears to be stable clinically  Check TSH  Adjust meds if TSH is not at goal  Recheck 6m      Nontoxic single thyroid nodule  Repeat thyroid U/S  Hyperlipidemia  Cont atorvastatin  Cont to monitor  Recheck 6m       Diagnoses and all orders for this visit:    Hypothyroidism due to Hashimoto's thyroiditis  -     TSH, 3rd generation; Future    Mixed hyperlipidemia  -     Comprehensive metabolic panel; Future  -     Lipid panel; Future    Gastroesophageal reflux disease, esophagitis presence not specified    Nontoxic single thyroid nodule  -     US thyroid; Future  -     TSH, 3rd generation; Future    Acute pain of right shoulder  Comments:  Reviewed with pt  Good ROM  Cont to monitor  Recheck 1-2m if not resolved - earlier if worse  Subjective:      Patient ID: Jeff Dobbins is a 69 y o  female  f/u multiple med issues   - pt feels well  - not exercising regularly  Pt denies CP, palp, lightheadedness or other CV symptoms with or without exertion  - no GI issues  Up to date with colonoscopy  - pt had lipoma removed with Dr Hola Estrada  Had brief episode of hair loss after anesthesia but it improving now  - has some R arm/shoulder pain off anon over the last few months  Better with change in position  - no other concerns      The following portions of the patient's history were reviewed and updated as appropriate: She  has a past medical history of Acid reflux, Basal cell carcinoma, Dental bridge present, Disease of thyroid gland, Hashimoto's disease, High cholesterol, Osteopenia, Seasonal allergies, Thyroid disease, and Wears glasses    She   Patient Active Problem List    Diagnosis Date Noted    Lipoma of back 12/18/2018    White coat syndrome with high blood pressure without hypertension 04/12/2017    Basal cell carcinoma of skin of nose 08/16/2016    Esophageal reflux 09/26/2012    Hyperlipidemia 09/26/2012    Hypothyroidism 09/26/2012    Nontoxic single thyroid nodule 09/26/2012     She  has a past surgical history that includes Skin cancer excision; Tonsillectomy; Esophagogastroduodenoscopy; Colonoscopy; Skin biopsy; MOHS RECONSTRUCTION (N/A, 10/4/2016); pr exc skin benig >4 cm trunk,arm,leg (N/A, 3/19/2019); and pr recmpl wnd trunk 2 6-7 5 cm (N/A, 3/19/2019)  She  reports that she has quit smoking  She has a 20 00 pack-year smoking history  She has never used smokeless tobacco  She reports that she drinks alcohol  She reports that she does not use drugs  Current Outpatient Medications   Medication Sig Dispense Refill    acetaminophen-codeine (TYLENOL #3) 300-30 mg per tablet Take 1 tablet by mouth every 4 (four) hours as needed for moderate pain for up to 6 doses 6 tablet 0    ascorbic acid (VITAMIN C) 500 mg tablet Take 500 mg by mouth daily   atorvastatin (LIPITOR) 10 mg tablet TAKE 1 TABLET BY MOUTH  DAILY 90 tablet 1    Calcium Carbonate-Vit D-Min (CALTRATE 600+D PLUS MINERALS PO) Take by mouth daily   levothyroxine 100 mcg tablet TAKE 1 TABLET BY MOUTH  DAILY 90 tablet 1    loratadine (CLARITIN) 10 mg tablet Take 10 mg by mouth daily      mometasone (NASONEX) 50 mcg/act nasal spray USE 2 SPRAYS IN EACH  NOSTRIL DAILY 51 g 1    Multiple Vitamins-Minerals (CENTRUM SILVER PO) Take by mouth daily   omeprazole (PriLOSEC) 40 MG capsule TAKE 1 CAPSULE BY MOUTH  DAILY 90 capsule 1     No current facility-administered medications for this visit  She is allergic to avelox [moxifloxacin] and augmentin [amoxicillin-pot clavulanate]       Review of Systems   Constitutional: Negative  HENT: Negative  Eyes: Negative  Respiratory: Negative  Cardiovascular: Negative  Gastrointestinal: Negative  Endocrine: Negative  Genitourinary: Negative      Musculoskeletal: Positive for arthralgias (R shoulder)  Allergic/Immunologic: Negative  Neurological: Negative  Hematological: Negative  Psychiatric/Behavioral: Negative  Objective:      /82 (BP Location: Left arm, Patient Position: Sitting, Cuff Size: Standard)   Pulse 76   Temp 97 9 °F (36 6 °C)   Ht 5' 3" (1 6 m)   Wt 72 6 kg (160 lb)   BMI 28 34 kg/m²          Physical Exam   Constitutional: She is oriented to person, place, and time  She appears well-developed and well-nourished  HENT:   Head: Normocephalic and atraumatic  Right Ear: External ear normal    Left Ear: External ear normal    Nose: Nose normal    Mouth/Throat: Oropharynx is clear and moist  No oropharyngeal exudate  Eyes: Pupils are equal, round, and reactive to light  Conjunctivae and EOM are normal    Neck: Normal range of motion  Neck supple  No JVD present  Thyromegaly (thyroid nodule - unchanged) present  Cardiovascular: Normal rate, regular rhythm and intact distal pulses  No murmur heard  Pulmonary/Chest: Effort normal and breath sounds normal    Abdominal: Soft  She exhibits no distension and no mass  There is no tenderness  Musculoskeletal: Normal range of motion  Lymphadenopathy:     She has no cervical adenopathy  Neurological: She is alert and oriented to person, place, and time  She has normal reflexes  No cranial nerve deficit  She exhibits normal muscle tone  Coordination normal    Skin: Skin is warm  She is not diaphoretic  Psychiatric: She has a normal mood and affect  Her behavior is normal  Judgment and thought content normal    Vitals reviewed

## 2019-07-25 NOTE — ASSESSMENT & PLAN NOTE
Failed attempt to wean of PPI by our office and by GI  Cont present meds  Urged GERD diet compliance   Recheck 6m

## 2019-08-14 ENCOUNTER — HOSPITAL ENCOUNTER (OUTPATIENT)
Dept: ULTRASOUND IMAGING | Facility: HOSPITAL | Age: 74
Discharge: HOME | End: 2019-08-14
Payer: COMMERCIAL

## 2019-08-14 DIAGNOSIS — E04.1 NONTOXIC SINGLE THYROID NODULE: ICD-10-CM

## 2019-08-14 PROCEDURE — 76536 US EXAM OF HEAD AND NECK: CPT

## 2019-09-24 DIAGNOSIS — K21.9 GASTROESOPHAGEAL REFLUX DISEASE, ESOPHAGITIS PRESENCE NOT SPECIFIED: ICD-10-CM

## 2019-09-24 RX ORDER — OMEPRAZOLE 40 MG/1
CAPSULE, DELAYED RELEASE ORAL
Qty: 90 CAPSULE | Refills: 1 | Status: SHIPPED | OUTPATIENT
Start: 2019-09-24 | End: 2020-03-05

## 2019-09-26 ENCOUNTER — IMMUNIZATIONS (OUTPATIENT)
Dept: FAMILY MEDICINE CLINIC | Facility: CLINIC | Age: 74
End: 2019-09-26
Payer: COMMERCIAL

## 2019-09-26 DIAGNOSIS — Z23 ENCOUNTER FOR IMMUNIZATION: ICD-10-CM

## 2019-09-26 PROCEDURE — G0008 ADMIN INFLUENZA VIRUS VAC: HCPCS | Performed by: FAMILY MEDICINE

## 2019-09-26 PROCEDURE — 90662 IIV NO PRSV INCREASED AG IM: CPT | Performed by: FAMILY MEDICINE

## 2019-10-11 ENCOUNTER — OFFICE VISIT (OUTPATIENT)
Dept: PLASTIC SURGERY | Facility: CLINIC | Age: 74
End: 2019-10-11
Payer: COMMERCIAL

## 2019-10-11 DIAGNOSIS — R22.2 LUMP OF SKIN OF BACK: Primary | ICD-10-CM

## 2019-10-11 PROCEDURE — 99212 OFFICE O/P EST SF 10 MIN: CPT | Performed by: PHYSICIAN ASSISTANT

## 2019-10-11 NOTE — PROGRESS NOTES
Assessment/Plan:   Beth Ortiz is a 79-year-old female who presents with concerns of a bump on her back  Please see HPI  This appears to be some mild excess skin noted from a previous excision  I do not appreciate any lipoma  I did discuss with her possible scar revision however, she is not interested  She was happy to hear this  She will follow up on an as-needed basis  Diagnoses and all orders for this visit:    Lump of skin of back          Subjective:     Patient ID: Sparkle Reilly is a 69 y o  female  HPI   She reports a bump on her right shoulder blade  She has concerns that it could be another lipoma  She denies any pain associated with it  Review of Systems   Skin:        As per HPI  Objective:     Physical Exam   Skin:   Right upper back excess tissue noted at the inferior edge of a previous scar  This is very mild  I do not appreciate any lipoma

## 2019-10-13 DIAGNOSIS — J30.9 CHRONIC ALLERGIC RHINITIS: ICD-10-CM

## 2019-10-14 RX ORDER — MOMETASONE FUROATE 50 UG/1
SPRAY, METERED NASAL
Qty: 51 G | Refills: 1 | Status: SHIPPED | OUTPATIENT
Start: 2019-10-14 | End: 2020-03-05

## 2019-10-24 ENCOUNTER — APPOINTMENT (OUTPATIENT)
Dept: LAB | Facility: CLINIC | Age: 74
End: 2019-10-24
Payer: COMMERCIAL

## 2019-10-24 DIAGNOSIS — E03.8 HYPOTHYROIDISM DUE TO HASHIMOTO'S THYROIDITIS: ICD-10-CM

## 2019-10-24 DIAGNOSIS — E78.2 MIXED HYPERLIPIDEMIA: ICD-10-CM

## 2019-10-24 DIAGNOSIS — E06.3 HYPOTHYROIDISM DUE TO HASHIMOTO'S THYROIDITIS: ICD-10-CM

## 2019-10-24 DIAGNOSIS — E04.1 NONTOXIC SINGLE THYROID NODULE: ICD-10-CM

## 2019-10-24 LAB
ALBUMIN SERPL BCP-MCNC: 3.5 G/DL (ref 3.5–5)
ALP SERPL-CCNC: 62 U/L (ref 46–116)
ALT SERPL W P-5'-P-CCNC: 24 U/L (ref 12–78)
ANION GAP SERPL CALCULATED.3IONS-SCNC: 6 MMOL/L (ref 4–13)
AST SERPL W P-5'-P-CCNC: 20 U/L (ref 5–45)
BILIRUB SERPL-MCNC: 0.7 MG/DL (ref 0.2–1)
BUN SERPL-MCNC: 17 MG/DL (ref 5–25)
CALCIUM SERPL-MCNC: 9.2 MG/DL (ref 8.3–10.1)
CHLORIDE SERPL-SCNC: 105 MMOL/L (ref 100–108)
CHOLEST SERPL-MCNC: 215 MG/DL (ref 50–200)
CO2 SERPL-SCNC: 27 MMOL/L (ref 21–32)
CREAT SERPL-MCNC: 0.56 MG/DL (ref 0.6–1.3)
GFR SERPL CREATININE-BSD FRML MDRD: 93 ML/MIN/1.73SQ M
GLUCOSE P FAST SERPL-MCNC: 96 MG/DL (ref 65–99)
HDLC SERPL-MCNC: 81 MG/DL
LDLC SERPL CALC-MCNC: 117 MG/DL (ref 0–100)
NONHDLC SERPL-MCNC: 134 MG/DL
POTASSIUM SERPL-SCNC: 3.9 MMOL/L (ref 3.5–5.3)
PROT SERPL-MCNC: 7 G/DL (ref 6.4–8.2)
SODIUM SERPL-SCNC: 138 MMOL/L (ref 136–145)
TRIGL SERPL-MCNC: 84 MG/DL
TSH SERPL DL<=0.05 MIU/L-ACNC: 0.27 UIU/ML (ref 0.36–3.74)

## 2019-10-24 PROCEDURE — 80053 COMPREHEN METABOLIC PANEL: CPT

## 2019-10-24 PROCEDURE — 84443 ASSAY THYROID STIM HORMONE: CPT

## 2019-10-24 PROCEDURE — 80061 LIPID PANEL: CPT

## 2019-10-24 PROCEDURE — 36415 COLL VENOUS BLD VENIPUNCTURE: CPT

## 2019-10-25 DIAGNOSIS — E03.9 HYPOTHYROIDISM, UNSPECIFIED TYPE: ICD-10-CM

## 2019-10-25 RX ORDER — LEVOTHYROXINE SODIUM 88 UG/1
88 TABLET ORAL DAILY
Qty: 30 TABLET | Refills: 5 | Status: SHIPPED | OUTPATIENT
Start: 2019-10-25 | End: 2020-03-09 | Stop reason: SDUPTHER

## 2019-11-15 DIAGNOSIS — E78.2 MIXED HYPERLIPIDEMIA: ICD-10-CM

## 2019-11-17 RX ORDER — ATORVASTATIN CALCIUM 10 MG/1
TABLET, FILM COATED ORAL
Qty: 90 TABLET | Refills: 1 | Status: SHIPPED | OUTPATIENT
Start: 2019-11-17 | End: 2020-04-20

## 2019-11-18 ENCOUNTER — TELEPHONE (OUTPATIENT)
Dept: FAMILY MEDICINE CLINIC | Facility: CLINIC | Age: 74
End: 2019-11-18

## 2019-11-18 DIAGNOSIS — J01.10 ACUTE NON-RECURRENT FRONTAL SINUSITIS: Primary | ICD-10-CM

## 2019-11-18 RX ORDER — AZITHROMYCIN 250 MG/1
TABLET, FILM COATED ORAL
Qty: 6 TABLET | Refills: 0 | Status: SHIPPED | OUTPATIENT
Start: 2019-11-18 | End: 2019-11-22

## 2019-11-18 NOTE — TELEPHONE ENCOUNTER
Patient called stating for the last week and a 1/2 the patient has terrible head congestion, eye hurt, ears clogged, chills, energy level is very low  No fever  No nausea, No diarrhea, no vomiting and No rash  Patient has been taking Sudafed but it is not helping   Would like something called in    [place] [id number] [facility] [address] to check with pharmacy

## 2019-11-26 ENCOUNTER — OFFICE VISIT (OUTPATIENT)
Dept: FAMILY MEDICINE CLINIC | Facility: CLINIC | Age: 74
End: 2019-11-26
Payer: COMMERCIAL

## 2019-11-26 VITALS
DIASTOLIC BLOOD PRESSURE: 80 MMHG | OXYGEN SATURATION: 98 % | HEART RATE: 101 BPM | WEIGHT: 164.4 LBS | HEIGHT: 63 IN | SYSTOLIC BLOOD PRESSURE: 116 MMHG | TEMPERATURE: 98.3 F | RESPIRATION RATE: 16 BRPM | BODY MASS INDEX: 29.13 KG/M2

## 2019-11-26 DIAGNOSIS — J01.10 ACUTE NON-RECURRENT FRONTAL SINUSITIS: Primary | ICD-10-CM

## 2019-11-26 PROCEDURE — 99213 OFFICE O/P EST LOW 20 MIN: CPT | Performed by: FAMILY MEDICINE

## 2019-11-26 PROCEDURE — 1160F RVW MEDS BY RX/DR IN RCRD: CPT | Performed by: FAMILY MEDICINE

## 2019-11-26 RX ORDER — GLUCOSAMINE/D3/BOSWELLIA SERRA 1500MG-400
TABLET ORAL
COMMUNITY

## 2019-11-26 RX ORDER — AMOXICILLIN 500 MG/1
1000 CAPSULE ORAL EVERY 12 HOURS SCHEDULED
Qty: 40 CAPSULE | Refills: 0 | Status: SHIPPED | OUTPATIENT
Start: 2019-11-26 | End: 2019-12-06

## 2019-11-26 NOTE — PROGRESS NOTES
Valentin Mortimer 1945 female MRN: [id number]    Acute Visit    Assessment/Plan   Kate Madera was seen today for facial pain  Diagnoses and all orders for this visit:    Acute non-recurrent frontal sinusitis  -     amoxicillin (AMOXIL) 500 mg capsule; Take 2 capsules (1,000 mg total) by mouth every 12 (twelve) hours for 10 days      Dell Lagos MD  [address] [place]  11/26/2019      Please be aware that this note contains text that was dictated and there may be errors pertaining to "sound-alike "words during the dictation process  SUBJECTIVE    CC: Facial Pain (Sinus pain)    HPI:  Valentin Mortimer is a 69 y o  female who presented for an acute visit complaining of sinus congestion for >2 weeks  Called pcp and given azithromycin which did help but then symptoms recurred  Note she has allergy to augmentin but tolerates amoxicillin well  Review of Systems   Constitutional: Positive for chills  Negative for activity change, fatigue and fever  HENT: Positive for congestion, postnasal drip and sinus pressure  Negative for rhinorrhea, sore throat and trouble swallowing  Eyes: Negative for visual disturbance  Respiratory: Negative for cough and shortness of breath  Cardiovascular: Negative for chest pain  Gastrointestinal: Negative for constipation, diarrhea and nausea  Genitourinary: Negative for difficulty urinating  Musculoskeletal: Negative for myalgias  Skin: Negative for rash  All other systems reviewed and are negative  Medications:   Meds/Allergies   Current Outpatient Medications   Medication Sig Dispense Refill    ascorbic acid (VITAMIN C) 500 mg tablet Take 500 mg by mouth daily   atorvastatin (LIPITOR) 10 mg tablet TAKE 1 TABLET BY MOUTH  DAILY 90 tablet 1    Biotin 24509 MCG TABS Take by mouth      Calcium Carbonate-Vit D-Min (CALTRATE 600+D PLUS MINERALS PO) Take by mouth daily        levothyroxine 88 mcg tablet Take 1 tablet (88 mcg total) by mouth daily 30 tablet 5    loratadine (CLARITIN) 10 mg tablet Take 10 mg by mouth daily      mometasone (NASONEX) 50 mcg/act nasal spray USE 2 SPRAYS IN EACH  NOSTRIL DAILY 51 g 1    Multiple Vitamins-Minerals (CENTRUM SILVER PO) Take by mouth daily   omeprazole (PriLOSEC) 40 MG capsule TAKE 1 CAPSULE BY MOUTH  DAILY 90 capsule 1    acetaminophen-codeine (TYLENOL #3) 300-30 mg per tablet Take 1 tablet by mouth every 4 (four) hours as needed for moderate pain for up to 6 doses (Patient not taking: Reported on 11/26/2019) 6 tablet 0    amoxicillin (AMOXIL) 500 mg capsule Take 2 capsules (1,000 mg total) by mouth every 12 (twelve) hours for 10 days 40 capsule 0     No current facility-administered medications for this visit  Allergies   Allergen Reactions    Avelox [Moxifloxacin] Hives    Augmentin [Amoxicillin-Pot Clavulanate] Rash     OBJECTIVE    Vitals:   Vitals:    11/26/19 1453   BP: 116/80   BP Location: Left arm   Patient Position: Sitting   Cuff Size: Large   Pulse: 101   Resp: 16   Temp: 98 3 °F (36 8 °C)   TempSrc: Tympanic   SpO2: 98%   Weight: 74 6 kg (164 lb 6 4 oz)   Height: 5' 3" (1 6 m)       Physical Exam   Constitutional: Vital signs are normal  She appears well-developed and well-nourished  She does not appear ill  No distress  HENT:   Head: Normocephalic and atraumatic  Right Ear: External ear normal  A middle ear effusion is present  Left Ear: External ear normal  A middle ear effusion is present  Nose: Mucosal edema and rhinorrhea present  Mouth/Throat: Uvula is midline, oropharynx is clear and moist and mucous membranes are normal  No tonsillar exudate  Eyes: Conjunctivae, EOM and lids are normal    Neck: No JVD present  No tracheal deviation present  Cardiovascular: Intact distal pulses  Pulmonary/Chest: No accessory muscle usage  No respiratory distress  She has no decreased breath sounds  She has no wheezes  She has no rhonchi  She has no rales     Abdominal: Normal appearance  Neurological: She is alert  Skin: No rash noted  She is not diaphoretic  Psychiatric: She has a normal mood and affect  Her speech is normal and behavior is normal  She expresses no suicidal ideation  Nursing note and vitals reviewed

## 2019-11-26 NOTE — TELEPHONE ENCOUNTER
Patient called stating still not feeling well  She got the Z-DANYEL last week and felt fine over the weekend but now has all the symptoms back  Patient is asking if amoxicillin could be called in?      [facility] [address]   Aware to check with pharmacy

## 2019-12-23 ENCOUNTER — TELEPHONE (OUTPATIENT)
Dept: FAMILY MEDICINE CLINIC | Facility: CLINIC | Age: 74
End: 2019-12-23

## 2019-12-23 DIAGNOSIS — H92.09 OTALGIA, UNSPECIFIED LATERALITY: Primary | ICD-10-CM

## 2019-12-23 RX ORDER — AZITHROMYCIN 250 MG/1
TABLET, FILM COATED ORAL
Qty: 6 TABLET | Refills: 0 | Status: SHIPPED | OUTPATIENT
Start: 2019-12-23 | End: 2019-12-27

## 2019-12-23 NOTE — TELEPHONE ENCOUNTER
Patient called stating she is having a lot of pressures in her ears  When you saw her  a few weeks ago you had looked at her ears and stated there was some fluid in the one ear  Now patient is getting over the stomach bug from last week but the head pressure and ear pressure are really bad  No sore throat, no coughing, no congestion, no fever  Patient is asking if something could be called in?      [facility] [address]   Aware to check with pharamcy

## 2020-01-17 DIAGNOSIS — E06.3 HYPOTHYROIDISM DUE TO HASHIMOTO'S THYROIDITIS: Primary | ICD-10-CM

## 2020-01-17 DIAGNOSIS — E03.8 HYPOTHYROIDISM DUE TO HASHIMOTO'S THYROIDITIS: Primary | ICD-10-CM

## 2020-01-20 ENCOUNTER — APPOINTMENT (OUTPATIENT)
Dept: LAB | Facility: CLINIC | Age: 75
End: 2020-01-20
Payer: COMMERCIAL

## 2020-01-20 DIAGNOSIS — E06.3 HYPOTHYROIDISM DUE TO HASHIMOTO'S THYROIDITIS: ICD-10-CM

## 2020-01-20 DIAGNOSIS — E03.8 HYPOTHYROIDISM DUE TO HASHIMOTO'S THYROIDITIS: ICD-10-CM

## 2020-01-20 LAB — TSH SERPL DL<=0.05 MIU/L-ACNC: 2.35 UIU/ML (ref 0.36–3.74)

## 2020-01-20 PROCEDURE — 84443 ASSAY THYROID STIM HORMONE: CPT

## 2020-01-20 PROCEDURE — 36415 COLL VENOUS BLD VENIPUNCTURE: CPT

## 2020-01-27 ENCOUNTER — OFFICE VISIT (OUTPATIENT)
Dept: FAMILY MEDICINE CLINIC | Facility: CLINIC | Age: 75
End: 2020-01-27
Payer: COMMERCIAL

## 2020-01-27 VITALS
BODY MASS INDEX: 28.76 KG/M2 | SYSTOLIC BLOOD PRESSURE: 148 MMHG | WEIGHT: 162.3 LBS | HEIGHT: 63 IN | DIASTOLIC BLOOD PRESSURE: 84 MMHG | RESPIRATION RATE: 16 BRPM | TEMPERATURE: 97.8 F

## 2020-01-27 DIAGNOSIS — E06.3 HYPOTHYROIDISM DUE TO HASHIMOTO'S THYROIDITIS: ICD-10-CM

## 2020-01-27 DIAGNOSIS — E78.2 MIXED HYPERLIPIDEMIA: ICD-10-CM

## 2020-01-27 DIAGNOSIS — E03.8 HYPOTHYROIDISM DUE TO HASHIMOTO'S THYROIDITIS: ICD-10-CM

## 2020-01-27 DIAGNOSIS — Z00.00 MEDICARE ANNUAL WELLNESS VISIT, SUBSEQUENT: Primary | ICD-10-CM

## 2020-01-27 DIAGNOSIS — R03.0 WHITE COAT SYNDROME WITH HIGH BLOOD PRESSURE WITHOUT HYPERTENSION: ICD-10-CM

## 2020-01-27 PROBLEM — D17.1 LIPOMA OF BACK: Status: RESOLVED | Noted: 2018-12-18 | Resolved: 2020-01-27

## 2020-01-27 PROCEDURE — 1160F RVW MEDS BY RX/DR IN RCRD: CPT | Performed by: FAMILY MEDICINE

## 2020-01-27 PROCEDURE — 1170F FXNL STATUS ASSESSED: CPT | Performed by: FAMILY MEDICINE

## 2020-01-27 PROCEDURE — 99213 OFFICE O/P EST LOW 20 MIN: CPT | Performed by: FAMILY MEDICINE

## 2020-01-27 PROCEDURE — 1125F AMNT PAIN NOTED PAIN PRSNT: CPT | Performed by: FAMILY MEDICINE

## 2020-01-27 PROCEDURE — 3008F BODY MASS INDEX DOCD: CPT | Performed by: FAMILY MEDICINE

## 2020-01-27 PROCEDURE — 1101F PT FALLS ASSESS-DOCD LE1/YR: CPT | Performed by: FAMILY MEDICINE

## 2020-01-27 PROCEDURE — G0439 PPPS, SUBSEQ VISIT: HCPCS | Performed by: FAMILY MEDICINE

## 2020-01-27 PROCEDURE — 1036F TOBACCO NON-USER: CPT | Performed by: FAMILY MEDICINE

## 2020-01-27 PROCEDURE — 3288F FALL RISK ASSESSMENT DOCD: CPT | Performed by: FAMILY MEDICINE

## 2020-01-27 PROCEDURE — 3725F SCREEN DEPRESSION PERFORMED: CPT | Performed by: FAMILY MEDICINE

## 2020-01-27 NOTE — PROGRESS NOTES
Assessment and Plan:     Problem List Items Addressed This Visit     None        BMI Counseling: Body mass index is 28 75 kg/m²  The BMI is above normal  Nutrition recommendations include decreasing portion sizes, moderation in carbohydrate intake, increasing intake of lean protein, reducing intake of saturated and trans fat and reducing intake of cholesterol  Exercise recommendations include exercising 3-5 times per week  Preventive health issues were discussed with patient, and age appropriate screening tests were ordered as noted in patient's After Visit Summary  Personalized health advice and appropriate referrals for health education or preventive services given if needed, as noted in patient's After Visit Summary       History of Present Illness:     Patient presents for Medicare Annual Wellness visit    Patient Care Team:  Supa Ruff MD as PCP - General  Grace Luciano MD     Problem List:     Patient Active Problem List   Diagnosis    Esophageal reflux    Hyperlipidemia    Hypothyroidism    White coat syndrome with high blood pressure without hypertension    Basal cell carcinoma of skin of nose    Nontoxic single thyroid nodule    Lipoma of back      Past Medical and Surgical History:     Past Medical History:   Diagnosis Date    Acid reflux     Basal cell carcinoma     basal cell on back and nose    Dental bridge present     upper    Disease of thyroid gland     Hashimoto's disease     High cholesterol     Osteopenia     Seasonal allergies     mold,pollen    Thyroid disease     thyroid nodules    Wears glasses      Past Surgical History:   Procedure Laterality Date    COLONOSCOPY      ESOPHAGOGASTRODUODENOSCOPY      MOHS RECONSTRUCTION N/A 10/4/2016    Procedure: RECONSTRUCTION MOHS DEFECT NASAL TIP WITH FLAP;  Surgeon: Gil Virk MD;  Location: [facility];  Service:     ME EXC SKIN BENIG >4 CM TRUNK,ARM,LEG N/A 3/19/2019    Procedure: MID UPPER BACK LIPOMA EXCISION;  Surgeon: Jerry Cox MD;  [facility];  Service: Plastics    NV RECMPL WND TRUNK 2 6-7 5 CM N/A 3/19/2019    Procedure: COMPLEX CLOSURE;  Surgeon: Jerry Cox MD;  [facility];  Service: Plastics    SKIN BIOPSY      SKIN CANCER EXCISION      basal cell CA on back and nose-2009 and 2016    TONSILLECTOMY        Family History:     Family History   Problem Relation Age of Onset    Colon cancer Mother 61    Pancreatic cancer Father 62    Thyroid cancer Father     Coronary artery disease Paternal Grandmother     Breast cancer Cousin 54    No Known Problems Maternal Aunt       Social History:     Social History     Socioeconomic History    Marital status: /Civil Union     Spouse name: None    Number of children: None    Years of education: None    Highest education level: None   Occupational History    None   Social Needs    Financial resource strain: None    Food insecurity:     Worry: None     Inability: None    Transportation needs:     Medical: None     Non-medical: None   Tobacco Use    Smoking status: Former Smoker     Packs/day: 1 00     Years: 20 00     Pack years: 20 00    Smokeless tobacco: Never Used    Tobacco comment: quit 30 yrs ago   Substance and Sexual Activity    Alcohol use: Yes     Frequency: 4 or more times a week     Drinks per session: 1 or 2     Comment: wine mainly on weekends / social per [employer]    Drug use: No    Sexual activity: Never   Lifestyle    Physical activity:     Days per week: None     Minutes per session: None    Stress: None   Relationships    Social connections:     Talks on phone: None     Gets together: None     Attends [employer] service: None     Active member of club or organization: None     Attends meetings of clubs or organizations: None     Relationship status: None    Intimate partner violence:     Fear of current or ex partner: None     Emotionally abused: None     Physically abused: None     Forced sexual activity: None   Other Topics Concern    None   Social History Narrative    Daily coffee consumption; 1 cp/day    Daily cola consumption; ____ cp/day    Daily tea consumption; 1 cp/day    No domestic violence history    Uses seatbelts       Medications and Allergies:     Current Outpatient Medications   Medication Sig Dispense Refill    acetaminophen-codeine (TYLENOL #3) 300-30 mg per tablet Take 1 tablet by mouth every 4 (four) hours as needed for moderate pain for up to 6 doses 6 tablet 0    ascorbic acid (VITAMIN C) 500 mg tablet Take 500 mg by mouth daily   atorvastatin (LIPITOR) 10 mg tablet TAKE 1 TABLET BY MOUTH  DAILY 90 tablet 1    Biotin 38464 MCG TABS Take by mouth      Calcium Carbonate-Vit D-Min (CALTRATE 600+D PLUS MINERALS PO) Take by mouth daily   levothyroxine 88 mcg tablet Take 1 tablet (88 mcg total) by mouth daily 30 tablet 5    loratadine (CLARITIN) 10 mg tablet Take 10 mg by mouth daily      mometasone (NASONEX) 50 mcg/act nasal spray USE 2 SPRAYS IN EACH  NOSTRIL DAILY 51 g 1    Multiple Vitamins-Minerals (CENTRUM SILVER PO) Take by mouth daily   omeprazole (PriLOSEC) 40 MG capsule TAKE 1 CAPSULE BY MOUTH  DAILY 90 capsule 1     No current facility-administered medications for this visit        Allergies   Allergen Reactions    Avelox [Moxifloxacin] Hives    Augmentin [Amoxicillin-Pot Clavulanate] Rash      Immunizations:     Immunization History   Administered Date(s) Administered    Influenza Split High Dose Preservative Free IM 10/16/2012, 10/03/2013, 10/14/2014, 09/22/2015, 09/14/2016, 08/30/2017    Influenza TIV (IM) 09/01/2011    Influenza, high dose seasonal 0 5 mL 09/25/2018, 09/26/2019    Pneumococcal Conjugate 13-Valent 12/01/2016    Pneumococcal Polysaccharide PPV23 11/06/2013    TD (adult) Preservative Free 01/23/2019    Tuberculin Skin Test-PPD Intradermal 04/20/2004      Health Maintenance:         Topic Date Due    DXA SCAN 05/04/2020    MAMMOGRAM  05/14/2020    Cervical Cancer Screening  04/09/2021    CRC Screening: Colonoscopy  07/17/2028    Hepatitis C Screening  Completed         Topic Date Due    DTaP,Tdap,and Td Vaccines (1 - Tdap) 05/28/1956      Medicare Health Risk Assessment:     /84   Temp 97 8 °F (36 6 °C)   Resp 16   Ht 5' 3" (1 6 m)   Wt 73 6 kg (162 lb 4 8 oz)   BMI 28 75 kg/m²      Sushil Reid is here for her Subsequent Wellness visit  Last Medicare Wellness visit information reviewed, patient interviewed and updates made to the record today  Health Risk Assessment:   Patient rates overall health as very good  Patient feels that their physical health rating is same  Eyesight was rated as same  Hearing was rated as slightly worse  Patient feels that their emotional and mental health rating is slightly worse  Pain experienced in the last 7 days has been none  Patient states that she has experienced no weight loss or gain in last 6 months  Depression Screening:   PHQ-2 Score: 1      Fall Risk Screening: In the past year, patient has experienced: no history of falling in past year      Urinary Incontinence Screening:   Patient has not leaked urine accidently in the last six months  Home Safety:  Patient does not have trouble with stairs inside or outside of their home  Patient has working smoke alarms and has working carbon monoxide detector  Home safety hazards include: none  Nutrition:   Current diet is Regular  Medications:   Patient is currently taking over-the-counter supplements  OTC medications include: see medication list  Patient is able to manage medications  Activities of Daily Living (ADLs)/Instrumental Activities of Daily Living (IADLs):   Walk and transfer into and out of bed and chair?: Yes  Dress and groom yourself?: Yes    Bathe or shower yourself?: Yes    Feed yourself?  Yes  Do your laundry/housekeeping?: Yes  Manage your money, pay your bills and track your expenses?: Yes  Make your own meals?: Yes    Do your own shopping?: Yes    Previous Hospitalizations:   Any hospitalizations or ED visits within the last 12 months?: No      Advance Care Planning:   Living will: Yes    Durable POA for healthcare: Yes    Advanced directive: Yes    Advanced directive counseling given: Yes      Cognitive Screening:   Provider or family/friend/caregiver concerned regarding cognition?: No    PREVENTIVE SCREENINGS      Cardiovascular Screening:    General: Screening Not Indicated and History Lipid Disorder      Diabetes Screening:     General: Screening Current      Colorectal Cancer Screening:     General: Screening Current      Breast Cancer Screening:     General: Screening Current      Cervical Cancer Screening:    General: Screening Not Indicated      Osteoporosis Screening:    General: Screening Current      Abdominal Aortic Aneurysm (AAA) Screening:        General: Screening Not Indicated      Lung Cancer Screening:     General: Screening Not Indicated      Hepatitis C Screening:    General: Screening Current    Other Counseling Topics:   Regular weightbearing exercise and calcium and vitamin D intake         Cheryl Martinez MD

## 2020-01-27 NOTE — PROGRESS NOTES
Assessment/Plan:    Hypothyroidism  TSH now at goal  Cont present dose  Recheck 6m    White coat syndrome with high blood pressure without hypertension  BP a little elevated today though stress is a little high  Continue to monitor  Recheck 6m    Hyperlipidemia  LDL = 117 last fall  Cont atorvastatin  Recheck 6m       Diagnoses and all orders for this visit:    Medicare annual wellness visit, subsequent    Hypothyroidism due to Hashimoto's thyroiditis  -     TSH, 3rd generation; Future    Mixed hyperlipidemia  -     Comprehensive metabolic panel; Future  -     Lipid panel; Future    White coat syndrome with high blood pressure without hypertension          Subjective:      Patient ID: Kolby Sosa is a 69 y o  female  f/u multiple med issues   - pt feels well  No new complaints  - pt states that her congestion in better since the weather got colder  Occ sinus pressure but nothing persistent  No fever/chills  - pt does not exercise regularly  Pt denies CP, palp, lightheadedness or other CV symptoms with or without exertion  - no GI or Gu issues  Up to date with colonoscopy  - R arm/shoulder pain has resolved  No recurrent issues  - no other concerns  AWV done      The following portions of the patient's history were reviewed and updated as appropriate:   She  has a past medical history of Acid reflux, Basal cell carcinoma, Dental bridge present, Disease of thyroid gland, Hashimoto's disease, High cholesterol, Osteopenia, Seasonal allergies, Thyroid disease, and Wears glasses  She   Patient Active Problem List    Diagnosis Date Noted    White coat syndrome with high blood pressure without hypertension 04/12/2017    Basal cell carcinoma of skin of nose 08/16/2016    Esophageal reflux 09/26/2012    Hyperlipidemia 09/26/2012    Hypothyroidism 09/26/2012    Nontoxic single thyroid nodule 09/26/2012     She  has a past surgical history that includes Skin cancer excision;  Tonsillectomy; Esophagogastroduodenoscopy; Colonoscopy; Skin biopsy; MOHS RECONSTRUCTION (N/A, 10/4/2016); pr exc skin benig >4 cm trunk,arm,leg (N/A, 3/19/2019); and pr recmpl wnd trunk 2 6-7 5 cm (N/A, 3/19/2019)  She  reports that she has quit smoking  She has a 20 00 pack-year smoking history  She has never used smokeless tobacco  She reports that she drinks alcohol  She reports that she does not use drugs  Current Outpatient Medications   Medication Sig Dispense Refill    acetaminophen-codeine (TYLENOL #3) 300-30 mg per tablet Take 1 tablet by mouth every 4 (four) hours as needed for moderate pain for up to 6 doses 6 tablet 0    ascorbic acid (VITAMIN C) 500 mg tablet Take 500 mg by mouth daily   atorvastatin (LIPITOR) 10 mg tablet TAKE 1 TABLET BY MOUTH  DAILY 90 tablet 1    Biotin 83179 MCG TABS Take by mouth      Calcium Carbonate-Vit D-Min (CALTRATE 600+D PLUS MINERALS PO) Take by mouth daily   levothyroxine 88 mcg tablet Take 1 tablet (88 mcg total) by mouth daily 30 tablet 5    loratadine (CLARITIN) 10 mg tablet Take 10 mg by mouth daily      mometasone (NASONEX) 50 mcg/act nasal spray USE 2 SPRAYS IN EACH  NOSTRIL DAILY 51 g 1    Multiple Vitamins-Minerals (CENTRUM SILVER PO) Take by mouth daily   omeprazole (PriLOSEC) 40 MG capsule TAKE 1 CAPSULE BY MOUTH  DAILY 90 capsule 1     No current facility-administered medications for this visit  She is allergic to avelox [moxifloxacin] and augmentin [amoxicillin-pot clavulanate]       Review of Systems   Constitutional: Negative  HENT: Negative  Eyes: Negative  Respiratory: Negative  Cardiovascular: Negative  Gastrointestinal: Negative  Endocrine: Negative  Genitourinary: Negative  Musculoskeletal: Negative  Skin: Negative  Allergic/Immunologic: Negative  Neurological: Negative  Hematological: Negative  Psychiatric/Behavioral: Negative            Objective:      /84   Temp 97 8 °F (36 6 °C)   Resp 16 Ht 5' 3" (1 6 m)   Wt 73 6 kg (162 lb 4 8 oz)   BMI 28 75 kg/m²          Physical Exam   Constitutional: She is oriented to person, place, and time  She appears well-developed and well-nourished  HENT:   Head: Normocephalic and atraumatic  Right Ear: External ear normal    Left Ear: External ear normal    Nose: Nose normal    Mouth/Throat: Oropharynx is clear and moist  No oropharyngeal exudate  Eyes: Pupils are equal, round, and reactive to light  Conjunctivae and EOM are normal    Neck: Normal range of motion  Neck supple  No JVD present  No thyromegaly present  Cardiovascular: Normal rate, regular rhythm and intact distal pulses  No murmur heard  Pulmonary/Chest: Effort normal and breath sounds normal    Abdominal: Soft  She exhibits no distension  There is no tenderness  Musculoskeletal: Normal range of motion  Lymphadenopathy:     She has no cervical adenopathy  Neurological: She is alert and oriented to person, place, and time  She has normal reflexes  She displays normal reflexes  She exhibits normal muscle tone  Coordination normal    Skin: Skin is warm and dry  She is not diaphoretic  Psychiatric: She has a normal mood and affect

## 2020-01-27 NOTE — PATIENT INSTRUCTIONS
Medicare Preventive Visit Patient Instructions  Thank you for completing your Welcome to Medicare Visit or Medicare Annual Wellness Visit today  Your next wellness visit will be due in one year (1/27/2021)  The screening/preventive services that you may require over the next 5-10 years are detailed below  Some tests may not apply to you based off risk factors and/or age  Screening tests ordered at today's visit but not completed yet may show as past due  Also, please note that scanned in results may not display below  Preventive Screenings:  Service Recommendations Previous Testing/Comments   Colorectal Cancer Screening  * Colonoscopy    * Fecal Occult Blood Test (FOBT)/Fecal Immunochemical Test (FIT)  * Fecal DNA/Cologuard Test  * Flexible Sigmoidoscopy Age: 54-65 years old   Colonoscopy: every 10 years (may be performed more frequently if at higher risk)  OR  FOBT/FIT: every 1 year  OR  Cologuard: every 3 years  OR  Sigmoidoscopy: every 5 years  Screening may be recommended earlier than age 48 if at higher risk for colorectal cancer  Also, an individualized decision between you and your healthcare provider will decide whether screening between the ages of 74-80 would be appropriate  Colonoscopy: 07/17/2018  FOBT/FIT: Not on file  Cologuard: Not on file  Sigmoidoscopy: Not on file    Screening Current     Breast Cancer Screening Age: 36 years old  Frequency: every 1-2 years  Not required if history of left and right mastectomy Mammogram: 05/14/2019    Screening Current   Cervical Cancer Screening Between the ages of 21-29, pap smear recommended once every 3 years  Between the ages of 33-67, can perform pap smear with HPV co-testing every 5 years     Recommendations may differ for women with a history of total hysterectomy, cervical cancer, or abnormal pap smears in past  Pap Smear: 04/09/2018    Screening Not Indicated   Hepatitis C Screening Once for adults born between 1945 and 1965  More frequently in patients at high risk for Hepatitis C Hep C Antibody: 01/23/2019    Screening Current   Diabetes Screening 1-2 times per year if you're at risk for diabetes or have pre-diabetes Fasting glucose: 96 mg/dL   A1C: 5 6 %    Screening Current   Cholesterol Screening Once every 5 years if you don't have a lipid disorder  May order more often based on risk factors  Lipid panel: 10/24/2019    Screening Not Indicated  History Lipid Disorder     Other Preventive Screenings Covered by Medicare:  1  Abdominal Aortic Aneurysm (AAA) Screening: covered once if your at risk  You're considered to be at risk if you have a family history of AAA  2  Lung Cancer Screening: covers low dose CT scan once per year if you meet all of the following conditions: (1) Age 50-69; (2) No signs or symptoms of lung cancer; (3) Current smoker or have quit smoking within the last 15 years; (4) You have a tobacco smoking history of at least 30 pack years (packs per day multiplied by number of years you smoked); (5) You get a written order from a healthcare provider  3  Glaucoma Screening: covered annually if you're considered high risk: (1) You have diabetes OR (2) Family history of glaucoma OR (3)  aged 48 and older OR (3)  American aged 72 and older  3  Osteoporosis Screening: covered every 2 years if you meet one of the following conditions: (1) You're estrogen deficient and at risk for osteoporosis based off medical history and other findings; (2) Have a vertebral abnormality; (3) On glucocorticoid therapy for more than 3 months; (4) Have primary hyperparathyroidism; (5) On osteoporosis medications and need to assess response to drug therapy  · Last bone density test (DXA Scan): 05/04/2017  5  HIV Screening: covered annually if you're between the age of 12-76  Also covered annually if you are younger than 13 and older than 72 with risk factors for HIV infection   For pregnant patients, it is covered up to 3 times per pregnancy  Immunizations:  Immunization Recommendations   Influenza Vaccine Annual influenza vaccination during flu season is recommended for all persons aged >= 6 months who do not have contraindications   Pneumococcal Vaccine (Prevnar and Pneumovax)  * Prevnar = PCV13  * Pneumovax = PPSV23   Adults 25-60 years old: 1-3 doses may be recommended based on certain risk factors  Adults 72 years old: Prevnar (PCV13) vaccine recommended followed by Pneumovax (PPSV23) vaccine  If already received PPSV23 since turning 65, then PCV13 recommended at least one year after PPSV23 dose  Hepatitis B Vaccine 3 dose series if at intermediate or high risk (ex: diabetes, end stage renal disease, liver disease)   Tetanus (Td) Vaccine - COST NOT COVERED BY MEDICARE PART B Following completion of primary series, a booster dose should be given every 10 years to maintain immunity against tetanus  Td may also be given as tetanus wound prophylaxis  Tdap Vaccine - COST NOT COVERED BY MEDICARE PART B Recommended at least once for all adults  For pregnant patients, recommended with each pregnancy  Shingles Vaccine (Shingrix) - COST NOT COVERED BY MEDICARE PART B  2 shot series recommended in those aged 48 and above     Health Maintenance Due:      Topic Date Due    DXA SCAN  05/04/2020    MAMMOGRAM  05/14/2020    Cervical Cancer Screening  04/09/2021    CRC Screening: Colonoscopy  07/17/2028    Hepatitis C Screening  Completed     Immunizations Due:      Topic Date Due    DTaP,Tdap,and Td Vaccines (1 - Tdap) 05/28/1956     Advance Directives   What are advance directives? Advance directives are legal documents that state your wishes and plans for medical care  These plans are made ahead of time in case you lose your ability to make decisions for yourself  Advance directives can apply to any medical decision, such as the treatments you want, and if you want to donate organs  What are the types of advance directives?   There are many types of advance directives, and each state has rules about how to use them  You may choose a combination of any of the following:  · Living will: This is a written record of the treatment you want  You can also choose which treatments you do not want, which to limit, and which to stop at a certain time  This includes surgery, medicine, IV fluid, and tube feedings  · Durable power of  for healthcare [facility]): This is a written record that states who you want to make healthcare choices for you when you are unable to make them for yourself  This person, called a proxy, is usually a family member or a friend  You may choose more than 1 proxy  · Do not resuscitate (DNR) order:  A DNR order is used in case your heart stops beating or you stop breathing  It is a request not to have certain forms of treatment, such as CPR  A DNR order may be included in other types of advance directives  · Medical directive: This covers the care that you want if you are in a coma, near death, or unable to make decisions for yourself  You can list the treatments you want for each condition  Treatment may include pain medicine, surgery, blood transfusions, dialysis, IV or tube feedings, and a ventilator (breathing machine)  · Values history: This document has questions about your views, beliefs, and how you feel and think about life  This information can help others choose the care that you would choose  Why are advance directives important? An advance directive helps you control your care  Although spoken wishes may be used, it is better to have your wishes written down  Spoken wishes can be misunderstood, or not followed  Treatments may be given even if you do not want them  An advance directive may make it easier for your family to make difficult choices about your care     Weight Management   Why it is important to manage your weight:  Being overweight increases your risk of health conditions such as heart disease, high blood pressure, type 2 diabetes, and certain types of cancer  It can also increase your risk for osteoarthritis, sleep apnea, and other respiratory problems  Aim for a slow, steady weight loss  Even a small amount of weight loss can lower your risk of health problems  How to lose weight safely:  A safe and healthy way to lose weight is to eat fewer calories and get regular exercise  You can lose up about 1 pound a week by decreasing the number of calories you eat by 500 calories each day  Healthy meal plan for weight management:  A healthy meal plan includes a variety of foods, contains fewer calories, and helps you stay healthy  A healthy meal plan includes the following:  · Eat whole-grain foods more often  A healthy meal plan should contain fiber  Fiber is the part of grains, fruits, and vegetables that is not broken down by your body  Whole-grain foods are healthy and provide extra fiber in your diet  Some examples of whole-grain foods are whole-wheat breads and pastas, oatmeal, brown rice, and bulgur  · Eat a variety of vegetables every day  Include dark, leafy greens such as spinach, kale, yannick greens, and mustard greens  Eat yellow and orange vegetables such as carrots, sweet potatoes, and winter squash  · Eat a variety of fruits every day  Choose fresh or canned fruit (canned in its own juice or light syrup) instead of juice  Fruit juice has very little or no fiber  · Eat low-fat dairy foods  Drink fat-free (skim) milk or 1% milk  Eat fat-free yogurt and low-fat cottage cheese  Try low-fat cheeses such as mozzarella and other reduced-fat cheeses  · Choose meat and other protein foods that are low in fat  Choose beans or other legumes such as split peas or lentils  Choose fish, skinless poultry (chicken or turkey), or lean cuts of red meat (beef or pork)  Before you cook meat or poultry, cut off any visible fat  · Use less fat and oil  Try baking foods instead of frying them  Add less fat, such as margarine, sour cream, regular salad dressing and mayonnaise to foods  Eat fewer high-fat foods  Some examples of high-fat foods include french fries, doughnuts, ice cream, and cakes  · Eat fewer sweets  Limit foods and drinks that are high in sugar  This includes candy, cookies, regular soda, and sweetened drinks  Exercise:  Exercise at least 30 minutes per day on most days of the week  Some examples of exercise include walking, biking, dancing, and swimming  You can also fit in more physical activity by taking the stairs instead of the elevator or parking farther away from stores  Ask your healthcare provider about the best exercise plan for you  © Copyright [employer] 2018 Information is for End User's use only and may not be sold, redistributed or otherwise used for commercial purposes   All illustrations and images included in CareNotes® are the copyrighted property of [employer]  or [address] [place] [address]

## 2020-03-05 DIAGNOSIS — E06.3 HYPOTHYROIDISM DUE TO HASHIMOTO'S THYROIDITIS: Primary | ICD-10-CM

## 2020-03-05 DIAGNOSIS — K21.9 GASTROESOPHAGEAL REFLUX DISEASE, ESOPHAGITIS PRESENCE NOT SPECIFIED: ICD-10-CM

## 2020-03-05 DIAGNOSIS — E03.8 HYPOTHYROIDISM DUE TO HASHIMOTO'S THYROIDITIS: Primary | ICD-10-CM

## 2020-03-05 DIAGNOSIS — J30.9 CHRONIC ALLERGIC RHINITIS: ICD-10-CM

## 2020-03-05 RX ORDER — LEVOTHYROXINE SODIUM 0.1 MG/1
TABLET ORAL
Qty: 90 TABLET | Refills: 1 | OUTPATIENT
Start: 2020-03-05

## 2020-03-05 RX ORDER — MOMETASONE FUROATE 50 UG/1
SPRAY, METERED NASAL
Qty: 51 G | Refills: 1 | Status: SHIPPED | OUTPATIENT
Start: 2020-03-05 | End: 2020-07-09

## 2020-03-05 RX ORDER — OMEPRAZOLE 40 MG/1
CAPSULE, DELAYED RELEASE ORAL
Qty: 90 CAPSULE | Refills: 1 | Status: SHIPPED | OUTPATIENT
Start: 2020-03-05 | End: 2020-07-09

## 2020-03-09 DIAGNOSIS — E03.9 HYPOTHYROIDISM, UNSPECIFIED TYPE: ICD-10-CM

## 2020-03-09 RX ORDER — LEVOTHYROXINE SODIUM 88 UG/1
88 TABLET ORAL DAILY
Qty: 90 TABLET | Refills: 1 | Status: SHIPPED | OUTPATIENT
Start: 2020-03-09 | End: 2020-07-09

## 2020-04-20 DIAGNOSIS — E78.2 MIXED HYPERLIPIDEMIA: ICD-10-CM

## 2020-04-20 RX ORDER — ATORVASTATIN CALCIUM 10 MG/1
TABLET, FILM COATED ORAL
Qty: 90 TABLET | Refills: 1 | Status: SHIPPED | OUTPATIENT
Start: 2020-04-20 | End: 2020-11-26

## 2020-06-11 ENCOUNTER — TELEPHONE (OUTPATIENT)
Dept: FAMILY MEDICINE CLINIC | Facility: CLINIC | Age: 75
End: 2020-06-11

## 2020-06-11 DIAGNOSIS — J01.10 ACUTE NON-RECURRENT FRONTAL SINUSITIS: Primary | ICD-10-CM

## 2020-06-11 RX ORDER — AZITHROMYCIN 250 MG/1
TABLET, FILM COATED ORAL
Qty: 6 TABLET | Refills: 0 | Status: SHIPPED | OUTPATIENT
Start: 2020-06-11 | End: 2020-06-15

## 2020-07-02 ENCOUNTER — ANNUAL EXAM (OUTPATIENT)
Dept: OBGYN CLINIC | Facility: CLINIC | Age: 75
End: 2020-07-02
Payer: COMMERCIAL

## 2020-07-02 VITALS
TEMPERATURE: 98 F | HEIGHT: 63 IN | BODY MASS INDEX: 29.06 KG/M2 | SYSTOLIC BLOOD PRESSURE: 130 MMHG | DIASTOLIC BLOOD PRESSURE: 80 MMHG | WEIGHT: 164 LBS

## 2020-07-02 DIAGNOSIS — Z01.419 ENCOUNTER FOR GYNECOLOGICAL EXAMINATION (GENERAL) (ROUTINE) WITHOUT ABNORMAL FINDINGS: Primary | ICD-10-CM

## 2020-07-02 PROCEDURE — G0101 CA SCREEN;PELVIC/BREAST EXAM: HCPCS | Performed by: PHYSICIAN ASSISTANT

## 2020-07-02 NOTE — PROGRESS NOTES
Assessment/Plan   Diagnoses and all orders for this visit:    Encounter for gynecological examination (general) (routine) without abnormal findings    The current ASCCP guidelines were reviewed  Patient's last pap was 4/9/18 - WNL (-) ECC atrophy and therefore, due to her age and medical history a pap with HPV cotesting is no longer indicated  I emphasized the importance of an annual pelvic and breast exam  Patient ok to discontinue pap smear screening at this time  Discussion  I have discussed the importance of monthly self-breast exams, exercise and healthy diet as well as adequate intake of calcium and vitamin D  Encourage at least 1200 mg calcium citrate + 2000 IUs vitamin D3 divided through diet and supplement throughout the day; Encourage 30-40 min weight bearing exercise most days of week  STD testing - declines  A yearly mammogram is recommended for breast cancer screening starting at age 36 - order in Epic and scheduled for next week; In addition, colon cancer screening with a colonoscopy is recommended starting at age 39 and reviewed benefits - patient due next year  I have reviewed the patient's risk factors for osteoporosis and ordered a dexa scan if applicable  Pt to check with insurance for coverage - plans to get a follow-up script from PCP in August  All questions have been answered to her satisfaction  RTO for APE or sooner if needed    Subjective     HPI   Ginger Justice is a 70 y o  female who presents for annual well woman exam    LMP - 2001; Denies  bleeding  No vulvar itch/burn; No vaginal itch/burn; No abn discharge or odor; No urinary sx - burning/pain/frequency/hematuria  (+) SBEs - no breast masses, asymmetry, nipple discharge or bleeding, changes in skin of breast, or breast tenderness bilaterally  No abd/pelvic pain or HAs; No menopausal symptoms: No hot flashes/night sweats, (+) vaginal dryness; sleeping well  Pt is not sexually active;  She declines std/hiv/hep testing; Feels safe at home  (+) PCP for routine Bw/care; Last Pap - 18 - WNL (-) ECC atrophy; 3/29/16 - WNL (-) ECC  History of abnormal Pap smear: no  Last mammo - 19 - Birads 1 negative  History of abnormal mammogram: no  Last colonoscopy -  - WNL per patient and due   Last Dexa scan - 17 - osteopenia of spine - PCP blayne/david Stewart since on for 5 years; Has follow-up with PCP in August and will likely get a follow-up script    Review of Systems   Constitutional: Negative for activity change, fatigue, fever and unexpected weight change  HENT: Negative for congestion, dental problem, sinus pressure and sinus pain  Eyes: Negative for visual disturbance  Respiratory: Negative for cough, shortness of breath and wheezing  Cardiovascular: Negative for chest pain and leg swelling  Gastrointestinal: Negative for abdominal distention, abdominal pain, blood in stool, constipation, diarrhea, nausea and vomiting  Endocrine: Negative for polydipsia  Genitourinary: Negative for difficulty urinating, dyspareunia, dysuria, frequency, hematuria, menstrual problem, pelvic pain, urgency, vaginal bleeding, vaginal discharge and vaginal pain  Musculoskeletal: Negative for arthralgias and back pain  Allergic/Immunologic: Negative for environmental allergies  Neurological: Negative for dizziness, seizures and headaches  Psychiatric/Behavioral: Negative for dysphoric mood and sleep disturbance  The patient is not nervous/anxious          The following portions of the patient's history were reviewed and updated as appropriate: allergies, current medications, past family history, past medical history, past social history, past surgical history and problem list          OB History        0    Para   0    Term   0       0    AB   0    Living   0       SAB   0    TAB   0    Ectopic   0    Multiple   0    Live Births   0                 Past Medical History:   Diagnosis Date    Acid reflux     Basal cell carcinoma     basal cell on back and nose    Dental bridge present     upper    Disease of thyroid gland     Hashimoto's disease     High cholesterol     Osteopenia     Seasonal allergies     mold,pollen    Thyroid disease     thyroid nodules    Wears glasses        Past Surgical History:   Procedure Laterality Date    COLONOSCOPY      ESOPHAGOGASTRODUODENOSCOPY      MOHS RECONSTRUCTION N/A 10/4/2016    Procedure: RECONSTRUCTION MOHS DEFECT NASAL TIP WITH FLAP;  Surgeon: Hamlet Ferraro MD;  [facility];  Service:     [place] EXC SKIN BENIG >4 CM TRUNK,ARM,LEG N/A 3/19/2019    Procedure: MID UPPER BACK LIPOMA EXCISION;  Surgeon: Hamlet Ferraro MD;  [facility];  Service: Plastics    [place] RECMPL WND TRUNK 2 6-7 5 CM N/A 3/19/2019    Procedure: COMPLEX CLOSURE;  Surgeon: Hamlet Ferraro MD;  [facility];  Service: Plastics    SKIN BIOPSY      SKIN CANCER EXCISION      basal cell CA on back and nose-2009 and 2016    TONSILLECTOMY         Family History   Problem Relation Age of Onset    Colon cancer Mother 61    Pancreatic cancer Father 62    Thyroid cancer Father     Coronary artery disease Paternal Grandmother     Breast cancer Cousin 54    No Known Problems Maternal Aunt        Social History     Socioeconomic History    Marital status: /Civil Union     Spouse name: Not on file    Number of children: Not on file    Years of education: Not on file    Highest education level: Not on file   Occupational History    Not on file   Social Needs    Financial resource strain: Not on file    Food insecurity:     Worry: Not on file     Inability: Not on file    Transportation needs:     Medical: Not on file     Non-medical: Not on file   Tobacco Use    Smoking status: Former Smoker     Packs/day: 1 00     Years: 20 00     Pack years: 20 00    Smokeless tobacco: Never Used    Tobacco comment: quit 30 yrs ago   Substance and Sexual Activity  Alcohol use: Yes     Frequency: 4 or more times a week     Drinks per session: 1 or 2     Comment: wine mainly on weekends / social per [employer]    Drug use: No    Sexual activity: Never   Lifestyle    Physical activity:     Days per week: Not on file     Minutes per session: Not on file    Stress: Not on file   Relationships    Social connections:     Talks on phone: Not on file     Gets together: Not on file     Attends [employer] service: Not on file     Active member of club or organization: Not on file     Attends meetings of clubs or organizations: Not on file     Relationship status: Not on file    Intimate partner violence:     Fear of current or ex partner: Not on file     Emotionally abused: Not on file     Physically abused: Not on file     Forced sexual activity: Not on file   Other Topics Concern    Not on file   Social History Narrative    Daily coffee consumption; 1 cp/day    Daily cola consumption; ____ cp/day    Daily tea consumption; 1 cp/day    No domestic violence history    Uses seatbelts         Current Outpatient Medications:     acetaminophen-codeine (TYLENOL #3) 300-30 mg per tablet, Take 1 tablet by mouth every 4 (four) hours as needed for moderate pain for up to 6 doses, Disp: 6 tablet, Rfl: 0    ascorbic acid (VITAMIN C) 500 mg tablet, Take 500 mg by mouth daily  , Disp: , Rfl:     atorvastatin (LIPITOR) 10 mg tablet, TAKE 1 TABLET BY MOUTH  DAILY, Disp: 90 tablet, Rfl: 1    Biotin 00251 MCG TABS, Take by mouth, Disp: , Rfl:     Calcium Carbonate-Vit D-Min (CALTRATE 600+D PLUS MINERALS PO), Take by mouth daily  , Disp: , Rfl:     levothyroxine 88 mcg tablet, Take 1 tablet (88 mcg total) by mouth daily, Disp: 90 tablet, Rfl: 1    loratadine (CLARITIN) 10 mg tablet, Take 10 mg by mouth daily, Disp: , Rfl:     mometasone (NASONEX) 50 mcg/act nasal spray, USE 2 SPRAYS IN EACH  NOSTRIL DAILY, Disp: 51 g, Rfl: 1    Multiple Vitamins-Minerals (CENTRUM SILVER PO), Take by mouth daily , Disp: , Rfl:     omeprazole (PriLOSEC) 40 MG capsule, TAKE 1 CAPSULE BY MOUTH  DAILY, Disp: 90 capsule, Rfl: 1    Allergies   Allergen Reactions    Avelox [Moxifloxacin] Hives    Augmentin [Amoxicillin-Pot Clavulanate] Rash       Objective   Vitals:    07/02/20 1000   BP: 130/80   BP Location: Right arm   Patient Position: Sitting   Cuff Size: Standard   Temp: 98 °F (36 7 °C)   TempSrc: Tympanic   Weight: 74 4 kg (164 lb)   Height: 5' 3" (1 6 m)     Physical Exam   Constitutional: She appears well-developed and well-nourished  No distress  Neck: No thyromegaly present  Cardiovascular: Normal rate, regular rhythm and normal heart sounds  No murmur heard  Pulmonary/Chest: Effort normal and breath sounds normal  No respiratory distress  She has no wheezes  Right breast exhibits no inverted nipple, no mass, no nipple discharge, no skin change and no tenderness  Left breast exhibits no inverted nipple, no mass, no nipple discharge, no skin change and no tenderness  Breasts are symmetrical    Abdominal: Soft  She exhibits no distension and no mass  There is no tenderness  Genitourinary: Vagina normal and uterus normal  Rectal exam shows no mass, no tenderness, anal tone normal and guaiac negative stool  Pelvic exam was performed with patient supine  There is no rash, tenderness or lesion on the right labia  There is no rash, tenderness or lesion on the left labia  Uterus is not deviated, not enlarged, not fixed and not tender  Cervix exhibits no motion tenderness, no discharge and no friability  Right adnexum displays no mass, no tenderness and no fullness  Left adnexum displays no mass, no tenderness and no fullness  No erythema, tenderness or bleeding in the vagina  No foreign body in the vagina  No vaginal discharge found  Genitourinary Comments: Moderate vaginal atrophy noted   Musculoskeletal: She exhibits no edema  Lymphadenopathy:     She has no cervical adenopathy       She has no axillary adenopathy  Right: No inguinal adenopathy present  Left: No inguinal adenopathy present  Neurological: She is alert  Skin: Skin is warm  She is not diaphoretic  Multiple moles - follows with derm every year   Psychiatric: She has a normal mood and affect  Her behavior is normal    Vitals reviewed

## 2020-07-02 NOTE — ASSESSMENT & PLAN NOTE
The current ASCCP guidelines were reviewed  Patient's last pap was 4/9/18 - WNL (-) ECC atrophy and therefore, due to her age and medical history a pap with HPV cotesting is no longer indicated  I emphasized the importance of an annual pelvic and breast exam  Patient ok to discontinue pap smear screening at this time

## 2020-07-06 ENCOUNTER — HOSPITAL ENCOUNTER (OUTPATIENT)
Dept: RADIOLOGY | Age: 75
Discharge: HOME | End: 2020-07-06
Payer: COMMERCIAL

## 2020-07-06 VITALS — WEIGHT: 164 LBS | HEIGHT: 63 IN | BODY MASS INDEX: 29.06 KG/M2

## 2020-07-06 DIAGNOSIS — Z12.31 ENCOUNTER FOR SCREENING MAMMOGRAM FOR MALIGNANT NEOPLASM OF BREAST: ICD-10-CM

## 2020-07-06 PROCEDURE — 77067 SCR MAMMO BI INCL CAD: CPT

## 2020-07-06 PROCEDURE — 77063 BREAST TOMOSYNTHESIS BI: CPT

## 2020-07-08 DIAGNOSIS — E03.9 HYPOTHYROIDISM, UNSPECIFIED TYPE: ICD-10-CM

## 2020-07-08 DIAGNOSIS — K21.9 GASTROESOPHAGEAL REFLUX DISEASE, ESOPHAGITIS PRESENCE NOT SPECIFIED: ICD-10-CM

## 2020-07-08 DIAGNOSIS — J30.9 CHRONIC ALLERGIC RHINITIS: ICD-10-CM

## 2020-07-09 RX ORDER — OMEPRAZOLE 40 MG/1
CAPSULE, DELAYED RELEASE ORAL
Qty: 90 CAPSULE | Refills: 1 | Status: SHIPPED | OUTPATIENT
Start: 2020-07-09 | End: 2020-08-10

## 2020-07-09 RX ORDER — MOMETASONE FUROATE 50 UG/1
SPRAY, METERED NASAL
Qty: 51 G | Refills: 1 | Status: SHIPPED | OUTPATIENT
Start: 2020-07-09 | End: 2021-01-04

## 2020-07-09 RX ORDER — LEVOTHYROXINE SODIUM 88 UG/1
TABLET ORAL
Qty: 90 TABLET | Refills: 1 | Status: SHIPPED | OUTPATIENT
Start: 2020-07-09 | End: 2021-01-04

## 2020-07-28 ENCOUNTER — APPOINTMENT (OUTPATIENT)
Dept: LAB | Facility: CLINIC | Age: 75
End: 2020-07-28
Payer: COMMERCIAL

## 2020-07-28 DIAGNOSIS — E06.3 HYPOTHYROIDISM DUE TO HASHIMOTO'S THYROIDITIS: ICD-10-CM

## 2020-07-28 DIAGNOSIS — E03.8 HYPOTHYROIDISM DUE TO HASHIMOTO'S THYROIDITIS: ICD-10-CM

## 2020-07-28 DIAGNOSIS — E78.2 MIXED HYPERLIPIDEMIA: ICD-10-CM

## 2020-07-28 LAB
ALBUMIN SERPL BCP-MCNC: 3.6 G/DL (ref 3.5–5)
ALP SERPL-CCNC: 61 U/L (ref 46–116)
ALT SERPL W P-5'-P-CCNC: 24 U/L (ref 12–78)
ANION GAP SERPL CALCULATED.3IONS-SCNC: 7 MMOL/L (ref 4–13)
AST SERPL W P-5'-P-CCNC: 16 U/L (ref 5–45)
BILIRUB SERPL-MCNC: 0.55 MG/DL (ref 0.2–1)
BUN SERPL-MCNC: 14 MG/DL (ref 5–25)
CALCIUM SERPL-MCNC: 9.1 MG/DL (ref 8.3–10.1)
CHLORIDE SERPL-SCNC: 107 MMOL/L (ref 100–108)
CHOLEST SERPL-MCNC: 213 MG/DL (ref 50–200)
CO2 SERPL-SCNC: 27 MMOL/L (ref 21–32)
CREAT SERPL-MCNC: 0.7 MG/DL (ref 0.6–1.3)
GFR SERPL CREATININE-BSD FRML MDRD: 86 ML/MIN/1.73SQ M
GLUCOSE P FAST SERPL-MCNC: 97 MG/DL (ref 65–99)
HDLC SERPL-MCNC: 76 MG/DL
LDLC SERPL CALC-MCNC: 119 MG/DL (ref 0–100)
NONHDLC SERPL-MCNC: 137 MG/DL
POTASSIUM SERPL-SCNC: 4.1 MMOL/L (ref 3.5–5.3)
PROT SERPL-MCNC: 6.8 G/DL (ref 6.4–8.2)
SODIUM SERPL-SCNC: 141 MMOL/L (ref 136–145)
TRIGL SERPL-MCNC: 88 MG/DL
TSH SERPL DL<=0.05 MIU/L-ACNC: 1.93 UIU/ML (ref 0.36–3.74)

## 2020-07-28 PROCEDURE — 80053 COMPREHEN METABOLIC PANEL: CPT

## 2020-07-28 PROCEDURE — 84443 ASSAY THYROID STIM HORMONE: CPT

## 2020-07-28 PROCEDURE — 80061 LIPID PANEL: CPT

## 2020-07-28 PROCEDURE — 36415 COLL VENOUS BLD VENIPUNCTURE: CPT

## 2020-08-10 ENCOUNTER — OFFICE VISIT (OUTPATIENT)
Dept: FAMILY MEDICINE CLINIC | Facility: CLINIC | Age: 75
End: 2020-08-10
Payer: COMMERCIAL

## 2020-08-10 VITALS
BODY MASS INDEX: 29.13 KG/M2 | TEMPERATURE: 97.6 F | HEART RATE: 76 BPM | HEIGHT: 63 IN | RESPIRATION RATE: 16 BRPM | WEIGHT: 164.4 LBS | SYSTOLIC BLOOD PRESSURE: 132 MMHG | DIASTOLIC BLOOD PRESSURE: 82 MMHG

## 2020-08-10 DIAGNOSIS — E06.3 HYPOTHYROIDISM DUE TO HASHIMOTO'S THYROIDITIS: ICD-10-CM

## 2020-08-10 DIAGNOSIS — E03.8 HYPOTHYROIDISM DUE TO HASHIMOTO'S THYROIDITIS: ICD-10-CM

## 2020-08-10 DIAGNOSIS — F41.9 ANXIETY: ICD-10-CM

## 2020-08-10 DIAGNOSIS — K21.9 GASTROESOPHAGEAL REFLUX DISEASE, ESOPHAGITIS PRESENCE NOT SPECIFIED: Primary | ICD-10-CM

## 2020-08-10 DIAGNOSIS — E78.2 MIXED HYPERLIPIDEMIA: ICD-10-CM

## 2020-08-10 PROCEDURE — 1160F RVW MEDS BY RX/DR IN RCRD: CPT | Performed by: FAMILY MEDICINE

## 2020-08-10 PROCEDURE — 3008F BODY MASS INDEX DOCD: CPT | Performed by: FAMILY MEDICINE

## 2020-08-10 PROCEDURE — 99214 OFFICE O/P EST MOD 30 MIN: CPT | Performed by: FAMILY MEDICINE

## 2020-08-10 PROCEDURE — 4040F PNEUMOC VAC/ADMIN/RCVD: CPT | Performed by: FAMILY MEDICINE

## 2020-08-10 PROCEDURE — 3725F SCREEN DEPRESSION PERFORMED: CPT | Performed by: FAMILY MEDICINE

## 2020-08-10 PROCEDURE — 1036F TOBACCO NON-USER: CPT | Performed by: FAMILY MEDICINE

## 2020-08-10 RX ORDER — OMEPRAZOLE 20 MG/1
20 CAPSULE, DELAYED RELEASE ORAL DAILY
Qty: 30 CAPSULE | Refills: 5 | Status: SHIPPED | OUTPATIENT
Start: 2020-08-10 | End: 2020-08-31 | Stop reason: SDUPTHER

## 2020-08-10 RX ORDER — LORAZEPAM 0.5 MG/1
TABLET ORAL
Qty: 15 TABLET | Refills: 0 | Status: SHIPPED | OUTPATIENT
Start: 2020-08-10 | End: 2020-11-17 | Stop reason: SDUPTHER

## 2020-08-10 NOTE — ASSESSMENT & PLAN NOTE
I reviewed with pt   to have back surgery,causing increased anxiety  Restart lorazepam 0 5mg, 1/2-1 tab q8h prn   Recheck 1m prn - otherwise recheck 6

## 2020-08-10 NOTE — PROGRESS NOTES
Assessment/Plan:    Esophageal reflux  Controlled  Pt failed wean in the past, but may be doing better with diet at present  REC: decrease omeprazole to 20mg qd  Pt to call in 1-2 weeks if symptoms worsen  Hypothyroidism  TSH stable  Cont present care  Recheck 6m    Hyperlipidemia  Controled on present care  Recheck 6m - earlier if worse    Anxiety  I reviewed with pt   to have back surgery,causing increased anxiety  Restart lorazepam 0 5mg, 1/2-1 tab q8h prn  Recheck 1m prn - otherwise recheck 6       Diagnoses and all orders for this visit:    Gastroesophageal reflux disease, esophagitis presence not specified  -     omeprazole (PriLOSEC) 20 mg delayed release capsule; Take 1 capsule (20 mg total) by mouth daily    Hypothyroidism due to Hashimoto's thyroiditis    Mixed hyperlipidemia    Anxiety  -     LORazepam (ATIVAN) 0 5 mg tablet; 1/2 - 1 tab q8h prn          Subjective:      Patient ID: Danay Davila is a 70 y o  female  f/u multiple med issues   - pt feels well  No new complaints  - pt denies CP, palp, lightheadedness or other CV symptoms with or without exertion  Pt walks 30-60min 3-5x a week  - allergies controlled  - no GI  Up to date with colonoscopy - due next year  - up to date with Gyn    - pt with sl increased anxiety ( to have back surgery)  Pt had good results with lorazepam in the past and would like to have a few to help with episodes of anxiety      The following portions of the patient's history were reviewed and updated as appropriate:   She  has a past medical history of Acid reflux, Basal cell carcinoma, Dental bridge present, Disease of thyroid gland, Hashimoto's disease, High cholesterol, Osteopenia, Seasonal allergies, Thyroid disease, and Wears glasses    She   Patient Active Problem List    Diagnosis Date Noted    Anxiety 08/10/2020    Encounter for gynecological examination (general) (routine) without abnormal findings 07/02/2020    White coat syndrome with high blood pressure without hypertension 04/12/2017    Basal cell carcinoma of skin of nose 08/16/2016    Esophageal reflux 09/26/2012    Hyperlipidemia 09/26/2012    Hypothyroidism 09/26/2012    Nontoxic single thyroid nodule 09/26/2012     She  has a past surgical history that includes Skin cancer excision; Tonsillectomy; Esophagogastroduodenoscopy; Colonoscopy; Skin biopsy; MOHS RECONSTRUCTION (N/A, 10/4/2016); pr exc skin benig >4 cm trunk,arm,leg (N/A, 3/19/2019); and pr recmpl wnd trunk 2 6-7 5 cm (N/A, 3/19/2019)  She  reports that she has quit smoking  She has a 20 00 pack-year smoking history  She has never used smokeless tobacco  She reports current alcohol use  She reports that she does not use drugs  Current Outpatient Medications   Medication Sig Dispense Refill    ascorbic acid (VITAMIN C) 500 mg tablet Take 500 mg by mouth daily   atorvastatin (LIPITOR) 10 mg tablet TAKE 1 TABLET BY MOUTH  DAILY 90 tablet 1    Biotin 31593 MCG TABS Take by mouth      Calcium Carbonate-Vit D-Min (CALTRATE 600+D PLUS MINERALS PO) Take by mouth daily   levothyroxine 88 mcg tablet TAKE 1 TABLET BY MOUTH ONCE DAILY 90 tablet 1    loratadine (CLARITIN) 10 mg tablet Take 10 mg by mouth daily      LORazepam (ATIVAN) 0 5 mg tablet 1/2 - 1 tab q8h prn 15 tablet 0    mometasone (NASONEX) 50 mcg/act nasal spray USE 2 SPRAYS IN EACH  NOSTRIL DAILY 51 g 1    Multiple Vitamins-Minerals (CENTRUM SILVER PO) Take by mouth daily   omeprazole (PriLOSEC) 20 mg delayed release capsule Take 1 capsule (20 mg total) by mouth daily 30 capsule 5     No current facility-administered medications for this visit  She is allergic to avelox [moxifloxacin] and augmentin [amoxicillin-pot clavulanate]       Review of Systems   Constitutional: Negative  HENT: Negative  Eyes: Negative  Respiratory: Negative  Cardiovascular: Negative  Gastrointestinal: Negative  Endocrine: Negative      Genitourinary: Negative  Musculoskeletal: Negative  Skin: Negative  Allergic/Immunologic: Negative  Neurological: Negative  Hematological: Negative  Psychiatric/Behavioral: Negative  All other systems reviewed and are negative  Objective:      /82   Pulse 76   Temp 97 6 °F (36 4 °C)   Resp 16   Ht 5' 3" (1 6 m)   Wt 74 6 kg (164 lb 6 4 oz)   BMI 29 12 kg/m²          Physical Exam  Constitutional:       Appearance: She is well-developed  She is not diaphoretic  HENT:      Head: Normocephalic and atraumatic  Right Ear: Tympanic membrane, ear canal and external ear normal       Left Ear: Tympanic membrane, ear canal and external ear normal       Mouth/Throat:      Pharynx: No oropharyngeal exudate  Eyes:      Extraocular Movements: Extraocular movements intact  Conjunctiva/sclera: Conjunctivae normal       Pupils: Pupils are equal, round, and reactive to light  Neck:      Musculoskeletal: Normal range of motion and neck supple  Thyroid: No thyromegaly  Vascular: No JVD  Cardiovascular:      Rate and Rhythm: Normal rate and regular rhythm  Heart sounds: No murmur  Pulmonary:      Effort: Pulmonary effort is normal       Breath sounds: Normal breath sounds  Abdominal:      General: There is no distension  Palpations: Abdomen is soft  There is no mass  Tenderness: There is no abdominal tenderness  Musculoskeletal: Normal range of motion  Lymphadenopathy:      Cervical: No cervical adenopathy  Skin:     General: Skin is warm and dry  Neurological:      General: No focal deficit present  Mental Status: She is alert and oriented to person, place, and time  Cranial Nerves: No cranial nerve deficit  Sensory: No sensory deficit  Motor: No weakness or abnormal muscle tone  Coordination: Coordination normal       Deep Tendon Reflexes: Reflexes are normal and symmetric   Reflexes normal    Psychiatric:         Mood and Affect: Mood normal

## 2020-08-10 NOTE — ASSESSMENT & PLAN NOTE
Controlled  Pt failed wean in the past, but may be doing better with diet at present  REC: decrease omeprazole to 20mg qd  Pt to call in 1-2 weeks if symptoms worsen

## 2020-08-31 DIAGNOSIS — K21.9 GASTROESOPHAGEAL REFLUX DISEASE, ESOPHAGITIS PRESENCE NOT SPECIFIED: ICD-10-CM

## 2020-08-31 RX ORDER — OMEPRAZOLE 20 MG/1
20 CAPSULE, DELAYED RELEASE ORAL DAILY
Qty: 90 CAPSULE | Refills: 1 | Status: SHIPPED | OUTPATIENT
Start: 2020-08-31 | End: 2021-01-04

## 2020-10-07 ENCOUNTER — IMMUNIZATIONS (OUTPATIENT)
Dept: FAMILY MEDICINE CLINIC | Facility: CLINIC | Age: 75
End: 2020-10-07
Payer: COMMERCIAL

## 2020-10-07 DIAGNOSIS — Z23 ENCOUNTER FOR IMMUNIZATION: ICD-10-CM

## 2020-10-07 PROCEDURE — G0008 ADMIN INFLUENZA VIRUS VAC: HCPCS

## 2020-10-07 PROCEDURE — 90662 IIV NO PRSV INCREASED AG IM: CPT

## 2020-11-17 DIAGNOSIS — F41.9 ANXIETY: ICD-10-CM

## 2020-11-18 RX ORDER — LORAZEPAM 0.5 MG/1
TABLET ORAL
Qty: 15 TABLET | Refills: 0 | Status: SHIPPED | OUTPATIENT
Start: 2020-11-18 | End: 2021-03-18 | Stop reason: SDUPTHER

## 2020-11-26 DIAGNOSIS — E78.2 MIXED HYPERLIPIDEMIA: ICD-10-CM

## 2020-11-26 RX ORDER — ATORVASTATIN CALCIUM 10 MG/1
TABLET, FILM COATED ORAL
Qty: 90 TABLET | Refills: 3 | Status: SHIPPED | OUTPATIENT
Start: 2020-11-26 | End: 2021-10-04

## 2021-01-04 DIAGNOSIS — E03.9 HYPOTHYROIDISM, UNSPECIFIED TYPE: ICD-10-CM

## 2021-01-04 DIAGNOSIS — J30.9 CHRONIC ALLERGIC RHINITIS: ICD-10-CM

## 2021-01-04 DIAGNOSIS — K21.9 GASTROESOPHAGEAL REFLUX DISEASE: ICD-10-CM

## 2021-01-04 RX ORDER — MOMETASONE FUROATE 50 UG/1
SPRAY, METERED NASAL
Qty: 51 G | Refills: 1 | Status: SHIPPED | OUTPATIENT
Start: 2021-01-04 | End: 2021-04-15

## 2021-01-04 RX ORDER — OMEPRAZOLE 20 MG/1
CAPSULE, DELAYED RELEASE ORAL
Qty: 90 CAPSULE | Refills: 3 | Status: SHIPPED | OUTPATIENT
Start: 2021-01-04 | End: 2021-03-24

## 2021-01-04 RX ORDER — LEVOTHYROXINE SODIUM 88 UG/1
TABLET ORAL
Qty: 90 TABLET | Refills: 3 | Status: SHIPPED | OUTPATIENT
Start: 2021-01-04 | End: 2021-12-21

## 2021-01-12 ENCOUNTER — TELEMEDICINE (OUTPATIENT)
Dept: FAMILY MEDICINE CLINIC | Facility: CLINIC | Age: 76
End: 2021-01-12
Payer: COMMERCIAL

## 2021-01-12 DIAGNOSIS — J01.10 ACUTE NON-RECURRENT FRONTAL SINUSITIS: Primary | ICD-10-CM

## 2021-01-12 PROCEDURE — 1036F TOBACCO NON-USER: CPT | Performed by: FAMILY MEDICINE

## 2021-01-12 PROCEDURE — 99213 OFFICE O/P EST LOW 20 MIN: CPT | Performed by: FAMILY MEDICINE

## 2021-01-12 PROCEDURE — 1160F RVW MEDS BY RX/DR IN RCRD: CPT | Performed by: FAMILY MEDICINE

## 2021-01-12 RX ORDER — AMOXICILLIN 875 MG/1
875 TABLET, COATED ORAL 2 TIMES DAILY
Qty: 20 TABLET | Refills: 0 | Status: SHIPPED | OUTPATIENT
Start: 2021-01-12 | End: 2021-01-22

## 2021-01-12 NOTE — PROGRESS NOTES
Virtual Regular Visit      Assessment/Plan:    Problem List Items Addressed This Visit     None      Visit Diagnoses     Acute non-recurrent frontal sinusitis    -  Primary    Continue Nasonex  Add amoxicillin 875 b i d  Recheck Friday if not improved-earlier if worse  Relevant Medications    amoxicillin (AMOXIL) 875 mg tablet               Reason for visit is   Chief Complaint   Patient presents with    Virtual Regular Visit        Encounter provider Cheng Ferreira MD    Provider located at [address] [place] [address] 38 [address] [facility]      Recent Visits  No visits were found meeting these conditions  Showing recent visits within past 7 days and meeting all other requirements     Today's Visits  Date Type Provider Dept   01/12/21 Telemedicine Cheng Ferreira, [address] [place] [address] today's visits and meeting all other requirements     Future Appointments  No visits were found meeting these conditions  Showing future appointments within next 150 days and meeting all other requirements        The patient was identified by name and date of birth  Milena Cruz was informed that this is a telemedicine visit and that the visit is being conducted through [employer] and patient was informed that this is not a secure, HIPAA-compliant platform  She agrees to proceed     My office door was closed  No one else was in the room  She acknowledged consent and understanding of privacy and security of the video platform  The patient has agreed to participate and understands they can discontinue the visit at any time  Patient is aware this is a billable service  Subjective  Milena Cruz is a 70 y o  female    37-year-old female with a 4 to five-day history of increased frontal sinus pressure and purulent nasal discharge presents for video visit    She denies any fever, chills, nausea/vomiting/diarrhea, headache, body aches, or other COVID symptoms  Patient states that her symptoms are typical for her average sinus infection   is not sick  She denies any known COVID exposure  Past Medical History:   Diagnosis Date    Acid reflux     Basal cell carcinoma     basal cell on back and nose    Dental bridge present     upper    Disease of thyroid gland     Hashimoto's disease     High cholesterol     Osteopenia     Seasonal allergies     mold,pollen    Thyroid disease     thyroid nodules    Wears glasses        Past Surgical History:   Procedure Laterality Date    COLONOSCOPY      ESOPHAGOGASTRODUODENOSCOPY      MOHS RECONSTRUCTION N/A 10/4/2016    Procedure: RECONSTRUCTION MOHS DEFECT NASAL TIP WITH FLAP;  Surgeon: Andrew Alcala MD;  [facility];  Service:     AR EXC SKIN BENIG >4 CM TRUNK,ARM,LEG N/A 3/19/2019    Procedure: MID UPPER BACK LIPOMA EXCISION;  Surgeon: Andrew Alcala MD;  [facility];  Service: Plastics    AR RECMPL WND TRUNK 2 6-7 5 CM N/A 3/19/2019    Procedure: COMPLEX CLOSURE;  Surgeon: Andrew Alcala MD;  [facility];  Service: Plastics    SKIN BIOPSY      SKIN CANCER EXCISION      basal cell CA on back and nose-2009 and 2016    TONSILLECTOMY         Current Outpatient Medications   Medication Sig Dispense Refill    amoxicillin (AMOXIL) 875 mg tablet Take 1 tablet (875 mg total) by mouth 2 (two) times a day for 10 days 20 tablet 0    ascorbic acid (VITAMIN C) 500 mg tablet Take 500 mg by mouth daily   atorvastatin (LIPITOR) 10 mg tablet TAKE 1 TABLET BY MOUTH  DAILY 90 tablet 3    Biotin 15864 MCG TABS Take by mouth      Calcium Carbonate-Vit D-Min (CALTRATE 600+D PLUS MINERALS PO) Take by mouth daily        levothyroxine 88 mcg tablet TAKE 1 TABLET BY MOUTH  DAILY 90 tablet 3    loratadine (CLARITIN) 10 mg tablet Take 10 mg by mouth daily      LORazepam (ATIVAN) 0 5 mg tablet 1/2 - 1 tab q8h prn 15 tablet 0    mometasone (NASONEX) 50 mcg/act nasal spray USE 2 SPRAYS IN BOTH  NOSTRILS DAILY 51 g 1    Multiple Vitamins-Minerals (CENTRUM SILVER PO) Take by mouth daily   omeprazole (PriLOSEC) 20 mg delayed release capsule TAKE 1 CAPSULE BY MOUTH  DAILY 90 capsule 3     No current facility-administered medications for this visit  Allergies   Allergen Reactions    Avelox [Moxifloxacin] Hives    Augmentin [Amoxicillin-Pot Clavulanate] Rash       Review of Systems   Constitutional: Positive for fatigue  Negative for activity change, appetite change, chills and fever  HENT: Positive for postnasal drip, rhinorrhea and sinus pain  Negative for ear pain, facial swelling, hearing loss and sore throat  Eyes: Negative for pain, discharge, redness and itching  Respiratory: Positive for cough  Cardiovascular: Negative for chest pain  Gastrointestinal: Negative for nausea  Neurological: Negative for headaches  Video Exam    There were no vitals filed for this visit  Physical Exam  Constitutional:       Comments: Mildly ill-appearing female in no apparent distress   HENT:      Head: Normocephalic  Nose: Congestion (Nose with mild congestion  Frontal sinuses tender to self palpation  Increased discomfort with front head tilt) present  Mouth/Throat:      Mouth: Mucous membranes are moist    Eyes:      Conjunctiva/sclera: Conjunctivae normal    Neck:      Musculoskeletal: Normal range of motion  Pulmonary:      Effort: Pulmonary effort is normal  No respiratory distress  Neurological:      Mental Status: She is alert and oriented to person, place, and time  I spent 8 minutes directly with the patient during this visit      VIRTUAL VISIT Maribel Joe 81 acknowledges that she has consented to an online visit or consultation   She understands that the online visit is based solely on information provided by her, and that, in the absence of a face-to-face physical evaluation by the physician, the diagnosis she receives is both limited and provisional in terms of accuracy and completeness  This is not intended to replace a full medical face-to-face evaluation by the physician  Lucia Corbin understands and accepts these terms

## 2021-02-13 DIAGNOSIS — Z23 ENCOUNTER FOR IMMUNIZATION: ICD-10-CM

## 2021-02-15 ENCOUNTER — OFFICE VISIT (OUTPATIENT)
Dept: FAMILY MEDICINE CLINIC | Facility: CLINIC | Age: 76
End: 2021-02-15
Payer: COMMERCIAL

## 2021-02-15 VITALS
WEIGHT: 162 LBS | HEIGHT: 63 IN | DIASTOLIC BLOOD PRESSURE: 80 MMHG | TEMPERATURE: 98.2 F | HEART RATE: 72 BPM | BODY MASS INDEX: 28.7 KG/M2 | SYSTOLIC BLOOD PRESSURE: 122 MMHG

## 2021-02-15 DIAGNOSIS — E03.8 HYPOTHYROIDISM DUE TO HASHIMOTO'S THYROIDITIS: ICD-10-CM

## 2021-02-15 DIAGNOSIS — E78.2 MIXED HYPERLIPIDEMIA: ICD-10-CM

## 2021-02-15 DIAGNOSIS — K21.9 GASTROESOPHAGEAL REFLUX DISEASE, UNSPECIFIED WHETHER ESOPHAGITIS PRESENT: ICD-10-CM

## 2021-02-15 DIAGNOSIS — E06.3 HYPOTHYROIDISM DUE TO HASHIMOTO'S THYROIDITIS: ICD-10-CM

## 2021-02-15 DIAGNOSIS — Z00.00 MEDICARE ANNUAL WELLNESS VISIT, SUBSEQUENT: Primary | ICD-10-CM

## 2021-02-15 DIAGNOSIS — F41.9 ANXIETY: ICD-10-CM

## 2021-02-15 DIAGNOSIS — Z78.0 ASYMPTOMATIC POSTMENOPAUSAL STATE: ICD-10-CM

## 2021-02-15 PROCEDURE — 1036F TOBACCO NON-USER: CPT | Performed by: FAMILY MEDICINE

## 2021-02-15 PROCEDURE — 1125F AMNT PAIN NOTED PAIN PRSNT: CPT | Performed by: FAMILY MEDICINE

## 2021-02-15 PROCEDURE — G0439 PPPS, SUBSEQ VISIT: HCPCS | Performed by: FAMILY MEDICINE

## 2021-02-15 PROCEDURE — 3008F BODY MASS INDEX DOCD: CPT | Performed by: FAMILY MEDICINE

## 2021-02-15 PROCEDURE — 1170F FXNL STATUS ASSESSED: CPT | Performed by: FAMILY MEDICINE

## 2021-02-15 PROCEDURE — 99214 OFFICE O/P EST MOD 30 MIN: CPT | Performed by: FAMILY MEDICINE

## 2021-02-15 PROCEDURE — 3288F FALL RISK ASSESSMENT DOCD: CPT | Performed by: FAMILY MEDICINE

## 2021-02-15 PROCEDURE — 1160F RVW MEDS BY RX/DR IN RCRD: CPT | Performed by: FAMILY MEDICINE

## 2021-02-15 PROCEDURE — 3725F SCREEN DEPRESSION PERFORMED: CPT | Performed by: FAMILY MEDICINE

## 2021-02-15 NOTE — PATIENT INSTRUCTIONS
Medicare Preventive Visit Patient Instructions  Thank you for completing your Welcome to Medicare Visit or Medicare Annual Wellness Visit today  Your next wellness visit will be due in one year (2/15/2022)  The screening/preventive services that you may require over the next 5-10 years are detailed below  Some tests may not apply to you based off risk factors and/or age  Screening tests ordered at today's visit but not completed yet may show as past due  Also, please note that scanned in results may not display below  Preventive Screenings:  Service Recommendations Previous Testing/Comments   Colorectal Cancer Screening  * Colonoscopy    * Fecal Occult Blood Test (FOBT)/Fecal Immunochemical Test (FIT)  * Fecal DNA/Cologuard Test  * Flexible Sigmoidoscopy Age: 54-65 years old   Colonoscopy: every 10 years (may be performed more frequently if at higher risk)  OR  FOBT/FIT: every 1 year  OR  Cologuard: every 3 years  OR  Sigmoidoscopy: every 5 years  Screening may be recommended earlier than age 48 if at higher risk for colorectal cancer  Also, an individualized decision between you and your healthcare provider will decide whether screening between the ages of 74-80 would be appropriate  Colonoscopy: 07/17/2018  FOBT/FIT: Not on file  Cologuard: Not on file  Sigmoidoscopy: Not on file         Breast Cancer Screening Age: 36 years old  Frequency: every 1-2 years  Not required if history of left and right mastectomy Mammogram: 07/06/2020       Cervical Cancer Screening Between the ages of 21-29, pap smear recommended once every 3 years  Between the ages of 33-67, can perform pap smear with HPV co-testing every 5 years     Recommendations may differ for women with a history of total hysterectomy, cervical cancer, or abnormal pap smears in past  Pap Smear: 07/02/2020       Hepatitis C Screening Once for adults born between 1945 and 1965  More frequently in patients at high risk for Hepatitis C Hep C Antibody: 01/23/2019       Diabetes Screening 1-2 times per year if you're at risk for diabetes or have pre-diabetes Fasting glucose: 97 mg/dL   A1C: 5 6 %       Cholesterol Screening Once every 5 years if you don't have a lipid disorder  May order more often based on risk factors  Lipid panel: 07/28/2020         Other Preventive Screenings Covered by Medicare:  1  Abdominal Aortic Aneurysm (AAA) Screening: covered once if your at risk  You're considered to be at risk if you have a family history of AAA  2  Lung Cancer Screening: covers low dose CT scan once per year if you meet all of the following conditions: (1) Age 50-69; (2) No signs or symptoms of lung cancer; (3) Current smoker or have quit smoking within the last 15 years; (4) You have a tobacco smoking history of at least 30 pack years (packs per day multiplied by number of years you smoked); (5) You get a written order from a healthcare provider  3  Glaucoma Screening: covered annually if you're considered high risk: (1) You have diabetes OR (2) Family history of glaucoma OR (3)  aged 48 and older OR (3)  American aged 72 and older  3  Osteoporosis Screening: covered every 2 years if you meet one of the following conditions: (1) You're estrogen deficient and at risk for osteoporosis based off medical history and other findings; (2) Have a vertebral abnormality; (3) On glucocorticoid therapy for more than 3 months; (4) Have primary hyperparathyroidism; (5) On osteoporosis medications and need to assess response to drug therapy  · Last bone density test (DXA Scan): 05/04/2017  5  HIV Screening: covered annually if you're between the age of 12-76  Also covered annually if you are younger than 13 and older than 72 with risk factors for HIV infection  For pregnant patients, it is covered up to 3 times per pregnancy      Immunizations:  Immunization Recommendations   Influenza Vaccine Annual influenza vaccination during flu season is recommended for all persons aged >= 6 months who do not have contraindications   Pneumococcal Vaccine (Prevnar and Pneumovax)  * Prevnar = PCV13  * Pneumovax = PPSV23   Adults 25-60 years old: 1-3 doses may be recommended based on certain risk factors  Adults 72 years old: Prevnar (PCV13) vaccine recommended followed by Pneumovax (PPSV23) vaccine  If already received PPSV23 since turning 65, then PCV13 recommended at least one year after PPSV23 dose  Hepatitis B Vaccine 3 dose series if at intermediate or high risk (ex: diabetes, end stage renal disease, liver disease)   Tetanus (Td) Vaccine - COST NOT COVERED BY MEDICARE PART B Following completion of primary series, a booster dose should be given every 10 years to maintain immunity against tetanus  Td may also be given as tetanus wound prophylaxis  Tdap Vaccine - COST NOT COVERED BY MEDICARE PART B Recommended at least once for all adults  For pregnant patients, recommended with each pregnancy  Shingles Vaccine (Shingrix) - COST NOT COVERED BY MEDICARE PART B  2 shot series recommended in those aged 48 and above     Health Maintenance Due:      Topic Date Due    DXA SCAN  05/04/2020    Cervical Cancer Screening  04/09/2021    MAMMOGRAM  07/06/2021    Colorectal Cancer Screening  07/17/2028    Hepatitis C Screening  Completed     Immunizations Due:      Topic Date Due    DTaP,Tdap,and Td Vaccines (1 - Tdap) 05/[birth year]/1968     Advance Directives   What are advance directives? Advance directives are legal documents that state your wishes and plans for medical care  These plans are made ahead of time in case you lose your ability to make decisions for yourself  Advance directives can apply to any medical decision, such as the treatments you want, and if you want to donate organs  What are the types of advance directives? There are many types of advance directives, and each state has rules about how to use them   You may choose a combination of any of the following:  · Living will: This is a written record of the treatment you want  You can also choose which treatments you do not want, which to limit, and which to stop at a certain time  This includes surgery, medicine, IV fluid, and tube feedings  · Durable power of  for healthcare [place] [facility]): This is a written record that states who you want to make healthcare choices for you when you are unable to make them for yourself  This person, called a proxy, is usually a family member or a friend  You may choose more than 1 proxy  · Do not resuscitate (DNR) order:  A DNR order is used in case your heart stops beating or you stop breathing  It is a request not to have certain forms of treatment, such as CPR  A DNR order may be included in other types of advance directives  · Medical directive: This covers the care that you want if you are in a coma, near death, or unable to make decisions for yourself  You can list the treatments you want for each condition  Treatment may include pain medicine, surgery, blood transfusions, dialysis, IV or tube feedings, and a ventilator (breathing machine)  · Values history: This document has questions about your views, beliefs, and how you feel and think about life  This information can help others choose the care that you would choose  Why are advance directives important? An advance directive helps you control your care  Although spoken wishes may be used, it is better to have your wishes written down  Spoken wishes can be misunderstood, or not followed  Treatments may be given even if you do not want them  An advance directive may make it easier for your family to make difficult choices about your care  Weight Management   Why it is important to manage your weight:  Being overweight increases your risk of health conditions such as heart disease, high blood pressure, type 2 diabetes, and certain types of cancer   It can also increase your risk for osteoarthritis, sleep apnea, and other respiratory problems  Aim for a slow, steady weight loss  Even a small amount of weight loss can lower your risk of health problems  How to lose weight safely:  A safe and healthy way to lose weight is to eat fewer calories and get regular exercise  You can lose up about 1 pound a week by decreasing the number of calories you eat by 500 calories each day  Healthy meal plan for weight management:  A healthy meal plan includes a variety of foods, contains fewer calories, and helps you stay healthy  A healthy meal plan includes the following:  · Eat whole-grain foods more often  A healthy meal plan should contain fiber  Fiber is the part of grains, fruits, and vegetables that is not broken down by your body  Whole-grain foods are healthy and provide extra fiber in your diet  Some examples of whole-grain foods are whole-wheat breads and pastas, oatmeal, brown rice, and bulgur  · Eat a variety of vegetables every day  Include dark, leafy greens such as spinach, kale, yannick greens, and mustard greens  Eat yellow and orange vegetables such as carrots, sweet potatoes, and winter squash  · Eat a variety of fruits every day  Choose fresh or canned fruit (canned in its own juice or light syrup) instead of juice  Fruit juice has very little or no fiber  · Eat low-fat dairy foods  Drink fat-free (skim) milk or 1% milk  Eat fat-free yogurt and low-fat cottage cheese  Try low-fat cheeses such as mozzarella and other reduced-fat cheeses  · Choose meat and other protein foods that are low in fat  Choose beans or other legumes such as split peas or lentils  Choose fish, skinless poultry (chicken or turkey), or lean cuts of red meat (beef or pork)  Before you cook meat or poultry, cut off any visible fat  · Use less fat and oil  Try baking foods instead of frying them  Add less fat, such as margarine, sour cream, regular salad dressing and mayonnaise to foods  Eat fewer high-fat foods   Some examples of high-fat foods include french fries, doughnuts, ice cream, and cakes  · Eat fewer sweets  Limit foods and drinks that are high in sugar  This includes candy, cookies, regular soda, and sweetened drinks  Exercise:  Exercise at least 30 minutes per day on most days of the week  Some examples of exercise include walking, biking, dancing, and swimming  You can also fit in more physical activity by taking the stairs instead of the elevator or parking farther away from stores  Ask your healthcare provider about the best exercise plan for you  © Copyright [employer] 2018 Information is for End User's use only and may not be sold, redistributed or otherwise used for commercial purposes  All illustrations and images included in CareNotes® are the copyrighted property of [employer]  or [facility] Preventive Visit Patient Instructions  Thank you for completing your Welcome to Medicare Visit or Medicare Annual Wellness Visit today  Your next wellness visit will be due in one year (2/15/2022)  The screening/preventive services that you may require over the next 5-10 years are detailed below  Some tests may not apply to you based off risk factors and/or age  Screening tests ordered at today's visit but not completed yet may show as past due  Also, please note that scanned in results may not display below  Preventive Screenings:  Service Recommendations Previous Testing/Comments   Colorectal Cancer Screening  * Colonoscopy    * Fecal Occult Blood Test (FOBT)/Fecal Immunochemical Test (FIT)  * Fecal DNA/Cologuard Test  * Flexible Sigmoidoscopy Age: 54-65 years old   Colonoscopy: every 10 years (may be performed more frequently if at higher risk)  OR  FOBT/FIT: every 1 year  OR  Cologuard: every 3 years  OR  Sigmoidoscopy: every 5 years  Screening may be recommended earlier than age 48 if at higher risk for colorectal cancer   Also, an individualized decision between you and your healthcare provider will decide whether screening between the ages of 74-80 would be appropriate  Colonoscopy: 07/17/2018  FOBT/FIT: Not on file  Cologuard: Not on file  Sigmoidoscopy: Not on file         Breast Cancer Screening Age: 36 years old  Frequency: every 1-2 years  Not required if history of left and right mastectomy Mammogram: 07/06/2020       Cervical Cancer Screening Between the ages of 21-29, pap smear recommended once every 3 years  Between the ages of 33-67, can perform pap smear with HPV co-testing every 5 years  Recommendations may differ for women with a history of total hysterectomy, cervical cancer, or abnormal pap smears in past  Pap Smear: 07/02/2020       Hepatitis C Screening Once for adults born between 1945 and 1965  More frequently in patients at high risk for Hepatitis C Hep C Antibody: 01/23/2019       Diabetes Screening 1-2 times per year if you're at risk for diabetes or have pre-diabetes Fasting glucose: 97 mg/dL   A1C: 5 6 %       Cholesterol Screening Once every 5 years if you don't have a lipid disorder  May order more often based on risk factors  Lipid panel: 07/28/2020         Other Preventive Screenings Covered by Medicare:  6  Abdominal Aortic Aneurysm (AAA) Screening: covered once if your at risk  You're considered to be at risk if you have a family history of AAA  7  Lung Cancer Screening: covers low dose CT scan once per year if you meet all of the following conditions: (1) Age 50-69; (2) No signs or symptoms of lung cancer; (3) Current smoker or have quit smoking within the last 15 years; (4) You have a tobacco smoking history of at least 30 pack years (packs per day multiplied by number of years you smoked); (5) You get a written order from a healthcare provider    8  Glaucoma Screening: covered annually if you're considered high risk: (1) You have diabetes OR (2) Family history of glaucoma OR (3)  aged 48 and older OR (3)  American aged 72 and older  9  Osteoporosis Screening: covered every 2 years if you meet one of the following conditions: (1) You're estrogen deficient and at risk for osteoporosis based off medical history and other findings; (2) Have a vertebral abnormality; (3) On glucocorticoid therapy for more than 3 months; (4) Have primary hyperparathyroidism; (5) On osteoporosis medications and need to assess response to drug therapy  · Last bone density test (DXA Scan): 05/04/2017   10  HIV Screening: covered annually if you're between the age of 15-65  Also covered annually if you are younger than 13 and older than 72 with risk factors for HIV infection  For pregnant patients, it is covered up to 3 times per pregnancy  Immunizations:  Immunization Recommendations   Influenza Vaccine Annual influenza vaccination during flu season is recommended for all persons aged >= 6 months who do not have contraindications   Pneumococcal Vaccine (Prevnar and Pneumovax)  * Prevnar = PCV13  * Pneumovax = PPSV23   Adults 25-60 years old: 1-3 doses may be recommended based on certain risk factors  Adults 72 years old: Prevnar (PCV13) vaccine recommended followed by Pneumovax (PPSV23) vaccine  If already received PPSV23 since turning 65, then PCV13 recommended at least one year after PPSV23 dose  Hepatitis B Vaccine 3 dose series if at intermediate or high risk (ex: diabetes, end stage renal disease, liver disease)   Tetanus (Td) Vaccine - COST NOT COVERED BY MEDICARE PART B Following completion of primary series, a booster dose should be given every 10 years to maintain immunity against tetanus  Td may also be given as tetanus wound prophylaxis  Tdap Vaccine - COST NOT COVERED BY MEDICARE PART B Recommended at least once for all adults  For pregnant patients, recommended with each pregnancy     Shingles Vaccine (Shingrix) - COST NOT COVERED BY MEDICARE PART B  2 shot series recommended in those aged 48 and above     Health Maintenance Due:      Topic Date Due    DXA SCAN  05/04/2020    Cervical Cancer Screening  04/09/2021    MAMMOGRAM  07/06/2021    Colorectal Cancer Screening  07/17/2028    Hepatitis C Screening  Completed     Immunizations Due:      Topic Date Due    DTaP,Tdap,and Td Vaccines (1 - Tdap) 05/28/1968     Advance Directives   What are advance directives? Advance directives are legal documents that state your wishes and plans for medical care  These plans are made ahead of time in case you lose your ability to make decisions for yourself  Advance directives can apply to any medical decision, such as the treatments you want, and if you want to donate organs  What are the types of advance directives? There are many types of advance directives, and each state has rules about how to use them  You may choose a combination of any of the following:  · Living will: This is a written record of the treatment you want  You can also choose which treatments you do not want, which to limit, and which to stop at a certain time  This includes surgery, medicine, IV fluid, and tube feedings  · Durable power of  for healthcare [place] [facility]): This is a written record that states who you want to make healthcare choices for you when you are unable to make them for yourself  This person, called a proxy, is usually a family member or a friend  You may choose more than 1 proxy  · Do not resuscitate (DNR) order:  A DNR order is used in case your heart stops beating or you stop breathing  It is a request not to have certain forms of treatment, such as CPR  A DNR order may be included in other types of advance directives  · Medical directive: This covers the care that you want if you are in a coma, near death, or unable to make decisions for yourself  You can list the treatments you want for each condition  Treatment may include pain medicine, surgery, blood transfusions, dialysis, IV or tube feedings, and a ventilator (breathing machine)    · Values history: This document has questions about your views, beliefs, and how you feel and think about life  This information can help others choose the care that you would choose  Why are advance directives important? An advance directive helps you control your care  Although spoken wishes may be used, it is better to have your wishes written down  Spoken wishes can be misunderstood, or not followed  Treatments may be given even if you do not want them  An advance directive may make it easier for your family to make difficult choices about your care  Weight Management   Why it is important to manage your weight:  Being overweight increases your risk of health conditions such as heart disease, high blood pressure, type 2 diabetes, and certain types of cancer  It can also increase your risk for osteoarthritis, sleep apnea, and other respiratory problems  Aim for a slow, steady weight loss  Even a small amount of weight loss can lower your risk of health problems  How to lose weight safely:  A safe and healthy way to lose weight is to eat fewer calories and get regular exercise  You can lose up about 1 pound a week by decreasing the number of calories you eat by 500 calories each day  Healthy meal plan for weight management:  A healthy meal plan includes a variety of foods, contains fewer calories, and helps you stay healthy  A healthy meal plan includes the following:  · Eat whole-grain foods more often  A healthy meal plan should contain fiber  Fiber is the part of grains, fruits, and vegetables that is not broken down by your body  Whole-grain foods are healthy and provide extra fiber in your diet  Some examples of whole-grain foods are whole-wheat breads and pastas, oatmeal, brown rice, and bulgur  · Eat a variety of vegetables every day  Include dark, leafy greens such as spinach, kale, yannick greens, and mustard greens  Eat yellow and orange vegetables such as carrots, sweet potatoes, and winter squash  · Eat a variety of fruits every day  Choose fresh or canned fruit (canned in its own juice or light syrup) instead of juice  Fruit juice has very little or no fiber  · Eat low-fat dairy foods  Drink fat-free (skim) milk or 1% milk  Eat fat-free yogurt and low-fat cottage cheese  Try low-fat cheeses such as mozzarella and other reduced-fat cheeses  · Choose meat and other protein foods that are low in fat  Choose beans or other legumes such as split peas or lentils  Choose fish, skinless poultry (chicken or turkey), or lean cuts of red meat (beef or pork)  Before you cook meat or poultry, cut off any visible fat  · Use less fat and oil  Try baking foods instead of frying them  Add less fat, such as margarine, sour cream, regular salad dressing and mayonnaise to foods  Eat fewer high-fat foods  Some examples of high-fat foods include french fries, doughnuts, ice cream, and cakes  · Eat fewer sweets  Limit foods and drinks that are high in sugar  This includes candy, cookies, regular soda, and sweetened drinks  Exercise:  Exercise at least 30 minutes per day on most days of the week  Some examples of exercise include walking, biking, dancing, and swimming  You can also fit in more physical activity by taking the stairs instead of the elevator or parking farther away from stores  Ask your healthcare provider about the best exercise plan for you  © Copyright [employer] 2018 Information is for End User's use only and may not be sold, redistributed or otherwise used for commercial purposes   All illustrations and images included in CareNotes® are the copyrighted property of [employer]  or [address] [place] [address]

## 2021-02-15 NOTE — PROGRESS NOTES
Assessment/Plan:    Esophageal reflux  Stable on omeprazole 20mg qd  Decrease to 10mg daily in March then continue to wean if doing well with goal of changing to Pepcid or similar  F/u 6m    Hypothyroidism  Appears to be stable clinically  Check TSH  Adjust meds if TSH is not at goal  Recheck 6m    Hyperlipidemia  Check labs  Continue atorvastatin  Recheck 6m    Anxiety  Situational  Stable at present  Continue to monitor  Recheck 6m - earlier if needed  Diagnoses and all orders for this visit:    Medicare annual wellness visit, subsequent    Gastroesophageal reflux disease, unspecified whether esophagitis present  -     CBC and differential; Future    Hypothyroidism due to Hashimoto's thyroiditis  -     TSH, 3rd generation; Future    Mixed hyperlipidemia  -     Comprehensive metabolic panel; Future  -     Lipid panel; Future    Anxiety  -     CBC and differential; Future    Asymptomatic postmenopausal state  -     DXA bone density spine hip and pelvis; Future          Subjective:      Patient ID: Betty Szymanski is a 70 y o  female  f/u multiple med issues and AWV  - pt feels well  - GERD seems to be seasonal, and may be worse when her sinuses act up  Pt notes that since treating her sinuses, her GERD is much better  She is now on omeprazole 20mg qd  - pt was walking until the recent snow storms  Pt denies CP, palp, lightheadedness or other CV symptoms with or without exertion  - no GI  Due for colonoscopy this summer  - up to date with Gyn  To have mammo in July  - anxiety better  Mood stable  The following portions of the patient's history were reviewed and updated as appropriate:   She  has a past medical history of Acid reflux, Basal cell carcinoma, Dental bridge present, Disease of thyroid gland, Hashimoto's disease, High cholesterol, Osteopenia, Seasonal allergies, Thyroid disease, and Wears glasses    She   Patient Active Problem List    Diagnosis Date Noted    Anxiety 08/10/2020  Encounter for gynecological examination (general) (routine) without abnormal findings 07/02/2020    White coat syndrome with high blood pressure without hypertension 04/12/2017    Basal cell carcinoma of skin of nose 08/16/2016    Esophageal reflux 09/26/2012    Hyperlipidemia 09/26/2012    Hypothyroidism 09/26/2012    Nontoxic single thyroid nodule 09/26/2012     She  has a past surgical history that includes Skin cancer excision; Tonsillectomy; Esophagogastroduodenoscopy; Colonoscopy; Skin biopsy; MOHS RECONSTRUCTION (N/A, 10/4/2016); pr exc skin benig >4 cm trunk,arm,leg (N/A, 3/19/2019); and pr recmpl wnd trunk 2 6-7 5 cm (N/A, 3/19/2019)  She  reports that she has quit smoking  She has a 20 00 pack-year smoking history  She has never used smokeless tobacco  She reports current alcohol use  She reports that she does not use drugs  Current Outpatient Medications   Medication Sig Dispense Refill    ascorbic acid (VITAMIN C) 500 mg tablet Take 500 mg by mouth daily   atorvastatin (LIPITOR) 10 mg tablet TAKE 1 TABLET BY MOUTH  DAILY 90 tablet 3    Biotin 00938 MCG TABS Take by mouth      Calcium Carbonate-Vit D-Min (CALTRATE 600+D PLUS MINERALS PO) Take by mouth daily   levothyroxine 88 mcg tablet TAKE 1 TABLET BY MOUTH  DAILY 90 tablet 3    loratadine (CLARITIN) 10 mg tablet Take 10 mg by mouth daily      LORazepam (ATIVAN) 0 5 mg tablet 1/2 - 1 tab q8h prn 15 tablet 0    mometasone (NASONEX) 50 mcg/act nasal spray USE 2 SPRAYS IN BOTH  NOSTRILS DAILY 51 g 1    Multiple Vitamins-Minerals (CENTRUM SILVER PO) Take by mouth daily   omeprazole (PriLOSEC) 20 mg delayed release capsule TAKE 1 CAPSULE BY MOUTH  DAILY 90 capsule 3     No current facility-administered medications for this visit  She is allergic to avelox [moxifloxacin] and augmentin [amoxicillin-pot clavulanate]       Review of Systems   Constitutional: Negative  Negative for chills and fever  HENT: Negative  Negative for ear pain and sore throat  Eyes: Negative  Negative for pain and visual disturbance  Respiratory: Negative  Negative for cough and shortness of breath  Cardiovascular: Negative  Negative for chest pain and palpitations  Gastrointestinal: Negative  Negative for abdominal pain and vomiting  Endocrine: Negative  Genitourinary: Negative  Negative for dysuria and hematuria  Musculoskeletal: Negative  Negative for arthralgias and back pain  Skin: Negative  Negative for color change and rash  Allergic/Immunologic: Negative  Neurological: Negative  Negative for seizures and syncope  Hematological: Negative  Psychiatric/Behavioral: Negative  All other systems reviewed and are negative  Objective:      /80   Pulse 72   Temp 98 2 °F (36 8 °C)   Ht 5' 3" (1 6 m)   Wt 73 5 kg (162 lb)   BMI 28 70 kg/m²          Physical Exam  Vitals signs reviewed  Constitutional:       Appearance: She is well-developed  She is not diaphoretic  HENT:      Head: Normocephalic and atraumatic  Right Ear: Tympanic membrane, ear canal and external ear normal       Left Ear: Tympanic membrane, ear canal and external ear normal    Eyes:      Extraocular Movements: Extraocular movements intact  Conjunctiva/sclera: Conjunctivae normal       Pupils: Pupils are equal, round, and reactive to light  Neck:      Musculoskeletal: Normal range of motion and neck supple  No muscular tenderness  Thyroid: No thyromegaly  Vascular: No JVD  Cardiovascular:      Rate and Rhythm: Normal rate and regular rhythm  Pulses: Normal pulses  Heart sounds: No murmur  Pulmonary:      Effort: Pulmonary effort is normal       Breath sounds: Normal breath sounds  Abdominal:      General: There is no distension  Palpations: Abdomen is soft  There is no mass  Tenderness: There is no abdominal tenderness  Musculoskeletal: Normal range of motion           General: No swelling or tenderness  Right lower leg: No edema  Left lower leg: No edema  Lymphadenopathy:      Cervical: No cervical adenopathy  Skin:     General: Skin is warm and dry  Capillary Refill: Capillary refill takes less than 2 seconds  Neurological:      Mental Status: She is alert and oriented to person, place, and time  Cranial Nerves: No cranial nerve deficit  Sensory: No sensory deficit  Motor: No weakness or abnormal muscle tone  Coordination: Coordination normal       Gait: Gait normal       Deep Tendon Reflexes: Reflexes are normal and symmetric  Reflexes normal       Comments: minicog 5/5   Psychiatric:         Mood and Affect: Mood normal          Behavior: Behavior normal          Thought Content:  Thought content normal          Judgment: Judgment normal       Comments: PHQ-9 Depression Screening    PHQ-9:   Frequency of the following problems over the past two weeks:      Little interest or pleasure in doing things: 0 - not at all  Feeling down, depressed, or hopeless: 0 - not at all

## 2021-02-15 NOTE — ASSESSMENT & PLAN NOTE
Stable on omeprazole 20mg qd  Decrease to 10mg daily in March then continue to wean if doing well with goal of changing to Pepcid or similar   F/u 6m

## 2021-02-15 NOTE — PROGRESS NOTES
Assessment and Plan:     Problem List Items Addressed This Visit        Digestive    Esophageal reflux     Stable on omeprazole 20mg qd  Decrease to 10mg daily in March then continue to wean if doing well with goal of changing to Pepcid or similar  F/u 6m         Relevant Orders    CBC and differential       Endocrine    Hypothyroidism     Appears to be stable clinically  Check TSH  Adjust meds if TSH is not at goal  Recheck 6m         Relevant Orders    TSH, 3rd generation       Other    Anxiety     Situational  Stable at present  Continue to monitor  Recheck 6m - earlier if needed  Relevant Orders    CBC and differential    Hyperlipidemia     Check labs  Continue atorvastatin  Recheck 6m         Relevant Orders    Comprehensive metabolic panel    Lipid panel      Other Visit Diagnoses     Medicare annual wellness visit, subsequent    -  Primary    Asymptomatic postmenopausal state        Relevant Orders    DXA bone density spine hip and pelvis        BMI Counseling: Body mass index is 28 7 kg/m²  The BMI is above normal  Exercise recommendations include exercising 3-5 times per week  Preventive health issues were discussed with patient, and age appropriate screening tests were ordered as noted in patient's After Visit Summary  Personalized health advice and appropriate referrals for health education or preventive services given if needed, as noted in patient's After Visit Summary         History of Present Illness:     Patient presents for Medicare Annual Wellness visit    Patient Care Team:  Millicent Morley MD as PCP - General  Lisa Scales MD     Problem List:     Patient Active Problem List   Diagnosis    Esophageal reflux    Hyperlipidemia    Hypothyroidism    White coat syndrome with high blood pressure without hypertension    Basal cell carcinoma of skin of nose    Nontoxic single thyroid nodule    Encounter for gynecological examination (general) (routine) without abnormal findings    Anxiety      Past Medical and Surgical History:     Past Medical History:   Diagnosis Date    Acid reflux     Basal cell carcinoma     basal cell on back and nose    Dental bridge present     upper    Disease of thyroid gland     Hashimoto's disease     High cholesterol     Osteopenia     Seasonal allergies     mold,pollen    Thyroid disease     thyroid nodules    Wears glasses      Past Surgical History:   Procedure Laterality Date    COLONOSCOPY      ESOPHAGOGASTRODUODENOSCOPY      MOHS RECONSTRUCTION N/A 10/4/2016    Procedure: RECONSTRUCTION MOHS DEFECT NASAL TIP WITH FLAP;  Surgeon: Yared Lopez MD;  Location: [facility];  Service:     NM EXC SKIN BENIG >4 CM TRUNK,ARM,LEG N/A 3/19/2019    Procedure: MID UPPER BACK LIPOMA EXCISION;  Surgeon: Yared Lopez MD;  [facility];  Service: Plastics    NM RECMPL WND TRUNK 2 6-7 5 CM N/A 3/19/2019    Procedure: COMPLEX CLOSURE;  Surgeon: Yared Lopez MD;  [facility];  Service: Plastics    SKIN BIOPSY      SKIN CANCER EXCISION      basal cell CA on back and nose-2009 and 2016    TONSILLECTOMY        Family History:     Family History   Problem Relation Age of Onset    Colon cancer Mother 61    Pancreatic cancer Father 62    Thyroid cancer Father 48    Coronary artery disease Paternal Grandmother     Breast cancer Cousin 54    No Known Problems Maternal Aunt     No Known Problems Sister       Social History:        Social History     Socioeconomic History    Marital status: /Civil Union     Spouse name: None    Number of children: None    Years of education: None    Highest education level: None   Occupational History    None   Social Needs    Financial resource strain: None    Food insecurity     Worry: None     Inability: None    Transportation needs     Medical: None     Non-medical: None   Tobacco Use    Smoking status: Former Smoker     Packs/day: 1 00     Years: 20 00 Pack years: 20 00    Smokeless tobacco: Never Used    Tobacco comment: quit 30 yrs ago   Substance and Sexual Activity    Alcohol use: Yes     Frequency: 4 or more times a week     Drinks per session: 1 or 2     Comment: wine mainly on weekends / social per [employer]    Drug use: No    Sexual activity: Never   Lifestyle    Physical activity     Days per week: None     Minutes per session: None    Stress: None   Relationships    Social connections     Talks on phone: None     Gets together: None     Attends Taoist service: None     Active member of club or organization: None     Attends meetings of clubs or organizations: None     Relationship status: None    Intimate partner violence     Fear of current or ex partner: None     Emotionally abused: None     Physically abused: None     Forced sexual activity: None   Other Topics Concern    None   Social History Narrative    Daily coffee consumption; 1 cp/day    Daily cola consumption; ____ cp/day    Daily tea consumption; 1 cp/day    No domestic violence history    Uses seatbelts      Medications and Allergies:     Current Outpatient Medications   Medication Sig Dispense Refill    ascorbic acid (VITAMIN C) 500 mg tablet Take 500 mg by mouth daily   atorvastatin (LIPITOR) 10 mg tablet TAKE 1 TABLET BY MOUTH  DAILY 90 tablet 3    Biotin 76999 MCG TABS Take by mouth      Calcium Carbonate-Vit D-Min (CALTRATE 600+D PLUS MINERALS PO) Take by mouth daily   levothyroxine 88 mcg tablet TAKE 1 TABLET BY MOUTH  DAILY 90 tablet 3    loratadine (CLARITIN) 10 mg tablet Take 10 mg by mouth daily      LORazepam (ATIVAN) 0 5 mg tablet 1/2 - 1 tab q8h prn 15 tablet 0    mometasone (NASONEX) 50 mcg/act nasal spray USE 2 SPRAYS IN BOTH  NOSTRILS DAILY 51 g 1    Multiple Vitamins-Minerals (CENTRUM SILVER PO) Take by mouth daily        omeprazole (PriLOSEC) 20 mg delayed release capsule TAKE 1 CAPSULE BY MOUTH  DAILY 90 capsule 3     No current facility-administered medications for this visit  Allergies   Allergen Reactions    Avelox [Moxifloxacin] Hives    Augmentin [Amoxicillin-Pot Clavulanate] Rash      Immunizations:     Immunization History   Administered Date(s) Administered    Influenza Split High Dose Preservative Free IM 10/16/2012, 10/03/2013, 10/14/2014, 09/22/2015, 09/14/2016, 08/30/2017    Influenza, high dose seasonal 0 7 mL 09/25/2018, 09/26/2019, 10/07/2020    Influenza, seasonal, injectable 09/01/2011    Pneumococcal Conjugate 13-Valent 12/01/2016    Pneumococcal Polysaccharide PPV23 11/06/2013    TD (adult) Preservative Free 01/23/2019    Tuberculin Skin Test-PPD Intradermal 04/20/2004      Health Maintenance:         Topic Date Due    DXA SCAN  05/04/2020    Cervical Cancer Screening  04/09/2021    MAMMOGRAM  07/06/2021    Colorectal Cancer Screening  07/17/2028    Hepatitis C Screening  Completed         Topic Date Due    DTaP,Tdap,and Td Vaccines (1 - Tdap) 05/28/1966      Medicare Health Risk Assessment:     /80   Pulse 72   Temp 98 2 °F (36 8 °C)   Ht 5' 3" (1 6 m)   Wt 73 5 kg (162 lb)   BMI 28 70 kg/m²      Alysha Busby is here for her Subsequent Wellness visit  Health Risk Assessment:   Patient rates overall health as very good  Patient feels that their physical health rating is same  Eyesight was rated as same  Hearing was rated as same  Patient feels that their emotional and mental health rating is same  Pain experienced in the last 7 days has been none  Patient states that she has experienced no weight loss or gain in last 6 months  Depression Screening:   PHQ-2 Score: 0      Fall Risk Screening: In the past year, patient has experienced: no history of falling in past year      Urinary Incontinence Screening:   Patient has not leaked urine accidently in the last six months  Home Safety:  Patient does not have trouble with stairs inside or outside of their home   Patient has working smoke alarms and has working carbon monoxide detector  Home safety hazards include: none  Nutrition:   Current diet is Regular  Medications:   Patient is currently taking over-the-counter supplements  OTC medications include: Centrum silver, vitamin c, biotin  Patient is able to manage medications  Activities of Daily Living (ADLs)/Instrumental Activities of Daily Living (IADLs):   Walk and transfer into and out of bed and chair?: Yes  Dress and groom yourself?: Yes    Bathe or shower yourself?: Yes    Feed yourself? Yes  Do your laundry/housekeeping?: Yes  Manage your money, pay your bills and track your expenses?: Yes  Make your own meals?: Yes    Do your own shopping?: Yes    Previous Hospitalizations:   Any hospitalizations or ED visits within the last 12 months?: No      Advance Care Planning:   Living will: Yes    Durable POA for healthcare: Yes    Advanced directive: Yes      Cognitive Screening:   Provider or family/friend/caregiver concerned regarding cognition?: No    PREVENTIVE SCREENINGS      Cardiovascular Screening:    General: Screening Not Indicated and History Lipid Disorder      Diabetes Screening:     General: Screening Current      Colorectal Cancer Screening:     General: Screening Current      Breast Cancer Screening:     General: Screening Current      Cervical Cancer Screening:    General: Screening Not Indicated      Osteoporosis Screening:    General: Risks and Benefits Discussed    Due for: DXA Appendicular      Abdominal Aortic Aneurysm (AAA) Screening:        General: Screening Not Indicated      Lung Cancer Screening:     General: Screening Not Indicated      Hepatitis C Screening:    General: Screening Current    Other Counseling Topics:   Calcium and vitamin D intake and regular weightbearing exercise         Dany Campbell MD

## 2021-02-17 ENCOUNTER — IMMUNIZATIONS (OUTPATIENT)
Dept: FAMILY MEDICINE CLINIC | Facility: HOSPITAL | Age: 76
End: 2021-02-17

## 2021-02-17 DIAGNOSIS — Z23 ENCOUNTER FOR IMMUNIZATION: Primary | ICD-10-CM

## 2021-02-17 PROCEDURE — 0001A SARS-COV-2 / COVID-19 MRNA VACCINE (PFIZER-BIONTECH) 30 MCG: CPT

## 2021-02-17 PROCEDURE — 91300 SARS-COV-2 / COVID-19 MRNA VACCINE (PFIZER-BIONTECH) 30 MCG: CPT

## 2021-02-24 ENCOUNTER — LAB (OUTPATIENT)
Dept: LAB | Facility: CLINIC | Age: 76
End: 2021-02-24
Payer: COMMERCIAL

## 2021-02-24 DIAGNOSIS — E03.8 HYPOTHYROIDISM DUE TO HASHIMOTO'S THYROIDITIS: ICD-10-CM

## 2021-02-24 DIAGNOSIS — F41.9 ANXIETY: ICD-10-CM

## 2021-02-24 DIAGNOSIS — K21.9 GASTROESOPHAGEAL REFLUX DISEASE, UNSPECIFIED WHETHER ESOPHAGITIS PRESENT: ICD-10-CM

## 2021-02-24 DIAGNOSIS — E06.3 HYPOTHYROIDISM DUE TO HASHIMOTO'S THYROIDITIS: ICD-10-CM

## 2021-02-24 DIAGNOSIS — E78.2 MIXED HYPERLIPIDEMIA: ICD-10-CM

## 2021-02-24 LAB
ALBUMIN SERPL BCP-MCNC: 3.9 G/DL (ref 3.5–5)
ALP SERPL-CCNC: 66 U/L (ref 46–116)
ALT SERPL W P-5'-P-CCNC: 23 U/L (ref 12–78)
ANION GAP SERPL CALCULATED.3IONS-SCNC: 3 MMOL/L (ref 4–13)
AST SERPL W P-5'-P-CCNC: 17 U/L (ref 5–45)
BASOPHILS # BLD AUTO: 0.02 THOUSANDS/ΜL (ref 0–0.1)
BASOPHILS NFR BLD AUTO: 1 % (ref 0–1)
BILIRUB SERPL-MCNC: 0.64 MG/DL (ref 0.2–1)
BUN SERPL-MCNC: 17 MG/DL (ref 5–25)
CALCIUM SERPL-MCNC: 10.4 MG/DL (ref 8.3–10.1)
CHLORIDE SERPL-SCNC: 110 MMOL/L (ref 100–108)
CHOLEST SERPL-MCNC: 224 MG/DL (ref 50–200)
CO2 SERPL-SCNC: 28 MMOL/L (ref 21–32)
CREAT SERPL-MCNC: 0.77 MG/DL (ref 0.6–1.3)
EOSINOPHIL # BLD AUTO: 0.1 THOUSAND/ΜL (ref 0–0.61)
EOSINOPHIL NFR BLD AUTO: 3 % (ref 0–6)
ERYTHROCYTE [DISTWIDTH] IN BLOOD BY AUTOMATED COUNT: 12.8 % (ref 11.6–15.1)
GFR SERPL CREATININE-BSD FRML MDRD: 77 ML/MIN/1.73SQ M
GLUCOSE P FAST SERPL-MCNC: 98 MG/DL (ref 65–99)
HCT VFR BLD AUTO: 42.7 % (ref 34.8–46.1)
HDLC SERPL-MCNC: 84 MG/DL
HGB BLD-MCNC: 13.6 G/DL (ref 11.5–15.4)
IMM GRANULOCYTES # BLD AUTO: 0.01 THOUSAND/UL (ref 0–0.2)
IMM GRANULOCYTES NFR BLD AUTO: 0 % (ref 0–2)
LDLC SERPL CALC-MCNC: 121 MG/DL (ref 0–100)
LYMPHOCYTES # BLD AUTO: 1.24 THOUSANDS/ΜL (ref 0.6–4.47)
LYMPHOCYTES NFR BLD AUTO: 31 % (ref 14–44)
MCH RBC QN AUTO: 29.7 PG (ref 26.8–34.3)
MCHC RBC AUTO-ENTMCNC: 31.9 G/DL (ref 31.4–37.4)
MCV RBC AUTO: 93 FL (ref 82–98)
MONOCYTES # BLD AUTO: 0.36 THOUSAND/ΜL (ref 0.17–1.22)
MONOCYTES NFR BLD AUTO: 9 % (ref 4–12)
NEUTROPHILS # BLD AUTO: 2.23 THOUSANDS/ΜL (ref 1.85–7.62)
NEUTS SEG NFR BLD AUTO: 56 % (ref 43–75)
NONHDLC SERPL-MCNC: 140 MG/DL
NRBC BLD AUTO-RTO: 0 /100 WBCS
PLATELET # BLD AUTO: 183 THOUSANDS/UL (ref 149–390)
PMV BLD AUTO: 11.7 FL (ref 8.9–12.7)
POTASSIUM SERPL-SCNC: 4.6 MMOL/L (ref 3.5–5.3)
PROT SERPL-MCNC: 7.3 G/DL (ref 6.4–8.2)
RBC # BLD AUTO: 4.58 MILLION/UL (ref 3.81–5.12)
SODIUM SERPL-SCNC: 141 MMOL/L (ref 136–145)
TRIGL SERPL-MCNC: 96 MG/DL
TSH SERPL DL<=0.05 MIU/L-ACNC: 0.86 UIU/ML (ref 0.36–3.74)
WBC # BLD AUTO: 3.96 THOUSAND/UL (ref 4.31–10.16)

## 2021-02-24 PROCEDURE — 36415 COLL VENOUS BLD VENIPUNCTURE: CPT

## 2021-02-24 PROCEDURE — 85025 COMPLETE CBC W/AUTO DIFF WBC: CPT

## 2021-02-24 PROCEDURE — 84443 ASSAY THYROID STIM HORMONE: CPT

## 2021-02-24 PROCEDURE — 80053 COMPREHEN METABOLIC PANEL: CPT

## 2021-02-24 PROCEDURE — 80061 LIPID PANEL: CPT

## 2021-03-08 ENCOUNTER — IMMUNIZATIONS (OUTPATIENT)
Dept: FAMILY MEDICINE CLINIC | Facility: HOSPITAL | Age: 76
End: 2021-03-08

## 2021-03-08 DIAGNOSIS — Z23 ENCOUNTER FOR IMMUNIZATION: Primary | ICD-10-CM

## 2021-03-08 PROCEDURE — 91300 SARS-COV-2 / COVID-19 MRNA VACCINE (PFIZER-BIONTECH) 30 MCG: CPT

## 2021-03-08 PROCEDURE — 0002A SARS-COV-2 / COVID-19 MRNA VACCINE (PFIZER-BIONTECH) 30 MCG: CPT

## 2021-03-18 DIAGNOSIS — F41.9 ANXIETY: ICD-10-CM

## 2021-03-18 NOTE — TELEPHONE ENCOUNTER
Per PDMP last fill 11/18/20  Last OV 02/15/21, Next OV 09/07/21 11/18/2020 1 11/18/2020   LORAZEPAM 0 5 MG TABLET  15 0 5 [facility]   [id number]  PENNS ([id number]) 0  [place]     08/10/2020 1 08/10/2020   LORAZEPAM 0 5 MG TABLET  15 0 5 [facility] P[facility]   [id number]  PENNS ([id number]) 0  Private Pay [place]

## 2021-03-19 RX ORDER — LORAZEPAM 0.5 MG/1
TABLET ORAL
Qty: 15 TABLET | Refills: 0 | Status: SHIPPED | OUTPATIENT
Start: 2021-03-19 | End: 2021-09-30 | Stop reason: SDUPTHER

## 2021-03-24 ENCOUNTER — TELEPHONE (OUTPATIENT)
Dept: FAMILY MEDICINE CLINIC | Facility: CLINIC | Age: 76
End: 2021-03-24

## 2021-03-24 DIAGNOSIS — E78.2 MIXED HYPERLIPIDEMIA: Primary | ICD-10-CM

## 2021-03-24 DIAGNOSIS — K21.9 GASTROESOPHAGEAL REFLUX DISEASE: ICD-10-CM

## 2021-03-24 RX ORDER — OMEPRAZOLE 10 MG/1
10 CAPSULE, DELAYED RELEASE ORAL DAILY
Qty: 30 CAPSULE | Refills: 1 | Status: SHIPPED | OUTPATIENT
Start: 2021-03-24 | End: 2021-04-08 | Stop reason: SDUPTHER

## 2021-03-24 NOTE — TELEPHONE ENCOUNTER
Patient called regarding two things  1  Can a new CMP order be placed so patient can get blood work done   2  She would like to get her Omeprazole decreased to 10mg

## 2021-03-26 ENCOUNTER — TRANSCRIBE ORDERS (OUTPATIENT)
Dept: FAMILY MEDICINE CLINIC | Facility: HOSPITAL | Age: 76
End: 2021-03-26

## 2021-03-26 ENCOUNTER — TELEPHONE (OUTPATIENT)
Dept: FAMILY MEDICINE CLINIC | Facility: CLINIC | Age: 76
End: 2021-03-26

## 2021-03-26 DIAGNOSIS — E83.52 HYPERCALCEMIA: Primary | ICD-10-CM

## 2021-03-26 NOTE — TELEPHONE ENCOUNTER
Patient called  She was supposed to repeat CMP in one month   She went today and the lab wouldn't draw it because it says expected date 06/07/21   Please advise

## 2021-03-31 ENCOUNTER — APPOINTMENT (OUTPATIENT)
Dept: LAB | Facility: CLINIC | Age: 76
End: 2021-03-31
Payer: COMMERCIAL

## 2021-03-31 DIAGNOSIS — E83.52 HYPERCALCEMIA: ICD-10-CM

## 2021-03-31 LAB
ALBUMIN SERPL BCP-MCNC: 3.7 G/DL (ref 3.5–5)
ALP SERPL-CCNC: 70 U/L (ref 46–116)
ALT SERPL W P-5'-P-CCNC: 20 U/L (ref 12–78)
ANION GAP SERPL CALCULATED.3IONS-SCNC: 10 MMOL/L (ref 4–13)
AST SERPL W P-5'-P-CCNC: 17 U/L (ref 5–45)
BILIRUB SERPL-MCNC: 0.63 MG/DL (ref 0.2–1)
BUN SERPL-MCNC: 18 MG/DL (ref 5–25)
CALCIUM SERPL-MCNC: 9.6 MG/DL (ref 8.3–10.1)
CHLORIDE SERPL-SCNC: 105 MMOL/L (ref 100–108)
CO2 SERPL-SCNC: 26 MMOL/L (ref 21–32)
CREAT SERPL-MCNC: 0.7 MG/DL (ref 0.6–1.3)
GFR SERPL CREATININE-BSD FRML MDRD: 86 ML/MIN/1.73SQ M
GLUCOSE SERPL-MCNC: 103 MG/DL (ref 65–140)
POTASSIUM SERPL-SCNC: 4 MMOL/L (ref 3.5–5.3)
PROT SERPL-MCNC: 7.3 G/DL (ref 6.4–8.2)
SODIUM SERPL-SCNC: 141 MMOL/L (ref 136–145)

## 2021-03-31 PROCEDURE — 36415 COLL VENOUS BLD VENIPUNCTURE: CPT

## 2021-03-31 PROCEDURE — 80053 COMPREHEN METABOLIC PANEL: CPT

## 2021-04-08 DIAGNOSIS — K21.9 GASTROESOPHAGEAL REFLUX DISEASE: ICD-10-CM

## 2021-04-08 RX ORDER — OMEPRAZOLE 10 MG/1
10 CAPSULE, DELAYED RELEASE ORAL DAILY
Qty: 90 CAPSULE | Refills: 3 | Status: SHIPPED | OUTPATIENT
Start: 2021-04-08 | End: 2021-04-26 | Stop reason: ALTCHOICE

## 2021-04-14 DIAGNOSIS — J30.9 CHRONIC ALLERGIC RHINITIS: ICD-10-CM

## 2021-04-15 RX ORDER — MOMETASONE FUROATE 50 UG/1
SPRAY, METERED NASAL
Qty: 51 G | Refills: 1 | Status: SHIPPED | OUTPATIENT
Start: 2021-04-15 | End: 2021-12-21

## 2021-04-23 ENCOUNTER — TELEPHONE (OUTPATIENT)
Dept: FAMILY MEDICINE CLINIC | Facility: CLINIC | Age: 76
End: 2021-04-23

## 2021-04-23 NOTE — TELEPHONE ENCOUNTER
Patient called stating you have been helping her wean off of her Omeprazole  She would like to try Pepcid now but she states she would feel more comfortable getting a prescription strength  Would you be able to call this in?      [facility] to check with pharmacy

## 2021-04-26 DIAGNOSIS — K21.9 GASTROESOPHAGEAL REFLUX DISEASE, UNSPECIFIED WHETHER ESOPHAGITIS PRESENT: Primary | ICD-10-CM

## 2021-04-26 RX ORDER — FAMOTIDINE 20 MG/1
20 TABLET, FILM COATED ORAL 2 TIMES DAILY
Qty: 60 TABLET | Refills: 5 | Status: SHIPPED | OUTPATIENT
Start: 2021-04-26 | End: 2021-05-06 | Stop reason: SDUPTHER

## 2021-05-06 DIAGNOSIS — K21.9 GASTROESOPHAGEAL REFLUX DISEASE, UNSPECIFIED WHETHER ESOPHAGITIS PRESENT: ICD-10-CM

## 2021-05-07 RX ORDER — FAMOTIDINE 20 MG/1
20 TABLET, FILM COATED ORAL 2 TIMES DAILY
Qty: 60 TABLET | Refills: 5 | Status: SHIPPED | OUTPATIENT
Start: 2021-05-07 | End: 2021-12-06

## 2021-06-10 ENCOUNTER — TELEPHONE (OUTPATIENT)
Dept: FAMILY MEDICINE CLINIC | Facility: CLINIC | Age: 76
End: 2021-06-10

## 2021-06-10 DIAGNOSIS — J01.10 ACUTE NON-RECURRENT FRONTAL SINUSITIS: Primary | ICD-10-CM

## 2021-06-10 RX ORDER — AZITHROMYCIN 250 MG/1
TABLET, FILM COATED ORAL
Qty: 6 TABLET | Refills: 0 | Status: SHIPPED | OUTPATIENT
Start: 2021-06-10 | End: 2021-06-14

## 2021-06-10 NOTE — TELEPHONE ENCOUNTER
Wants antibiotic, has a sinus infection, not blowing anything out of nose, left side face pressure, ear pressure and pain, lightheaded,  Symptoms over 1 wk, she takes allergy meds and Sudafed,  No fever  No cough  No chest congestion  No loss taste or smell

## 2021-07-12 ENCOUNTER — HOSPITAL ENCOUNTER (OUTPATIENT)
Dept: RADIOLOGY | Age: 76
Discharge: HOME | End: 2021-07-12
Payer: COMMERCIAL

## 2021-07-12 VITALS — HEIGHT: 63 IN | WEIGHT: 162 LBS | BODY MASS INDEX: 28.7 KG/M2

## 2021-07-12 DIAGNOSIS — Z12.31 ENCOUNTER FOR SCREENING MAMMOGRAM FOR MALIGNANT NEOPLASM OF BREAST: ICD-10-CM

## 2021-07-12 DIAGNOSIS — Z78.0 ASYMPTOMATIC POSTMENOPAUSAL STATE: ICD-10-CM

## 2021-07-12 PROCEDURE — 77063 BREAST TOMOSYNTHESIS BI: CPT

## 2021-07-12 PROCEDURE — 77080 DXA BONE DENSITY AXIAL: CPT

## 2021-07-12 PROCEDURE — 77067 SCR MAMMO BI INCL CAD: CPT

## 2021-09-01 ENCOUNTER — RA CDI HCC (OUTPATIENT)
Dept: OTHER | Facility: HOSPITAL | Age: 76
End: 2021-09-01

## 2021-09-01 NOTE — PROGRESS NOTES
[facility]  coding opportunities       Chart reviewed, no opportunity found: CHART REVIEWED, NO OPPORTUNITY FOUND                        Patients insurance company: [facility] [address]  (Medicare Advantage and Commercial)

## 2021-09-07 ENCOUNTER — OFFICE VISIT (OUTPATIENT)
Dept: FAMILY MEDICINE CLINIC | Facility: CLINIC | Age: 76
End: 2021-09-07
Payer: COMMERCIAL

## 2021-09-07 VITALS
DIASTOLIC BLOOD PRESSURE: 80 MMHG | WEIGHT: 161 LBS | BODY MASS INDEX: 28.53 KG/M2 | TEMPERATURE: 98.9 F | SYSTOLIC BLOOD PRESSURE: 122 MMHG | HEIGHT: 63 IN | HEART RATE: 74 BPM

## 2021-09-07 DIAGNOSIS — E06.3 HYPOTHYROIDISM DUE TO HASHIMOTO'S THYROIDITIS: Primary | ICD-10-CM

## 2021-09-07 DIAGNOSIS — K63.5 POLYP OF COLON, UNSPECIFIED PART OF COLON, UNSPECIFIED TYPE: ICD-10-CM

## 2021-09-07 DIAGNOSIS — E78.2 MIXED HYPERLIPIDEMIA: ICD-10-CM

## 2021-09-07 DIAGNOSIS — E03.8 HYPOTHYROIDISM DUE TO HASHIMOTO'S THYROIDITIS: Primary | ICD-10-CM

## 2021-09-07 DIAGNOSIS — Z23 IMMUNIZATION DUE: ICD-10-CM

## 2021-09-07 PROCEDURE — G0008 ADMIN INFLUENZA VIRUS VAC: HCPCS | Performed by: FAMILY MEDICINE

## 2021-09-07 PROCEDURE — 99214 OFFICE O/P EST MOD 30 MIN: CPT | Performed by: FAMILY MEDICINE

## 2021-09-07 PROCEDURE — 3008F BODY MASS INDEX DOCD: CPT | Performed by: FAMILY MEDICINE

## 2021-09-07 PROCEDURE — 1036F TOBACCO NON-USER: CPT | Performed by: FAMILY MEDICINE

## 2021-09-07 PROCEDURE — 90662 IIV NO PRSV INCREASED AG IM: CPT | Performed by: FAMILY MEDICINE

## 2021-09-07 PROCEDURE — 1160F RVW MEDS BY RX/DR IN RCRD: CPT | Performed by: FAMILY MEDICINE

## 2021-09-07 NOTE — PROGRESS NOTES
Assessment/Plan:    Hypothyroidism  Appears to be stable clinically  Check TSH  Adjust meds if TSH is not at goal  Recheck 6m      Hyperlipidemia  Recheck lipid panel  Continue atorvastatin  Consider change in dose if not at goal  Recheck 6m       Diagnoses and all orders for this visit:    Hypothyroidism due to Hashimoto's thyroiditis  -     TSH, 3rd generation; Future    Mixed hyperlipidemia  -     Comprehensive metabolic panel; Future  -     Lipid panel; Future    Polyp of colon, unspecified part of colon, unspecified type  -     Ambulatory referral for colonoscopy; Future  -     CBC and differential; Future    Immunization due  -     influenza vaccine, high-dose, PF 0 7 mL (FLUZONE HIGH-DOSE)    Other orders  -     Sodium Fluoride 5000 Sensitive 1 1-5 % PSTE          Subjective:      Patient ID: Elis Ramirez is a 78 y o  female  f/u multiple med issues  - pt feels well     - mood sl worse  Recently lost a friend to cancer  Otherwise, doing well  - pt has not been exercising regularly  Pt denies CP, palp, lightheadedness or other CV symptoms with or without exertion  - no new GI issues  Due for colonoscopy - needs referral    - no new  issues  Had mammo in July          The following portions of the patient's history were reviewed and updated as appropriate:   She  has a past medical history of Acid reflux, Basal cell carcinoma, Dental bridge present, Disease of thyroid gland, Hashimoto's disease, High cholesterol, Osteopenia, Seasonal allergies, Thyroid disease, and Wears glasses    She   Patient Active Problem List    Diagnosis Date Noted    Anxiety 08/10/2020    Encounter for gynecological examination (general) (routine) without abnormal findings 07/02/2020    White coat syndrome with high blood pressure without hypertension 04/12/2017    Basal cell carcinoma of skin of nose 08/16/2016    Esophageal reflux 09/26/2012    Hyperlipidemia 09/26/2012    Hypothyroidism 09/26/2012    Nontoxic single thyroid nodule 09/26/2012     She  has a past surgical history that includes Skin cancer excision; Tonsillectomy; Esophagogastroduodenoscopy; Colonoscopy; Skin biopsy; MOHS RECONSTRUCTION (N/A, 10/4/2016); pr exc skin benig >4 cm trunk,arm,leg (N/A, 3/19/2019); and pr recmpl wnd trunk 2 6-7 5 cm (N/A, 3/19/2019)  She  reports that she has quit smoking  She has a 20 00 pack-year smoking history  She has never used smokeless tobacco  She reports current alcohol use  She reports that she does not use drugs  Current Outpatient Medications   Medication Sig Dispense Refill    ascorbic acid (VITAMIN C) 500 mg tablet Take 500 mg by mouth daily   atorvastatin (LIPITOR) 10 mg tablet TAKE 1 TABLET BY MOUTH  DAILY 90 tablet 3    Biotin 97378 MCG TABS Take by mouth      Calcium Carbonate-Vit D-Min (CALTRATE 600+D PLUS MINERALS PO) Take by mouth daily   famotidine (PEPCID) 20 mg tablet Take 1 tablet (20 mg total) by mouth 2 (two) times a day 60 tablet 5    levothyroxine 88 mcg tablet TAKE 1 TABLET BY MOUTH  DAILY 90 tablet 3    loratadine (CLARITIN) 10 mg tablet Take 10 mg by mouth daily      LORazepam (ATIVAN) 0 5 mg tablet 1/2 - 1 tab q8h prn 15 tablet 0    mometasone (NASONEX) 50 mcg/act nasal spray USE 2 SPRAYS IN BOTH  NOSTRILS DAILY 51 g 1    Multiple Vitamins-Minerals (CENTRUM SILVER PO) Take by mouth daily   Sodium Fluoride 5000 Sensitive 1 1-5 % PSTE        No current facility-administered medications for this visit  She is allergic to avelox [moxifloxacin] and augmentin [amoxicillin-pot clavulanate]       Review of Systems   Constitutional: Negative  HENT: Negative  Eyes: Negative  Respiratory: Negative  Cardiovascular: Negative  Gastrointestinal: Negative  Endocrine: Negative  Genitourinary: Negative  Musculoskeletal: Negative  Skin: Negative  Allergic/Immunologic: Negative  Neurological: Negative  Hematological: Negative      Psychiatric/Behavioral: Negative  Objective:      /80   Pulse 74   Temp 98 9 °F (37 2 °C)   Ht 5' 3" (1 6 m)   Wt 73 kg (161 lb)   BMI 28 52 kg/m²          Physical Exam  Vitals reviewed  Constitutional:       Appearance: She is well-developed  She is not diaphoretic  HENT:      Head: Normocephalic and atraumatic  Right Ear: Tympanic membrane, ear canal and external ear normal       Left Ear: Tympanic membrane, ear canal and external ear normal       Mouth/Throat:      Mouth: Mucous membranes are moist    Eyes:      Extraocular Movements: Extraocular movements intact  Conjunctiva/sclera: Conjunctivae normal       Pupils: Pupils are equal, round, and reactive to light  Neck:      Thyroid: No thyromegaly  Vascular: No JVD  Cardiovascular:      Rate and Rhythm: Normal rate and regular rhythm  Pulses: Normal pulses  Heart sounds: No murmur heard  Pulmonary:      Effort: Pulmonary effort is normal       Breath sounds: Normal breath sounds  Abdominal:      General: There is no distension  Palpations: Abdomen is soft  There is no mass  Tenderness: There is no abdominal tenderness  Musculoskeletal:         General: Deformity (mild OA changes noted) present  No swelling or tenderness  Normal range of motion  Cervical back: Normal range of motion and neck supple  No tenderness  No muscular tenderness  Right lower leg: No edema  Left lower leg: No edema  Lymphadenopathy:      Cervical: No cervical adenopathy  Skin:     General: Skin is warm and dry  Capillary Refill: Capillary refill takes less than 2 seconds  Neurological:      Mental Status: She is alert and oriented to person, place, and time  Cranial Nerves: No cranial nerve deficit  Sensory: No sensory deficit  Motor: No weakness or abnormal muscle tone  Gait: Gait normal       Deep Tendon Reflexes: Reflexes are normal and symmetric   Reflexes normal    Psychiatric:         Mood and Affect: Mood normal       Comments: PHQ-9 Depression Screening    PHQ-9:   Frequency of the following problems over the past two weeks:      Little interest or pleasure in doing things: 0 - not at all  Feeling down, depressed, or hopeless: 1 - several days

## 2021-09-10 ENCOUNTER — APPOINTMENT (OUTPATIENT)
Dept: LAB | Facility: CLINIC | Age: 76
End: 2021-09-10
Payer: COMMERCIAL

## 2021-09-10 DIAGNOSIS — E06.3 HYPOTHYROIDISM DUE TO HASHIMOTO'S THYROIDITIS: ICD-10-CM

## 2021-09-10 DIAGNOSIS — K63.5 POLYP OF COLON, UNSPECIFIED PART OF COLON, UNSPECIFIED TYPE: ICD-10-CM

## 2021-09-10 DIAGNOSIS — E03.8 HYPOTHYROIDISM DUE TO HASHIMOTO'S THYROIDITIS: ICD-10-CM

## 2021-09-10 DIAGNOSIS — E78.2 MIXED HYPERLIPIDEMIA: ICD-10-CM

## 2021-09-10 LAB
ALBUMIN SERPL BCP-MCNC: 4 G/DL (ref 3.5–5)
ALP SERPL-CCNC: 60 U/L (ref 46–116)
ALT SERPL W P-5'-P-CCNC: 21 U/L (ref 12–78)
ANION GAP SERPL CALCULATED.3IONS-SCNC: 8 MMOL/L (ref 4–13)
AST SERPL W P-5'-P-CCNC: 18 U/L (ref 5–45)
BASOPHILS # BLD AUTO: 0.01 THOUSANDS/ΜL (ref 0–0.1)
BASOPHILS NFR BLD AUTO: 0 % (ref 0–1)
BILIRUB SERPL-MCNC: 0.5 MG/DL (ref 0.2–1)
BUN SERPL-MCNC: 16 MG/DL (ref 5–25)
CALCIUM SERPL-MCNC: 10.9 MG/DL (ref 8.3–10.1)
CHLORIDE SERPL-SCNC: 107 MMOL/L (ref 100–108)
CHOLEST SERPL-MCNC: 198 MG/DL (ref 50–200)
CO2 SERPL-SCNC: 28 MMOL/L (ref 21–32)
CREAT SERPL-MCNC: 0.86 MG/DL (ref 0.6–1.3)
EOSINOPHIL # BLD AUTO: 0.09 THOUSAND/ΜL (ref 0–0.61)
EOSINOPHIL NFR BLD AUTO: 2 % (ref 0–6)
ERYTHROCYTE [DISTWIDTH] IN BLOOD BY AUTOMATED COUNT: 12.9 % (ref 11.6–15.1)
GFR SERPL CREATININE-BSD FRML MDRD: 67 ML/MIN/1.73SQ M
GLUCOSE P FAST SERPL-MCNC: 109 MG/DL (ref 65–99)
HCT VFR BLD AUTO: 43.7 % (ref 34.8–46.1)
HDLC SERPL-MCNC: 91 MG/DL
HGB BLD-MCNC: 13.8 G/DL (ref 11.5–15.4)
IMM GRANULOCYTES # BLD AUTO: 0.01 THOUSAND/UL (ref 0–0.2)
IMM GRANULOCYTES NFR BLD AUTO: 0 % (ref 0–2)
LDLC SERPL CALC-MCNC: 91 MG/DL (ref 0–100)
LYMPHOCYTES # BLD AUTO: 1.35 THOUSANDS/ΜL (ref 0.6–4.47)
LYMPHOCYTES NFR BLD AUTO: 34 % (ref 14–44)
MCH RBC QN AUTO: 29.9 PG (ref 26.8–34.3)
MCHC RBC AUTO-ENTMCNC: 31.6 G/DL (ref 31.4–37.4)
MCV RBC AUTO: 95 FL (ref 82–98)
MONOCYTES # BLD AUTO: 0.36 THOUSAND/ΜL (ref 0.17–1.22)
MONOCYTES NFR BLD AUTO: 9 % (ref 4–12)
NEUTROPHILS # BLD AUTO: 2.16 THOUSANDS/ΜL (ref 1.85–7.62)
NEUTS SEG NFR BLD AUTO: 55 % (ref 43–75)
NONHDLC SERPL-MCNC: 107 MG/DL
NRBC BLD AUTO-RTO: 0 /100 WBCS
PLATELET # BLD AUTO: 177 THOUSANDS/UL (ref 149–390)
PMV BLD AUTO: 11.4 FL (ref 8.9–12.7)
POTASSIUM SERPL-SCNC: 4.7 MMOL/L (ref 3.5–5.3)
PROT SERPL-MCNC: 7 G/DL (ref 6.4–8.2)
RBC # BLD AUTO: 4.61 MILLION/UL (ref 3.81–5.12)
SODIUM SERPL-SCNC: 143 MMOL/L (ref 136–145)
TRIGL SERPL-MCNC: 81 MG/DL
TSH SERPL DL<=0.05 MIU/L-ACNC: 1.4 UIU/ML (ref 0.36–3.74)
WBC # BLD AUTO: 3.98 THOUSAND/UL (ref 4.31–10.16)

## 2021-09-10 PROCEDURE — 84443 ASSAY THYROID STIM HORMONE: CPT

## 2021-09-10 PROCEDURE — 36415 COLL VENOUS BLD VENIPUNCTURE: CPT

## 2021-09-10 PROCEDURE — 80053 COMPREHEN METABOLIC PANEL: CPT

## 2021-09-10 PROCEDURE — 85025 COMPLETE CBC W/AUTO DIFF WBC: CPT

## 2021-09-10 PROCEDURE — 80061 LIPID PANEL: CPT

## 2021-09-30 ENCOUNTER — TELEPHONE (OUTPATIENT)
Dept: FAMILY MEDICINE CLINIC | Facility: CLINIC | Age: 76
End: 2021-09-30

## 2021-10-02 ENCOUNTER — IMMUNIZATIONS (OUTPATIENT)
Dept: FAMILY MEDICINE CLINIC | Facility: HOSPITAL | Age: 76
End: 2021-10-02

## 2021-10-02 DIAGNOSIS — Z23 ENCOUNTER FOR IMMUNIZATION: Primary | ICD-10-CM

## 2021-10-02 PROCEDURE — 91300 SARS-COV-2 / COVID-19 MRNA VACCINE (PFIZER-BIONTECH) 30 MCG: CPT

## 2021-10-02 PROCEDURE — 0001A SARS-COV-2 / COVID-19 MRNA VACCINE (PFIZER-BIONTECH) 30 MCG: CPT

## 2021-10-04 DIAGNOSIS — E78.2 MIXED HYPERLIPIDEMIA: ICD-10-CM

## 2021-10-04 RX ORDER — ATORVASTATIN CALCIUM 10 MG/1
TABLET, FILM COATED ORAL
Qty: 90 TABLET | Refills: 3 | Status: SHIPPED | OUTPATIENT
Start: 2021-10-04 | End: 2022-07-11

## 2021-11-19 ENCOUNTER — OFFICE VISIT (OUTPATIENT)
Dept: GASTROENTEROLOGY | Facility: AMBULARY SURGERY CENTER | Age: 76
End: 2021-11-19
Payer: COMMERCIAL

## 2021-11-19 VITALS
HEIGHT: 63 IN | SYSTOLIC BLOOD PRESSURE: 154 MMHG | WEIGHT: 165.6 LBS | BODY MASS INDEX: 29.34 KG/M2 | DIASTOLIC BLOOD PRESSURE: 88 MMHG

## 2021-11-19 DIAGNOSIS — K63.5 POLYP OF COLON, UNSPECIFIED PART OF COLON, UNSPECIFIED TYPE: ICD-10-CM

## 2021-11-19 DIAGNOSIS — R13.10 DYSPHAGIA, UNSPECIFIED TYPE: ICD-10-CM

## 2021-11-19 DIAGNOSIS — Z83.71 FAMILY HISTORY OF COLONIC POLYPS: Primary | ICD-10-CM

## 2021-11-19 DIAGNOSIS — K21.9 GASTROESOPHAGEAL REFLUX DISEASE WITHOUT ESOPHAGITIS: ICD-10-CM

## 2021-11-19 PROCEDURE — 99204 OFFICE O/P NEW MOD 45 MIN: CPT | Performed by: PHYSICIAN ASSISTANT

## 2021-11-19 PROCEDURE — 1036F TOBACCO NON-USER: CPT | Performed by: PHYSICIAN ASSISTANT

## 2021-11-19 PROCEDURE — 3008F BODY MASS INDEX DOCD: CPT | Performed by: PHYSICIAN ASSISTANT

## 2021-11-19 PROCEDURE — 1160F RVW MEDS BY RX/DR IN RCRD: CPT | Performed by: PHYSICIAN ASSISTANT

## 2021-11-19 RX ORDER — SOD SULF/POT CHLORIDE/MAG SULF 1.479 G
1 TABLET ORAL ONCE
Qty: 24 TABLET | Refills: 0 | Status: SHIPPED | OUTPATIENT
Start: 2021-11-19 | End: 2021-11-19

## 2021-11-29 ENCOUNTER — TELEPHONE (OUTPATIENT)
Dept: FAMILY MEDICINE CLINIC | Facility: CLINIC | Age: 76
End: 2021-11-29

## 2021-12-04 DIAGNOSIS — K21.9 GASTROESOPHAGEAL REFLUX DISEASE, UNSPECIFIED WHETHER ESOPHAGITIS PRESENT: ICD-10-CM

## 2021-12-06 RX ORDER — FAMOTIDINE 20 MG/1
TABLET, FILM COATED ORAL
Qty: 180 TABLET | Refills: 3 | Status: SHIPPED | OUTPATIENT
Start: 2021-12-06 | End: 2022-03-23 | Stop reason: HOSPADM

## 2021-12-20 DIAGNOSIS — J30.9 CHRONIC ALLERGIC RHINITIS: ICD-10-CM

## 2021-12-20 DIAGNOSIS — E03.9 HYPOTHYROIDISM, UNSPECIFIED TYPE: ICD-10-CM

## 2021-12-21 RX ORDER — LEVOTHYROXINE SODIUM 88 UG/1
TABLET ORAL
Qty: 90 TABLET | Refills: 3 | Status: SHIPPED | OUTPATIENT
Start: 2021-12-21

## 2021-12-21 RX ORDER — MOMETASONE FUROATE 50 UG/1
SPRAY, METERED NASAL
Qty: 51 G | Refills: 1 | Status: SHIPPED | OUTPATIENT
Start: 2021-12-21 | End: 2022-04-21

## 2022-01-11 DIAGNOSIS — F41.9 ANXIETY: ICD-10-CM

## 2022-01-11 RX ORDER — LORAZEPAM 1 MG/1
1 TABLET ORAL
Qty: 30 TABLET | Refills: 1 | Status: SHIPPED | OUTPATIENT
Start: 2022-01-11 | End: 2022-03-24 | Stop reason: SDUPTHER

## 2022-01-11 NOTE — TELEPHONE ENCOUNTER
09/30/2021  1  09/30/2021  LORAZEPAM 1 MG TABLET  30 0  30  [facility]  [id number]  PENNS ([id number])  0   [place]  [place]    03/19/2021  1  03/19/2021  LORAZEPAM 0 5 MG TABLET  15 0  5  [facility]  [id number]  P[facility]NS ([id number])  0   [place]  [place]

## 2022-03-03 ENCOUNTER — RA CDI HCC (OUTPATIENT)
Dept: OTHER | Facility: HOSPITAL | Age: 77
End: 2022-03-03

## 2022-03-03 NOTE — PROGRESS NOTES
[facility] 77  coding opportunities       Chart reviewed, no opportunity found: CHART REVIEWED, NO OPPORTUNITY FOUND                        Patients insurance company: [employer] (Medicare Advantage and Commercial)

## 2022-03-09 ENCOUNTER — APPOINTMENT (OUTPATIENT)
Dept: LAB | Facility: CLINIC | Age: 77
End: 2022-03-09
Payer: COMMERCIAL

## 2022-03-09 ENCOUNTER — OFFICE VISIT (OUTPATIENT)
Dept: FAMILY MEDICINE CLINIC | Facility: CLINIC | Age: 77
End: 2022-03-09
Payer: COMMERCIAL

## 2022-03-09 VITALS
HEIGHT: 63 IN | BODY MASS INDEX: 29.06 KG/M2 | SYSTOLIC BLOOD PRESSURE: 128 MMHG | DIASTOLIC BLOOD PRESSURE: 82 MMHG | TEMPERATURE: 98.2 F | WEIGHT: 164 LBS | HEART RATE: 76 BPM

## 2022-03-09 DIAGNOSIS — E78.2 MIXED HYPERLIPIDEMIA: ICD-10-CM

## 2022-03-09 DIAGNOSIS — E03.8 HYPOTHYROIDISM DUE TO HASHIMOTO'S THYROIDITIS: ICD-10-CM

## 2022-03-09 DIAGNOSIS — Z00.00 MEDICARE ANNUAL WELLNESS VISIT, SUBSEQUENT: Primary | ICD-10-CM

## 2022-03-09 DIAGNOSIS — E83.52 HYPERCALCEMIA: ICD-10-CM

## 2022-03-09 DIAGNOSIS — K21.9 GASTROESOPHAGEAL REFLUX DISEASE, UNSPECIFIED WHETHER ESOPHAGITIS PRESENT: ICD-10-CM

## 2022-03-09 DIAGNOSIS — E06.3 HYPOTHYROIDISM DUE TO HASHIMOTO'S THYROIDITIS: ICD-10-CM

## 2022-03-09 DIAGNOSIS — F41.9 ANXIETY: ICD-10-CM

## 2022-03-09 LAB
ALBUMIN SERPL BCP-MCNC: 4.1 G/DL (ref 3.5–5)
ALP SERPL-CCNC: 64 U/L (ref 46–116)
ALT SERPL W P-5'-P-CCNC: 25 U/L (ref 12–78)
ANION GAP SERPL CALCULATED.3IONS-SCNC: 8 MMOL/L (ref 4–13)
AST SERPL W P-5'-P-CCNC: 16 U/L (ref 5–45)
BASOPHILS # BLD AUTO: 0.02 THOUSANDS/ΜL (ref 0–0.1)
BASOPHILS NFR BLD AUTO: 1 % (ref 0–1)
BILIRUB SERPL-MCNC: 0.83 MG/DL (ref 0.2–1)
BUN SERPL-MCNC: 17 MG/DL (ref 5–25)
CALCIUM SERPL-MCNC: 11 MG/DL (ref 8.3–10.1)
CHLORIDE SERPL-SCNC: 107 MMOL/L (ref 100–108)
CO2 SERPL-SCNC: 25 MMOL/L (ref 21–32)
CREAT SERPL-MCNC: 0.84 MG/DL (ref 0.6–1.3)
EOSINOPHIL # BLD AUTO: 0.08 THOUSAND/ΜL (ref 0–0.61)
EOSINOPHIL NFR BLD AUTO: 2 % (ref 0–6)
ERYTHROCYTE [DISTWIDTH] IN BLOOD BY AUTOMATED COUNT: 12.6 % (ref 11.6–15.1)
GFR SERPL CREATININE-BSD FRML MDRD: 68 ML/MIN/1.73SQ M
GLUCOSE SERPL-MCNC: 109 MG/DL (ref 65–140)
HCT VFR BLD AUTO: 42.6 % (ref 34.8–46.1)
HGB BLD-MCNC: 13.9 G/DL (ref 11.5–15.4)
IMM GRANULOCYTES # BLD AUTO: 0.01 THOUSAND/UL (ref 0–0.2)
IMM GRANULOCYTES NFR BLD AUTO: 0 % (ref 0–2)
LYMPHOCYTES # BLD AUTO: 1.37 THOUSANDS/ΜL (ref 0.6–4.47)
LYMPHOCYTES NFR BLD AUTO: 32 % (ref 14–44)
MCH RBC QN AUTO: 30.4 PG (ref 26.8–34.3)
MCHC RBC AUTO-ENTMCNC: 32.6 G/DL (ref 31.4–37.4)
MCV RBC AUTO: 93 FL (ref 82–98)
MONOCYTES # BLD AUTO: 0.39 THOUSAND/ΜL (ref 0.17–1.22)
MONOCYTES NFR BLD AUTO: 9 % (ref 4–12)
NEUTROPHILS # BLD AUTO: 2.44 THOUSANDS/ΜL (ref 1.85–7.62)
NEUTS SEG NFR BLD AUTO: 56 % (ref 43–75)
NRBC BLD AUTO-RTO: 0 /100 WBCS
PLATELET # BLD AUTO: 198 THOUSANDS/UL (ref 149–390)
PMV BLD AUTO: 11.5 FL (ref 8.9–12.7)
POTASSIUM SERPL-SCNC: 4.3 MMOL/L (ref 3.5–5.3)
PROT SERPL-MCNC: 7.8 G/DL (ref 6.4–8.2)
RBC # BLD AUTO: 4.57 MILLION/UL (ref 3.81–5.12)
SODIUM SERPL-SCNC: 140 MMOL/L (ref 136–145)
TSH SERPL DL<=0.05 MIU/L-ACNC: 1.17 UIU/ML (ref 0.36–3.74)
WBC # BLD AUTO: 4.31 THOUSAND/UL (ref 4.31–10.16)

## 2022-03-09 PROCEDURE — 1125F AMNT PAIN NOTED PAIN PRSNT: CPT | Performed by: FAMILY MEDICINE

## 2022-03-09 PROCEDURE — 84443 ASSAY THYROID STIM HORMONE: CPT

## 2022-03-09 PROCEDURE — G0439 PPPS, SUBSEQ VISIT: HCPCS | Performed by: FAMILY MEDICINE

## 2022-03-09 PROCEDURE — 80053 COMPREHEN METABOLIC PANEL: CPT

## 2022-03-09 PROCEDURE — 1170F FXNL STATUS ASSESSED: CPT | Performed by: FAMILY MEDICINE

## 2022-03-09 PROCEDURE — 1036F TOBACCO NON-USER: CPT | Performed by: FAMILY MEDICINE

## 2022-03-09 PROCEDURE — 3725F SCREEN DEPRESSION PERFORMED: CPT | Performed by: FAMILY MEDICINE

## 2022-03-09 PROCEDURE — 1003F LEVEL OF ACTIVITY ASSESS: CPT | Performed by: FAMILY MEDICINE

## 2022-03-09 PROCEDURE — 1090F PRES/ABSN URINE INCON ASSESS: CPT | Performed by: FAMILY MEDICINE

## 2022-03-09 PROCEDURE — 36415 COLL VENOUS BLD VENIPUNCTURE: CPT

## 2022-03-09 PROCEDURE — 83970 ASSAY OF PARATHORMONE: CPT

## 2022-03-09 PROCEDURE — 1160F RVW MEDS BY RX/DR IN RCRD: CPT | Performed by: FAMILY MEDICINE

## 2022-03-09 PROCEDURE — 85025 COMPLETE CBC W/AUTO DIFF WBC: CPT

## 2022-03-09 PROCEDURE — 3008F BODY MASS INDEX DOCD: CPT | Performed by: FAMILY MEDICINE

## 2022-03-09 PROCEDURE — 3288F FALL RISK ASSESSMENT DOCD: CPT | Performed by: FAMILY MEDICINE

## 2022-03-09 PROCEDURE — 99214 OFFICE O/P EST MOD 30 MIN: CPT | Performed by: FAMILY MEDICINE

## 2022-03-09 NOTE — PATIENT INSTRUCTIONS
Medicare Preventive Visit Patient Instructions  Thank you for completing your Welcome to Medicare Visit or Medicare Annual Wellness Visit today  Your next wellness visit will be due in one year (3/10/2023)  The screening/preventive services that you may require over the next 5-10 years are detailed below  Some tests may not apply to you based off risk factors and/or age  Screening tests ordered at today's visit but not completed yet may show as past due  Also, please note that scanned in results may not display below  Preventive Screenings:  Service Recommendations Previous Testing/Comments   Colorectal Cancer Screening  * Colonoscopy    * Fecal Occult Blood Test (FOBT)/Fecal Immunochemical Test (FIT)  * Fecal DNA/Cologuard Test  * Flexible Sigmoidoscopy Age: 54-65 years old   Colonoscopy: every 10 years (may be performed more frequently if at higher risk)  OR  FOBT/FIT: every 1 year  OR  Cologuard: every 3 years  OR  Sigmoidoscopy: every 5 years  Screening may be recommended earlier than age 48 if at higher risk for colorectal cancer  Also, an individualized decision between you and your healthcare provider will decide whether screening between the ages of 74-80 would be appropriate  Colonoscopy: 07/17/2018  FOBT/FIT: Not on file  Cologuard: Not on file  Sigmoidoscopy: Not on file          Breast Cancer Screening Age: 36 years old  Frequency: every 1-2 years  Not required if history of left and right mastectomy Mammogram: 07/12/2021        Cervical Cancer Screening Between the ages of 21-29, pap smear recommended once every 3 years  Between the ages of 33-67, can perform pap smear with HPV co-testing every 5 years     Recommendations may differ for women with a history of total hysterectomy, cervical cancer, or abnormal pap smears in past  Pap Smear: 07/02/2020        Hepatitis C Screening Once for adults born between 1945 and 1965  More frequently in patients at high risk for Hepatitis C Hep C Antibody: 01/23/2019        Diabetes Screening 1-2 times per year if you're at risk for diabetes or have pre-diabetes Fasting glucose: 109 mg/dL   A1C: 5 6 %        Cholesterol Screening Once every 5 years if you don't have a lipid disorder  May order more often based on risk factors  Lipid panel: 09/10/2021          Other Preventive Screenings Covered by Medicare:  1  Abdominal Aortic Aneurysm (AAA) Screening: covered once if your at risk  You're considered to be at risk if you have a family history of AAA  2  Lung Cancer Screening: covers low dose CT scan once per year if you meet all of the following conditions: (1) Age 50-69; (2) No signs or symptoms of lung cancer; (3) Current smoker or have quit smoking within the last 15 years; (4) You have a tobacco smoking history of at least 30 pack years (packs per day multiplied by number of years you smoked); (5) You get a written order from a healthcare provider  3  Glaucoma Screening: covered annually if you're considered high risk: (1) You have diabetes OR (2) Family history of glaucoma OR (3)  aged 48 and older OR (3)  American aged 72 and older  3  Osteoporosis Screening: covered every 2 years if you meet one of the following conditions: (1) You're estrogen deficient and at risk for osteoporosis based off medical history and other findings; (2) Have a vertebral abnormality; (3) On glucocorticoid therapy for more than 3 months; (4) Have primary hyperparathyroidism; (5) On osteoporosis medications and need to assess response to drug therapy  · Last bone density test (DXA Scan): 07/12/2021   5  HIV Screening: covered annually if you're between the age of 15-65  Also covered annually if you are younger than 13 and older than 72 with risk factors for HIV infection  For pregnant patients, it is covered up to 3 times per pregnancy      Immunizations:  Immunization Recommendations   Influenza Vaccine Annual influenza vaccination during flu season is recommended for all persons aged >= 6 months who do not have contraindications   Pneumococcal Vaccine (Prevnar and Pneumovax)  * Prevnar = PCV13  * Pneumovax = PPSV23   Adults 25-60 years old: 1-3 doses may be recommended based on certain risk factors  Adults 72 years old: Prevnar (PCV13) vaccine recommended followed by Pneumovax (PPSV23) vaccine  If already received PPSV23 since turning 65, then PCV13 recommended at least one year after PPSV23 dose  Hepatitis B Vaccine 3 dose series if at intermediate or high risk (ex: diabetes, end stage renal disease, liver disease)   Tetanus (Td) Vaccine - COST NOT COVERED BY MEDICARE PART B Following completion of primary series, a booster dose should be given every 10 years to maintain immunity against tetanus  Td may also be given as tetanus wound prophylaxis  Tdap Vaccine - COST NOT COVERED BY MEDICARE PART B Recommended at least once for all adults  For pregnant patients, recommended with each pregnancy  Shingles Vaccine (Shingrix) - COST NOT COVERED BY MEDICARE PART B  2 shot series recommended in those aged 48 and above     Health Maintenance Due:      Topic Date Due    Cervical Cancer Screening  04/09/2021    Breast Cancer Screening: Mammogram  07/12/2022    DXA SCAN  07/12/2024    Colorectal Cancer Screening  07/17/2028    Hepatitis C Screening  Completed     Immunizations Due:      Topic Date Due    DTaP,Tdap,and Td Vaccines (1 - Tdap) 01/24/2019     Advance Directives   What are advance directives? Advance directives are legal documents that state your wishes and plans for medical care  These plans are made ahead of time in case you lose your ability to make decisions for yourself  Advance directives can apply to any medical decision, such as the treatments you want, and if you want to donate organs  What are the types of advance directives? There are many types of advance directives, and each state has rules about how to use them   You may choose a combination of any of the following:  · Living will: This is a written record of the treatment you want  You can also choose which treatments you do not want, which to limit, and which to stop at a certain time  This includes surgery, medicine, IV fluid, and tube feedings  · Durable power of  for healthcare [place] [facility]): This is a written record that states who you want to make healthcare choices for you when you are unable to make them for yourself  This person, called a proxy, is usually a family member or a friend  You may choose more than 1 proxy  · Do not resuscitate (DNR) order:  A DNR order is used in case your heart stops beating or you stop breathing  It is a request not to have certain forms of treatment, such as CPR  A DNR order may be included in other types of advance directives  · Medical directive: This covers the care that you want if you are in a coma, near death, or unable to make decisions for yourself  You can list the treatments you want for each condition  Treatment may include pain medicine, surgery, blood transfusions, dialysis, IV or tube feedings, and a ventilator (breathing machine)  · Values history: This document has questions about your views, beliefs, and how you feel and think about life  This information can help others choose the care that you would choose  Why are advance directives important? An advance directive helps you control your care  Although spoken wishes may be used, it is better to have your wishes written down  Spoken wishes can be misunderstood, or not followed  Treatments may be given even if you do not want them  An advance directive may make it easier for your family to make difficult choices about your care  Weight Management   Why it is important to manage your weight:  Being overweight increases your risk of health conditions such as heart disease, high blood pressure, type 2 diabetes, and certain types of cancer   It can also increase your risk for osteoarthritis, sleep apnea, and other respiratory problems  Aim for a slow, steady weight loss  Even a small amount of weight loss can lower your risk of health problems  How to lose weight safely:  A safe and healthy way to lose weight is to eat fewer calories and get regular exercise  You can lose up about 1 pound a week by decreasing the number of calories you eat by 500 calories each day  Healthy meal plan for weight management:  A healthy meal plan includes a variety of foods, contains fewer calories, and helps you stay healthy  A healthy meal plan includes the following:  · Eat whole-grain foods more often  A healthy meal plan should contain fiber  Fiber is the part of grains, fruits, and vegetables that is not broken down by your body  Whole-grain foods are healthy and provide extra fiber in your diet  Some examples of whole-grain foods are whole-wheat breads and pastas, oatmeal, brown rice, and bulgur  · Eat a variety of vegetables every day  Include dark, leafy greens such as spinach, kale, yannick greens, and mustard greens  Eat yellow and orange vegetables such as carrots, sweet potatoes, and winter squash  · Eat a variety of fruits every day  Choose fresh or canned fruit (canned in its own juice or light syrup) instead of juice  Fruit juice has very little or no fiber  · Eat low-fat dairy foods  Drink fat-free (skim) milk or 1% milk  Eat fat-free yogurt and low-fat cottage cheese  Try low-fat cheeses such as mozzarella and other reduced-fat cheeses  · Choose meat and other protein foods that are low in fat  Choose beans or other legumes such as split peas or lentils  Choose fish, skinless poultry (chicken or turkey), or lean cuts of red meat (beef or pork)  Before you cook meat or poultry, cut off any visible fat  · Use less fat and oil  Try baking foods instead of frying them  Add less fat, such as margarine, sour cream, regular salad dressing and mayonnaise to foods   Eat fewer high-fat foods  Some examples of high-fat foods include french fries, doughnuts, ice cream, and cakes  · Eat fewer sweets  Limit foods and drinks that are high in sugar  This includes candy, cookies, regular soda, and sweetened drinks  Exercise:  Exercise at least 30 minutes per day on most days of the week  Some examples of exercise include walking, biking, dancing, and swimming  You can also fit in more physical activity by taking the stairs instead of the elevator or parking farther away from stores  Ask your healthcare provider about the best exercise plan for you  Alcohol Use and Your Health    Drinking too much can harm your health  Excessive alcohol use leads to about 88,000 death in the [place] each year, and shortens the life of those who diet by almost 30 years  Further, excessive drinking cost the economy $249 billion in 2010  Most excessive drinkers are not alcohol dependent  Excessive alcohol use has immediate effects that increase the risk of many harmful health conditions  These are most often the result of binge drinking  Over time, excessive alcohol use can lead to the development of chronic diseases and other series health problems  What is considered a "drink"? Excessive alcohol use includes:  · Binge Drinking: For women, 4 or more drinks consumed on one occasion  For men, 5 or more drinks consumed on one occasion  · Heavy Drinking: For women, 8 or more drinks per week  For men, 15 or more drinks per week  · Any alcohol used by pregnant women  · Any alcohol used by those under the age of 21 years    If you choose to drink, do so in moderation:  · Do not drink at all if you are under the age of 24, or if you are or may be pregnant, or have health problems that could be made worse by drinking    · For women, up to 1 drink per day  · For men, up to 2 drinks a day    No one should begin drinking or drink more frequently based on potential health benefits    Short-Term Health Risks:  · Injuries: motor vehicle crashes, falls, drownings, burns  · Violence: homicide, suicide, sexual assault, intimate partner violence  · Alcohol poisoning  · Reproductive health: risky sexual behaviors, unintended prengnacy, sexually transmitted diseases, miscarriage, stillbirth, fetal alcohol syndrome    Long-Term Health Risks:  · Chronic diseases: high blood pressure, heart disease, stroke, liver disease, digestive problems  · Cancers: breast, mouth and throat, liver, colon  · Learning and memory problems: dementia, poor school performance  · Mental health: depression, anxiety, insomnia  · Social problems: lost productivity, family problems, unemployment  · Alcohol dependence    For support and more information:  · Substance Abuse and [facility] [address] [place]  Web Address: https://OffersBy.Me/    · Alcoholics Anonymous        Web Address: http://Teedot/    https://www cdc gov/alcohol/fact-sheets/alcohol-use htm     © Copyright [employer] 2018 Information is for End User's use only and may not be sold, redistributed or otherwise used for commercial purposes  All illustrations and images included in CareNotes® are the copyrighted property of [employer]  or [facility] Preventive Visit Patient Instructions  Thank you for completing your Welcome to Medicare Visit or Medicare Annual Wellness Visit today  Your next wellness visit will be due in one year (3/10/2023)  The screening/preventive services that you may require over the next 5-10 years are detailed below  Some tests may not apply to you based off risk factors and/or age  Screening tests ordered at today's visit but not completed yet may show as past due  Also, please note that scanned in results may not display below    Preventive Screenings:  Service Recommendations Previous Testing/Comments   Colorectal Cancer Screening  * Colonoscopy    * Fecal Occult Blood Test (FOBT)/Fecal Immunochemical Test (FIT)  * Fecal DNA/Cologuard Test  * Flexible Sigmoidoscopy Age: 54-65 years old   Colonoscopy: every 10 years (may be performed more frequently if at higher risk)  OR  FOBT/FIT: every 1 year  OR  Cologuard: every 3 years  OR  Sigmoidoscopy: every 5 years  Screening may be recommended earlier than age 48 if at higher risk for colorectal cancer  Also, an individualized decision between you and your healthcare provider will decide whether screening between the ages of 74-80 would be appropriate  Colonoscopy: 07/17/2018  FOBT/FIT: Not on file  Cologuard: Not on file  Sigmoidoscopy: Not on file    Screening Current     Breast Cancer Screening Age: 36 years old  Frequency: every 1-2 years  Not required if history of left and right mastectomy Mammogram: 07/12/2021    Screening Current   Cervical Cancer Screening Between the ages of 21-29, pap smear recommended once every 3 years  Between the ages of 33-67, can perform pap smear with HPV co-testing every 5 years  Recommendations may differ for women with a history of total hysterectomy, cervical cancer, or abnormal pap smears in past  Pap Smear: 07/02/2020    Screening Not Indicated   Hepatitis C Screening Once for adults born between 1945 and 1965  More frequently in patients at high risk for Hepatitis C Hep C Antibody: 01/23/2019    Screening Current   Diabetes Screening 1-2 times per year if you're at risk for diabetes or have pre-diabetes Fasting glucose: 109 mg/dL   A1C: 5 6 %    Screening Current   Cholesterol Screening Once every 5 years if you don't have a lipid disorder  May order more often based on risk factors  Lipid panel: 09/10/2021    Screening Not Indicated  History Lipid Disorder     Other Preventive Screenings Covered by Medicare:  6  Abdominal Aortic Aneurysm (AAA) Screening: covered once if your at risk  You're considered to be at risk if you have a family history of AAA    7  Lung Cancer Screening: covers low dose CT scan once per year if you meet all of the following conditions: (1) Age 50-69; (2) No signs or symptoms of lung cancer; (3) Current smoker or have quit smoking within the last 15 years; (4) You have a tobacco smoking history of at least 30 pack years (packs per day multiplied by number of years you smoked); (5) You get a written order from a healthcare provider  8  Glaucoma Screening: covered annually if you're considered high risk: (1) You have diabetes OR (2) Family history of glaucoma OR (3)  aged 48 and older OR (3)  American aged 72 and older  5  Osteoporosis Screening: covered every 2 years if you meet one of the following conditions: (1) You're estrogen deficient and at risk for osteoporosis based off medical history and other findings; (2) Have a vertebral abnormality; (3) On glucocorticoid therapy for more than 3 months; (4) Have primary hyperparathyroidism; (5) On osteoporosis medications and need to assess response to drug therapy  · Last bone density test (DXA Scan): 07/12/2021  10  HIV Screening: covered annually if you're between the age of 12-76  Also covered annually if you are younger than 13 and older than 72 with risk factors for HIV infection  For pregnant patients, it is covered up to 3 times per pregnancy  Immunizations:  Immunization Recommendations   Influenza Vaccine Annual influenza vaccination during flu season is recommended for all persons aged >= 6 months who do not have contraindications   Pneumococcal Vaccine (Prevnar and Pneumovax)  * Prevnar = PCV13  * Pneumovax = PPSV23   Adults 25-60 years old: 1-3 doses may be recommended based on certain risk factors  Adults 72 years old: Prevnar (PCV13) vaccine recommended followed by Pneumovax (PPSV23) vaccine  If already received PPSV23 since turning 65, then PCV13 recommended at least one year after PPSV23 dose     Hepatitis B Vaccine 3 dose series if at intermediate or high risk (ex: diabetes, end stage renal disease, liver disease)   Tetanus (Td) Vaccine - COST NOT COVERED BY MEDICARE PART B Following completion of primary series, a booster dose should be given every 10 years to maintain immunity against tetanus  Td may also be given as tetanus wound prophylaxis  Tdap Vaccine - COST NOT COVERED BY MEDICARE PART B Recommended at least once for all adults  For pregnant patients, recommended with each pregnancy  Shingles Vaccine (Shingrix) - COST NOT COVERED BY MEDICARE PART B  2 shot series recommended in those aged 48 and above     Health Maintenance Due:      Topic Date Due    Cervical Cancer Screening  04/09/2021    Breast Cancer Screening: Mammogram  07/12/2022    DXA SCAN  07/12/2024    Colorectal Cancer Screening  07/17/2028    Hepatitis C Screening  Completed     Immunizations Due:      Topic Date Due    DTaP,Tdap,and Td Vaccines (1 - Tdap) 01/24/2019     Advance Directives   What are advance directives? Advance directives are legal documents that state your wishes and plans for medical care  These plans are made ahead of time in case you lose your ability to make decisions for yourself  Advance directives can apply to any medical decision, such as the treatments you want, and if you want to donate organs  What are the types of advance directives? There are many types of advance directives, and each state has rules about how to use them  You may choose a combination of any of the following:  · Living will: This is a written record of the treatment you want  You can also choose which treatments you do not want, which to limit, and which to stop at a certain time  This includes surgery, medicine, IV fluid, and tube feedings  · Durable power of  for healthcare [facility] [facility]): This is a written record that states who you want to make healthcare choices for you when you are unable to make them for yourself  This person, called a proxy, is usually a family member or a friend   You may choose more than 1 proxy  · Do not resuscitate (DNR) order:  A DNR order is used in case your heart stops beating or you stop breathing  It is a request not to have certain forms of treatment, such as CPR  A DNR order may be included in other types of advance directives  · Medical directive: This covers the care that you want if you are in a coma, near death, or unable to make decisions for yourself  You can list the treatments you want for each condition  Treatment may include pain medicine, surgery, blood transfusions, dialysis, IV or tube feedings, and a ventilator (breathing machine)  · Values history: This document has questions about your views, beliefs, and how you feel and think about life  This information can help others choose the care that you would choose  Why are advance directives important? An advance directive helps you control your care  Although spoken wishes may be used, it is better to have your wishes written down  Spoken wishes can be misunderstood, or not followed  Treatments may be given even if you do not want them  An advance directive may make it easier for your family to make difficult choices about your care  Weight Management   Why it is important to manage your weight:  Being overweight increases your risk of health conditions such as heart disease, high blood pressure, type 2 diabetes, and certain types of cancer  It can also increase your risk for osteoarthritis, sleep apnea, and other respiratory problems  Aim for a slow, steady weight loss  Even a small amount of weight loss can lower your risk of health problems  How to lose weight safely:  A safe and healthy way to lose weight is to eat fewer calories and get regular exercise  You can lose up about 1 pound a week by decreasing the number of calories you eat by 500 calories each day  Healthy meal plan for weight management:  A healthy meal plan includes a variety of foods, contains fewer calories, and helps you stay healthy   A healthy meal plan includes the following:  · Eat whole-grain foods more often  A healthy meal plan should contain fiber  Fiber is the part of grains, fruits, and vegetables that is not broken down by your body  Whole-grain foods are healthy and provide extra fiber in your diet  Some examples of whole-grain foods are whole-wheat breads and pastas, oatmeal, brown rice, and bulgur  · Eat a variety of vegetables every day  Include dark, leafy greens such as spinach, kale, yannick greens, and mustard greens  Eat yellow and orange vegetables such as carrots, sweet potatoes, and winter squash  · Eat a variety of fruits every day  Choose fresh or canned fruit (canned in its own juice or light syrup) instead of juice  Fruit juice has very little or no fiber  · Eat low-fat dairy foods  Drink fat-free (skim) milk or 1% milk  Eat fat-free yogurt and low-fat cottage cheese  Try low-fat cheeses such as mozzarella and other reduced-fat cheeses  · Choose meat and other protein foods that are low in fat  Choose beans or other legumes such as split peas or lentils  Choose fish, skinless poultry (chicken or turkey), or lean cuts of red meat (beef or pork)  Before you cook meat or poultry, cut off any visible fat  · Use less fat and oil  Try baking foods instead of frying them  Add less fat, such as margarine, sour cream, regular salad dressing and mayonnaise to foods  Eat fewer high-fat foods  Some examples of high-fat foods include french fries, doughnuts, ice cream, and cakes  · Eat fewer sweets  Limit foods and drinks that are high in sugar  This includes candy, cookies, regular soda, and sweetened drinks  Exercise:  Exercise at least 30 minutes per day on most days of the week  Some examples of exercise include walking, biking, dancing, and swimming  You can also fit in more physical activity by taking the stairs instead of the elevator or parking farther away from stores   Ask your healthcare provider about the best exercise plan for you  Alcohol Use and Your Health    Drinking too much can harm your health  Excessive alcohol use leads to about 88,000 death in the [place] each year, and shortens the life of those who diet by almost 30 years  Further, excessive drinking cost the economy $249 billion in 2010  Most excessive drinkers are not alcohol dependent  Excessive alcohol use has immediate effects that increase the risk of many harmful health conditions  These are most often the result of binge drinking  Over time, excessive alcohol use can lead to the development of chronic diseases and other series health problems  What is considered a "drink"? Excessive alcohol use includes:  · Binge Drinking: For women, 4 or more drinks consumed on one occasion  For men, 5 or more drinks consumed on one occasion  · Heavy Drinking: For women, 8 or more drinks per week  For men, 15 or more drinks per week  · Any alcohol used by pregnant women  · Any alcohol used by those under the age of 21 years    If you choose to drink, do so in moderation:  · Do not drink at all if you are under the age of 24, or if you are or may be pregnant, or have health problems that could be made worse by drinking    · For women, up to 1 drink per day  · For men, up to 2 drinks a day    No one should begin drinking or drink more frequently based on potential health benefits    Short-Term Health Risks:  · Injuries: motor vehicle crashes, falls, drownings, burns  · Violence: homicide, suicide, sexual assault, intimate partner violence  · Alcohol poisoning  · Reproductive health: risky sexual behaviors, unintended prengnacy, sexually transmitted diseases, miscarriage, stillbirth, fetal alcohol syndrome    Long-Term Health Risks:  · Chronic diseases: high blood pressure, heart disease, stroke, liver disease, digestive problems  · Cancers: breast, mouth and throat, liver, colon  · Learning and memory problems: dementia, poor school performance  · Mental health: depression, anxiety, insomnia  · Social problems: lost productivity, family problems, unemployment  · Alcohol dependence    For support and more information:  · Substance Abuse and [facility] , [address] [place]  Web Address: https://EyeEm/    · Alcoholics Anonymous        Web Address: http://www crespo info/    https://www cdc gov/alcohol/fact-sheets/alcohol-use htm     © [address] 2018 Information is for End User's use only and may not be sold, redistributed or otherwise used for commercial purposes   All illustrations and images included in CareNotes® are the copyrighted property of A D A M , Inc  or [address] [place] [address]

## 2022-03-09 NOTE — PROGRESS NOTES
Assessment/Plan:    Esophageal reflux  Stable  Using omeprazole prn  Continue to monitor diet  F/u with GI  Recheck 6m    Hypothyroidism  Appears to be stable clinically  Check TSH  Adjust meds if TSH is not at goal  Recheck 6m      Hyperlipidemia  Has been stable on atorvastatin  Continue present care  Check labs  Monitor diet  Recheck 6m    Anxiety  Stable at present  Continue to watch  Recheck 6m       Diagnoses and all orders for this visit:    Medicare annual wellness visit, subsequent    Gastroesophageal reflux disease, unspecified whether esophagitis present  -     CBC and differential; Future  -     Comprehensive metabolic panel; Future    Hypothyroidism due to Hashimoto's thyroiditis  -     TSH, 3rd generation; Future    Mixed hyperlipidemia    Anxiety          Subjective:      Patient ID: Haley Broussard is a 78 y o  female  f/u multiple med issues and AWV  - pt feels well     - mood stable  Still worries about her 's health  - pt has started walking again  Pt denies CP, palp, lightheadedness or other CV symptoms with or without exertion  - no new GI or Gu issues  To have colonoscopy and EGD in 2 weeks  Up to date with mammo  - no new extremity complaints  - AWV done      The following portions of the patient's history were reviewed and updated as appropriate:   She  has a past medical history of Acid reflux, Basal cell carcinoma, Colon polyp, Dental bridge present, Disease of thyroid gland, GERD (gastroesophageal reflux disease), Hashimoto's disease, High cholesterol, Osteopenia, Seasonal allergies, Thyroid disease, and Wears glasses    She   Patient Active Problem List    Diagnosis Date Noted    Anxiety 08/10/2020    Encounter for gynecological examination (general) (routine) without abnormal findings 07/02/2020    White coat syndrome with high blood pressure without hypertension 04/12/2017    Basal cell carcinoma of skin of nose 08/16/2016    Esophageal reflux 09/26/2012    Hyperlipidemia 09/26/2012    Hypothyroidism 09/26/2012    Nontoxic single thyroid nodule 09/26/2012     She  has a past surgical history that includes Skin cancer excision; Tonsillectomy; Esophagogastroduodenoscopy; Colonoscopy; Skin biopsy; MOHS RECONSTRUCTION (N/A, 10/4/2016); pr exc skin benig >4 cm trunk,arm,leg (N/A, 3/19/2019); pr recmpl wnd trunk 2 6-7 5 cm (N/A, 3/19/2019); and Upper gastrointestinal endoscopy  She  reports that she has quit smoking  She has a 20 00 pack-year smoking history  She has never used smokeless tobacco  She reports current alcohol use  She reports that she does not use drugs  Current Outpatient Medications   Medication Sig Dispense Refill    ascorbic acid (VITAMIN C) 500 mg tablet Take 500 mg by mouth daily   atorvastatin (LIPITOR) 10 mg tablet TAKE 1 TABLET BY MOUTH  DAILY 90 tablet 3    Biotin 89981 MCG TABS Take by mouth      Calcium Carbonate-Vit D-Min (CALTRATE 600+D PLUS MINERALS PO) Take by mouth daily   famotidine (PEPCID) 20 mg tablet TAKE 1 TABLET BY MOUTH  TWICE DAILY 180 tablet 3    levothyroxine 88 mcg tablet TAKE 1 TABLET BY MOUTH  DAILY 90 tablet 3    loratadine (CLARITIN) 10 mg tablet Take 10 mg by mouth daily      LORazepam (ATIVAN) 1 mg tablet Take 1 tablet (1 mg total) by mouth daily at bedtime 30 tablet 1    mometasone (NASONEX) 50 mcg/act nasal spray USE 2 SPRAYS IN BOTH  NOSTRILS DAILY 51 g 1    Multiple Vitamins-Minerals (CENTRUM SILVER PO) Take by mouth daily   Sodium Fluoride 5000 Sensitive 1 1-5 % PSTE        No current facility-administered medications for this visit  She is allergic to avelox [moxifloxacin] and augmentin [amoxicillin-pot clavulanate]       Review of Systems   Constitutional: Negative  HENT: Negative  Eyes: Negative  Respiratory: Negative  Cardiovascular: Negative  Gastrointestinal: Negative  Endocrine: Negative  Genitourinary: Negative  Musculoskeletal: Negative  Skin: Negative  Allergic/Immunologic: Negative  Neurological: Negative  Hematological: Negative  Psychiatric/Behavioral: Negative  Objective:      /82   Pulse 76   Temp 98 2 °F (36 8 °C)   Ht 5' 3" (1 6 m)   Wt 74 4 kg (164 lb)   BMI 29 05 kg/m²          Physical Exam  Vitals reviewed  Constitutional:       Appearance: She is well-developed  She is not diaphoretic  HENT:      Head: Normocephalic and atraumatic  Right Ear: Tympanic membrane, ear canal and external ear normal       Left Ear: Tympanic membrane, ear canal and external ear normal    Eyes:      Extraocular Movements: Extraocular movements intact  Conjunctiva/sclera: Conjunctivae normal       Pupils: Pupils are equal, round, and reactive to light  Neck:      Thyroid: No thyromegaly  Vascular: No JVD  Cardiovascular:      Rate and Rhythm: Normal rate and regular rhythm  Pulses: Normal pulses  Heart sounds: No murmur heard  Pulmonary:      Effort: Pulmonary effort is normal       Breath sounds: Normal breath sounds  Abdominal:      General: There is no distension  Palpations: Abdomen is soft  There is no mass  Tenderness: There is no abdominal tenderness  Musculoskeletal:         General: No swelling or tenderness  Normal range of motion  Cervical back: Normal range of motion and neck supple  No tenderness  No muscular tenderness  Right lower leg: No edema  Left lower leg: No edema  Lymphadenopathy:      Cervical: No cervical adenopathy  Skin:     General: Skin is warm and dry  Capillary Refill: Capillary refill takes less than 2 seconds  Neurological:      Mental Status: She is alert and oriented to person, place, and time  Cranial Nerves: No cranial nerve deficit  Sensory: No sensory deficit  Motor: No weakness or abnormal muscle tone  Gait: Gait normal       Deep Tendon Reflexes: Reflexes are normal and symmetric        Comments: minicog 5/5 Psychiatric:         Mood and Affect: Mood normal          Behavior: Behavior normal          Thought Content:  Thought content normal          Judgment: Judgment normal       Comments: PHQ-2/9 Depression Screening    Little interest or pleasure in doing things: 0 - not at all  Feeling down, depressed, or hopeless: 0 - not at all  PHQ-2 Score: 0  PHQ-2 Interpretation: Negative depression screen

## 2022-03-09 NOTE — PROGRESS NOTES
Assessment and Plan:     Problem List Items Addressed This Visit        Digestive    Esophageal reflux     Stable  Using omeprazole prn  Continue to monitor diet  F/u with GI  Recheck 6m         Relevant Orders    CBC and differential    Comprehensive metabolic panel       Endocrine    Hypothyroidism     Appears to be stable clinically  Check TSH  Adjust meds if TSH is not at goal  Recheck 6m           Relevant Orders    TSH, 3rd generation       Other    Anxiety     Stable at present  Continue to watch  Recheck 6m         Hyperlipidemia     Has been stable on atorvastatin  Continue present care  Check labs  Monitor diet  Recheck 6m           Other Visit Diagnoses     Medicare annual wellness visit, subsequent    -  Primary           Preventive health issues were discussed with patient, and age appropriate screening tests were ordered as noted in patient's After Visit Summary  Personalized health advice and appropriate referrals for health education or preventive services given if needed, as noted in patient's After Visit Summary       History of Present Illness:     Patient presents for Medicare Annual Wellness visit    Patient Care Team:  Asher Clark MD as PCP - General  Renetta Montejo MD     Problem List:     Patient Active Problem List   Diagnosis    Esophageal reflux    Hyperlipidemia    Hypothyroidism    White coat syndrome with high blood pressure without hypertension    Basal cell carcinoma of skin of nose    Nontoxic single thyroid nodule    Encounter for gynecological examination (general) (routine) without abnormal findings    Anxiety      Past Medical and Surgical History:     Past Medical History:   Diagnosis Date    Acid reflux     Basal cell carcinoma     basal cell on back and nose    Colon polyp     Dental bridge present     upper    Disease of thyroid gland     GERD (gastroesophageal reflux disease)     Hashimoto's disease     High cholesterol     Osteopenia     Seasonal allergies     mold,pollen    Thyroid disease     thyroid nodules    Wears glasses      Past Surgical History:   Procedure Laterality Date    COLONOSCOPY      ESOPHAGOGASTRODUODENOSCOPY      MOHS RECONSTRUCTION N/A 10/4/2016    Procedure: RECONSTRUCTION MOHS DEFECT NASAL TIP WITH FLAP;  Surgeon: Breann Maddox MD;  [facility];  Service:     MS EXC SKIN BENIG >4 CM TRUNK,ARM,LEG N/A 3/19/2019    Procedure: MID UPPER BACK LIPOMA EXCISION;  Surgeon: Breann Maddox MD;  [facility];  Service: Plastics    MS RECMPL WND TRUNK 2 6-7 5 CM N/A 3/19/2019    Procedure: COMPLEX CLOSURE;  Surgeon: Breann Maddox MD;  [facility];  Service: Plastics    SKIN BIOPSY      SKIN CANCER EXCISION      basal cell CA on back and nose-2009 and 2016    TONSILLECTOMY      UPPER GASTROINTESTINAL ENDOSCOPY        Family History:     Family History   Problem Relation Age of Onset    Colon cancer Mother 61    Pancreatic cancer Father 62    Thyroid cancer Father 48    Coronary artery disease Paternal Grandmother     Breast cancer Cousin 54    No Known Problems Maternal Aunt     No Known Problems Sister       Social History:     Social History     Socioeconomic History    Marital status: /Civil Union     Spouse name: None    Number of children: None    Years of education: None    Highest education level: None   Occupational History    None   Tobacco Use    Smoking status: Former Smoker     Packs/day: 1 00     Years: 20 00     Pack years: 20 00    Smokeless tobacco: Never Used    Tobacco comment: quit 30 yrs ago   Vaping Use    Vaping Use: Never used   Substance and Sexual Activity    Alcohol use: Yes     Comment: 1 glass a night     Drug use: No    Sexual activity: Never   Other Topics Concern    None   Social History Narrative    Daily coffee consumption; 1 cp/day    Daily cola consumption; ____ cp/day    Daily tea consumption; 1 cp/day    No domestic violence history    Uses seatbelts     Social Determinants of Health     Financial Resource Strain: Not on file   Food Insecurity: Not on file   Transportation Needs: Not on file   Physical Activity: Not on file   Stress: Not on file   Social Connections: Not on file   Intimate Partner Violence: Not on file   Housing Stability: Not on file      Medications and Allergies:     Current Outpatient Medications   Medication Sig Dispense Refill    ascorbic acid (VITAMIN C) 500 mg tablet Take 500 mg by mouth daily   atorvastatin (LIPITOR) 10 mg tablet TAKE 1 TABLET BY MOUTH  DAILY 90 tablet 3    Biotin 40745 MCG TABS Take by mouth      Calcium Carbonate-Vit D-Min (CALTRATE 600+D PLUS MINERALS PO) Take by mouth daily   famotidine (PEPCID) 20 mg tablet TAKE 1 TABLET BY MOUTH  TWICE DAILY 180 tablet 3    levothyroxine 88 mcg tablet TAKE 1 TABLET BY MOUTH  DAILY 90 tablet 3    loratadine (CLARITIN) 10 mg tablet Take 10 mg by mouth daily      LORazepam (ATIVAN) 1 mg tablet Take 1 tablet (1 mg total) by mouth daily at bedtime 30 tablet 1    mometasone (NASONEX) 50 mcg/act nasal spray USE 2 SPRAYS IN BOTH  NOSTRILS DAILY 51 g 1    Multiple Vitamins-Minerals (CENTRUM SILVER PO) Take by mouth daily   Sodium Fluoride 5000 Sensitive 1 1-5 % PSTE        No current facility-administered medications for this visit       Allergies   Allergen Reactions    Avelox [Moxifloxacin] Hives    Augmentin [Amoxicillin-Pot Clavulanate] Rash      Immunizations:     Immunization History   Administered Date(s) Administered    COVID-19 PFIZER VACCINE 0 3 ML IM 02/17/2021, 03/08/2021, 10/02/2021    Influenza Split High Dose Preservative Free IM 10/16/2012, 10/03/2013, 10/14/2014, 09/22/2015, 09/14/2016, 08/30/2017    Influenza, high dose seasonal 0 7 mL 09/25/2018, 09/26/2019, 10/07/2020, 09/07/2021    Influenza, seasonal, injectable 09/01/2011    Pneumococcal Conjugate 13-Valent 12/01/2016    Pneumococcal Polysaccharide PPV23 11/06/2013    TD (adult) Preservative Free 01/23/2019    Tuberculin Skin Test-PPD Intradermal 04/20/2004      Health Maintenance:         Topic Date Due    Cervical Cancer Screening  04/09/2021    Breast Cancer Screening: Mammogram  07/12/2022    DXA SCAN  07/12/2024    Colorectal Cancer Screening  07/17/2028    Hepatitis C Screening  Completed         Topic Date Due    DTaP,Tdap,and Td Vaccines (1 - Tdap) 01/24/2019      Medicare Health Risk Assessment:     /82   Pulse 76   Temp 98 2 °F (36 8 °C)   Ht 5' 3" (1 6 m)   Wt 74 4 kg (164 lb)   BMI 29 05 kg/m²      Irvin Lala is here for her Subsequent Wellness visit  Last Medicare Wellness visit information reviewed, patient interviewed and updates made to the record today  Health Risk Assessment:   Patient rates overall health as very good  Patient feels that their physical health rating is same  Patient is very satisfied with their life  Eyesight was rated as same  Hearing was rated as slightly worse  Patient feels that their emotional and mental health rating is same  Patients states they are never, rarely angry  Patient states they are never, rarely unusually tired/fatigued  Pain experienced in the last 7 days has been none  Patient states that she has experienced no weight loss or gain in last 6 months  Depression Screening:   PHQ-2 Score: 0      Fall Risk Screening: In the past year, patient has experienced: no history of falling in past year      Urinary Incontinence Screening:   Patient has not leaked urine accidently in the last six months  Home Safety:  Patient does not have trouble with stairs inside or outside of their home  Patient has working smoke alarms and has working carbon monoxide detector  Home safety hazards include: none  Nutrition:   Current diet is Regular  Medications:   Patient is not currently taking any over-the-counter supplements  Patient is able to manage medications       Activities of Daily Living (ADLs)/Instrumental Activities of Daily Living (IADLs):   Walk and transfer into and out of bed and chair?: Yes  Dress and groom yourself?: Yes    Bathe or shower yourself?: Yes    Feed yourself? Yes  Do your laundry/housekeeping?: Yes  Manage your money, pay your bills and track your expenses?: Yes  Make your own meals?: Yes    Do your own shopping?: Yes    Previous Hospitalizations:   Any hospitalizations or ED visits within the last 12 months?: No      Advance Care Planning:   Living will: Yes    Durable POA for healthcare: Yes    Advanced directive: Yes    Advanced directive counseling given: Yes      Cognitive Screening:   Provider or family/friend/caregiver concerned regarding cognition?: No    PREVENTIVE SCREENINGS      Cardiovascular Screening:    General: Screening Not Indicated and History Lipid Disorder      Diabetes Screening:     General: Screening Current      Colorectal Cancer Screening:     General: Screening Current      Breast Cancer Screening:     General: Screening Current      Cervical Cancer Screening:    General: Screening Not Indicated      Osteoporosis Screening:    General: Screening Current      Abdominal Aortic Aneurysm (AAA) Screening:        General: Screening Not Indicated      Lung Cancer Screening:     General: Screening Not Indicated      Hepatitis C Screening:    General: Screening Current    Screening, Brief Intervention, and Referral to Treatment (SBIRT)    Screening  Typical number of drinks in a day: 1  Typical number of drinks in a week: 7  Interpretation: Low risk drinking behavior  AUDIT-C Screenin) How often did you have a drink containing alcohol in the past year? 4 or more times a week  2) How many drinks did you have on a typical day when you were drinking in the past year?  1 to 2  3) How often did you have 6 or more drinks on one occasion in the past year? never    AUDIT-C Score: 4  Interpretation: Score 3-12 (female): POSITIVE screen for alcohol misuse    AUDIT Screenin) How often during the last year have you found that you were not able to stop drinking once you had started? 0 - never  5) How often during the last year have you failed to do what was normally expected from you because of drinking? 0 - never  6) How often during the last year have you needed a first drink in the morning to get yourself going after a heavy drinking session? 0 - never  7) How often during the last year have you had a feeling of guilt or remorse after drinking? 0 - never  8) How often during the last year have you been unable to remember what happened the night before because you had been drinking? 0 - never  9) Have you or someone else been injured as a result of your drinking? 0 - no  10) Has a relative or friend or a doctor or another health worker been concerned about your drinking or suggested you cut down? 0 - no    AUDIT Score: 4  Interpretation: Low risk alcohol consumption    Single Item Drug Screening:  How often have you used an illegal drug (including marijuana) or a prescription medication for non-medical reasons in the past year? never    Single Item Drug Screen Score: 0  Interpretation: Negative screen for possible drug use disorder    Other Counseling Topics:   Calcium and vitamin D intake and regular weightbearing exercise  BMI Counseling: Body mass index is 29 05 kg/m²  The BMI is above normal  Nutrition recommendations include reducing portion sizes, moderation in carbohydrate intake, increasing intake of lean protein, reducing intake of saturated fat and trans fat and reducing intake of cholesterol  Exercise recommendations include exercising 3-5 times per week      Amanda Rivera MD

## 2022-03-10 LAB — PTH-INTACT SERPL-MCNC: 85.1 PG/ML (ref 18.4–80.1)

## 2022-03-16 ENCOUNTER — ANESTHESIA (OUTPATIENT)
Dept: ANESTHESIOLOGY | Facility: HOSPITAL | Age: 77
End: 2022-03-16

## 2022-03-16 ENCOUNTER — ANESTHESIA EVENT (OUTPATIENT)
Dept: ANESTHESIOLOGY | Facility: HOSPITAL | Age: 77
End: 2022-03-16

## 2022-03-23 ENCOUNTER — ANESTHESIA (OUTPATIENT)
Dept: GASTROENTEROLOGY | Facility: AMBULARY SURGERY CENTER | Age: 77
End: 2022-03-23

## 2022-03-23 ENCOUNTER — HOSPITAL ENCOUNTER (OUTPATIENT)
Dept: GASTROENTEROLOGY | Facility: AMBULARY SURGERY CENTER | Age: 77
Setting detail: OUTPATIENT SURGERY
Discharge: HOME | End: 2022-03-23
Attending: INTERNAL MEDICINE | Admitting: INTERNAL MEDICINE
Payer: COMMERCIAL

## 2022-03-23 ENCOUNTER — ANESTHESIA EVENT (OUTPATIENT)
Dept: GASTROENTEROLOGY | Facility: AMBULARY SURGERY CENTER | Age: 77
End: 2022-03-23

## 2022-03-23 VITALS
BODY MASS INDEX: 27.82 KG/M2 | RESPIRATION RATE: 18 BRPM | HEART RATE: 68 BPM | DIASTOLIC BLOOD PRESSURE: 81 MMHG | WEIGHT: 157 LBS | SYSTOLIC BLOOD PRESSURE: 152 MMHG | OXYGEN SATURATION: 98 % | HEIGHT: 63 IN | TEMPERATURE: 97.2 F

## 2022-03-23 DIAGNOSIS — K25.3 ACUTE GASTRIC ULCER WITHOUT HEMORRHAGE OR PERFORATION: Primary | ICD-10-CM

## 2022-03-23 DIAGNOSIS — Z83.71 FAMILY HISTORY OF COLONIC POLYPS: ICD-10-CM

## 2022-03-23 DIAGNOSIS — K21.9 GASTROESOPHAGEAL REFLUX DISEASE WITHOUT ESOPHAGITIS: ICD-10-CM

## 2022-03-23 DIAGNOSIS — K63.5 POLYP OF COLON, UNSPECIFIED PART OF COLON, UNSPECIFIED TYPE: ICD-10-CM

## 2022-03-23 DIAGNOSIS — R13.10 DYSPHAGIA, UNSPECIFIED TYPE: ICD-10-CM

## 2022-03-23 PROCEDURE — 45385 COLONOSCOPY W/LESION REMOVAL: CPT | Performed by: INTERNAL MEDICINE

## 2022-03-23 PROCEDURE — 88305 TISSUE EXAM BY PATHOLOGIST: CPT | Performed by: PATHOLOGY

## 2022-03-23 PROCEDURE — 43251 EGD REMOVE LESION SNARE: CPT | Performed by: INTERNAL MEDICINE

## 2022-03-23 RX ORDER — PROPOFOL 10 MG/ML
INJECTION, EMULSION INTRAVENOUS AS NEEDED
Status: DISCONTINUED | OUTPATIENT
Start: 2022-03-23 | End: 2022-03-23

## 2022-03-23 RX ORDER — SODIUM CHLORIDE, SODIUM LACTATE, POTASSIUM CHLORIDE, CALCIUM CHLORIDE 600; 310; 30; 20 MG/100ML; MG/100ML; MG/100ML; MG/100ML
INJECTION, SOLUTION INTRAVENOUS CONTINUOUS PRN
Status: DISCONTINUED | OUTPATIENT
Start: 2022-03-23 | End: 2022-03-23

## 2022-03-23 RX ORDER — PANTOPRAZOLE SODIUM 40 MG/1
40 TABLET, DELAYED RELEASE ORAL DAILY
Qty: 30 TABLET | Refills: 11 | Status: SHIPPED | OUTPATIENT
Start: 2022-03-23 | End: 2022-07-13 | Stop reason: SDUPTHER

## 2022-03-23 RX ADMIN — PROPOFOL 50 MG: 10 INJECTION, EMULSION INTRAVENOUS at 07:41

## 2022-03-23 RX ADMIN — SODIUM CHLORIDE, SODIUM LACTATE, POTASSIUM CHLORIDE, AND CALCIUM CHLORIDE: .6; .31; .03; .02 INJECTION, SOLUTION INTRAVENOUS at 07:31

## 2022-03-23 RX ADMIN — PROPOFOL 50 MG: 10 INJECTION, EMULSION INTRAVENOUS at 07:49

## 2022-03-23 RX ADMIN — PROPOFOL 100 MG: 10 INJECTION, EMULSION INTRAVENOUS at 07:35

## 2022-03-23 RX ADMIN — PROPOFOL 50 MG: 10 INJECTION, EMULSION INTRAVENOUS at 07:44

## 2022-03-23 NOTE — H&P
History and Physical -  [facility]  Jana Calix 78 y o  female MRN: [id number]        HPI:  81-year-old female with history of GERD reports having occasional difficulty swallowing  She has history of colon polyps and also family history of colon cancer in her mother  Regular bowel movements      Historical Information   Past Medical History:   Diagnosis Date    Acid reflux     Basal cell carcinoma     basal cell on back and nose    Colon polyp     Dental bridge present     upper    Disease of thyroid gland     GERD (gastroesophageal reflux disease)     Hashimoto's disease     High cholesterol     Osteopenia     Seasonal allergies     mold,pollen    Thyroid disease     thyroid nodules    Wears glasses      Past Surgical History:   Procedure Laterality Date    COLONOSCOPY      ESOPHAGOGASTRODUODENOSCOPY      MOHS RECONSTRUCTION N/A 10/4/2016    Procedure: RECONSTRUCTION MOHS DEFECT NASAL TIP WITH FLAP;  Surgeon: Babs Ellison MD;  [facility];  Service:     OK EXC SKIN BENIG >4 CM TRUNK,ARM,LEG N/A 3/19/2019    Procedure: MID UPPER BACK LIPOMA EXCISION;  Surgeon: Babs Ellison MD;  [facility];  Service: Plastics    OK RECMPL WND TRUNK 2 6-7 5 CM N/A 3/19/2019    Procedure: COMPLEX CLOSURE;  Surgeon: Babs Ellison MD;  [facility];  Service: Plastics    SKIN BIOPSY      SKIN CANCER EXCISION      basal cell CA on back and nose-2009 and 2016    TONSILLECTOMY      UPPER GASTROINTESTINAL ENDOSCOPY       Social History   Social History     Substance and Sexual Activity   Alcohol Use Yes    Comment: 1 glass a night      Social History     Substance and Sexual Activity   Drug Use No     Social History     Tobacco Use   Smoking Status Former Smoker    Packs/day: 1 00    Years: 20 00    Pack years: 20 00   Smokeless Tobacco Never Used   Tobacco Comment    quit 30 yrs ago     Family History   Problem Relation Age of Onset    Colon cancer Mother 61    Pancreatic cancer Father 62    Thyroid cancer Father 48    Coronary artery disease Paternal Grandmother     Breast cancer Cousin 54    No Known Problems Maternal Aunt     No Known Problems Sister        Meds/Allergies     (Not in a hospital admission)      Allergies   Allergen Reactions    Avelox [Moxifloxacin] Hives    Augmentin [Amoxicillin-Pot Clavulanate] Rash       Objective     not currently breastfeeding      PHYSICAL EXAM:    Gen: NAD  CV: S1 & S2 normal, RRR  CHEST: Clear to auscultate  ABD: soft, NT/ND, good bowel sounds  EXT: no edema    ASSESSMENT:     GERD, dysphagia, history of colon polyps, family history of colon cancer    PLAN:    EGD and colonoscopy

## 2022-03-23 NOTE — ANESTHESIA POSTPROCEDURE EVALUATION
Post-Op Assessment Note    CV Status:  Stable  Pain Score: 0    Pain management: adequate     Mental Status:  Arousable   Hydration Status:  Euvolemic   PONV Controlled:  Controlled   Airway Patency:  Patent      Post Op Vitals Reviewed: Yes      Staff: CRNA, Anesthesiologist         No complications documented      /58 (03/23/22 0755)    Temp (!) 97 2 °F (36 2 °C) (03/23/22 0755)    Pulse 64 (03/23/22 0755)   Resp 16 (03/23/22 0755)    SpO2 97 % (03/23/22 0755)

## 2022-03-23 NOTE — ANESTHESIA PREPROCEDURE EVALUATION
Procedure:  COLONOSCOPY  EGD    Relevant Problems   CARDIO   (+) Hyperlipidemia   (+) White coat syndrome with high blood pressure without hypertension      ENDO   (+) Hypothyroidism      GI/HEPATIC   (+) Esophageal reflux      NEURO/PSYCH   (+) Anxiety        Physical Exam    Airway    Mallampati score: III  TM Distance: >3 FB  Neck ROM: full     Dental   No notable dental hx     Cardiovascular  Cardiovascular exam normal    Pulmonary  Pulmonary exam normal     Other Findings        Anesthesia Plan  ASA Score- 2     Anesthesia Type- IV sedation with anesthesia with ASA Monitors  Additional Monitors:   Airway Plan:           Plan Factors-Exercise tolerance (METS): >4 METS  Chart reviewed  EKG reviewed  Existing labs reviewed  Patient summary reviewed  Patient is not a current smoker  Induction- intravenous  Postoperative Plan-     Informed Consent- Anesthetic plan and risks discussed with patient  I personally reviewed this patient with the CRNA  Discussed and agreed on the Anesthesia Plan with the CRNA  Patricio Garcia

## 2022-03-24 DIAGNOSIS — F41.9 ANXIETY: ICD-10-CM

## 2022-03-24 RX ORDER — LORAZEPAM 1 MG/1
1 TABLET ORAL
Qty: 30 TABLET | Refills: 1 | Status: SHIPPED | OUTPATIENT
Start: 2022-03-24 | End: 2022-06-20

## 2022-03-24 NOTE — TELEPHONE ENCOUNTER
[id number] 01/11/2022 01/11/2022 LORazepam (Tablet)  30 0 30 1 MG NA YOKO LANDIS POGODZINSKI  [facility], L [facility]  Medicare 00 / 1 [place]     1  [id number] 09/30/2021 09/30/2021 LORazepam (Tablet)  30 0 30 1 MG YOKO LUICANO POGODZINSKI  [facility], [facility]    Medicare 00 / 1 [place]

## 2022-04-20 DIAGNOSIS — J30.9 CHRONIC ALLERGIC RHINITIS: ICD-10-CM

## 2022-04-21 RX ORDER — MOMETASONE FUROATE 50 UG/1
SPRAY, METERED NASAL
Qty: 51 G | Refills: 1 | Status: SHIPPED | OUTPATIENT
Start: 2022-04-21

## 2022-06-18 DIAGNOSIS — F41.9 ANXIETY: ICD-10-CM

## 2022-06-20 RX ORDER — LORAZEPAM 1 MG/1
TABLET ORAL
Qty: 30 TABLET | Refills: 1 | Status: SHIPPED | OUTPATIENT
Start: 2022-06-20

## 2022-06-20 NOTE — TELEPHONE ENCOUNTER
1  [id number] 04/25/2022 03/24/2022 LORazepam (Tablet)  30 0 30 1 MG YOKO LUCIANO POGODZINSKI  [facility]  Medicare 01 / 1 [place]    1  [id number] 03/25/2022 03/24/2022 LORazepam (Tablet)  30 0 30 1 MG YOKO LUCIANO POGODZINSKI  [facility]  Medicare 00 / 1 [place]    2  [id number] 01/11/2022 01/11/2022 LORazepam (Tablet)  30 0 30 1 MG YOKO LUCIANO POGODZINSKI  [facility], [facility]    Medicare 00 / 1 [place]

## 2022-07-10 DIAGNOSIS — E78.2 MIXED HYPERLIPIDEMIA: ICD-10-CM

## 2022-07-11 RX ORDER — ATORVASTATIN CALCIUM 10 MG/1
TABLET, FILM COATED ORAL
Qty: 90 TABLET | Refills: 3 | Status: SHIPPED | OUTPATIENT
Start: 2022-07-11

## 2022-07-13 DIAGNOSIS — K25.3 ACUTE GASTRIC ULCER WITHOUT HEMORRHAGE OR PERFORATION: ICD-10-CM

## 2022-07-13 RX ORDER — PANTOPRAZOLE SODIUM 40 MG/1
40 TABLET, DELAYED RELEASE ORAL DAILY
Qty: 30 TABLET | Refills: 11 | Status: SHIPPED | OUTPATIENT
Start: 2022-07-13 | End: 2022-09-07

## 2022-07-13 NOTE — TELEPHONE ENCOUNTER
Patient would like script sent to [facility]) - [address]  [place] [address],[facility]  [address]   Phone:  [phone]  Fax:  [phone]  For pantoprazole

## 2022-08-16 ENCOUNTER — TELEPHONE (OUTPATIENT)
Dept: FAMILY MEDICINE CLINIC | Facility: CLINIC | Age: 77
End: 2022-08-16

## 2022-08-16 DIAGNOSIS — J01.10 ACUTE NON-RECURRENT FRONTAL SINUSITIS: Primary | ICD-10-CM

## 2022-08-16 RX ORDER — AZITHROMYCIN 250 MG/1
TABLET, FILM COATED ORAL
Qty: 6 TABLET | Refills: 0 | Status: SHIPPED | OUTPATIENT
Start: 2022-08-16 | End: 2022-08-20

## 2022-08-16 NOTE — TELEPHONE ENCOUNTER
Patient believes she has a sinus infection    She states that she has had an unbelievable amount of congestion and sinus pressure in her head and face  She said that when she dips her head she feels very lightheaded and dizzy    She denies fever/chills, sore throat, chest congestion, cough, diarrhea or nausea    She states she has been taking COVID tests throughout the week, the last one she took was this afternoon   All are negative    She is requesting that medication be sent into the pharmacy for her    [facility] [address]

## 2022-08-23 ENCOUNTER — ANNUAL EXAM (OUTPATIENT)
Dept: OBGYN CLINIC | Facility: CLINIC | Age: 77
End: 2022-08-23
Payer: COMMERCIAL

## 2022-08-23 VITALS
WEIGHT: 165.6 LBS | BODY MASS INDEX: 29.34 KG/M2 | HEIGHT: 63 IN | DIASTOLIC BLOOD PRESSURE: 88 MMHG | SYSTOLIC BLOOD PRESSURE: 130 MMHG

## 2022-08-23 DIAGNOSIS — Z01.419 ENCOUNTER FOR WELL WOMAN EXAM WITH ROUTINE GYNECOLOGICAL EXAM: Primary | ICD-10-CM

## 2022-08-23 DIAGNOSIS — Z12.31 BREAST CANCER SCREENING BY MAMMOGRAM: ICD-10-CM

## 2022-08-23 DIAGNOSIS — Z12.4 CERVICAL CANCER SCREENING: ICD-10-CM

## 2022-08-23 PROCEDURE — G0101 CA SCREEN;PELVIC/BREAST EXAM: HCPCS | Performed by: STUDENT IN AN ORGANIZED HEALTH CARE EDUCATION/TRAINING PROGRAM

## 2022-08-23 NOTE — PROGRESS NOTES
[facility]  Well Woman Annual Exam  Mark    Assessment   78 y o  postmenopausal female who is presenting for annual exam- normal well woman exam     Plan     1  Cervical cancer screening: Pap history uncomplicated- Reviewed ASCCP guidelines of disc ontinuing pap smears at age 74 in low risk patients with normal cervical cancer screening in the last 10 years  Patient did not feel comfortable at this visit to discontinue Pap smears so Pap was completed at this visit today  2  The patient declined STD testing  3  Breast cancer screening: Last Mammogram: 2021, mammogram ordered today  Reviewed ACOG recommendations of yearly mammogram for breast cancer screening   4  Last Colonoscopy completed recently per patient, awaiting pathology to see when she is due next  5  Osteoporosis screening :  Had DEXA scan completed 2021 showing osteopenia- reviewed that she should have repeat testing completed next year  We also discussed calcium and vitamin-D supplementation in addition to weight-bearing exercises  I emphasized the importance of an annual pelvic and breast exam    I have discussed the importance of monthly self-breast exams, exercise and healthy diet as well as adequate intake of calcium and vitamin D  All questions have been answered to her satisfaction  __________________________________________________________________      Subjective     78 y o  postmenopausal female  presenting for annual exam  She is without complaint  GYN  Complaints: denies  Denies genital discharge, genital ulcers, pelvic pain and vulvar/vaginal symptoms  Patient denies any vaginal bleeding since entering menopause    Sexually active: No - has not been sexually active for the past 20 years  Hx STI: denies   Hx Abnormal pap: denies  Last pap: 2018- NILM    OB         Complaints: denies  Denies urinary frequency, hematuria, urinary hesitancy, urinary retention, urinary incontinence and dysuria    BREAST  Complaints: denies  Denies: breast lump, breast tenderness, changed mole, dryness, nipple discharge, pruritus, rash, skin color change and skin lesion(s)  Last mammogram: 7/2021- [facility]  Personal hx:  Denies    Past Medical History:   Diagnosis Date    Acid reflux     Basal cell carcinoma     basal cell on back and nose    Colon polyp     Dental bridge present     upper    Disease of thyroid gland     GERD (gastroesophageal reflux disease)     Hashimoto's disease     High cholesterol     Osteopenia     Seasonal allergies     mold,pollen    Thyroid disease     thyroid nodules    Wears glasses        Past Surgical History:   Procedure Laterality Date    COLONOSCOPY      ESOPHAGOGASTRODUODENOSCOPY      MOHS RECONSTRUCTION N/A 10/4/2016    Procedure: RECONSTRUCTION MOHS DEFECT NASAL TIP WITH FLAP;  Surgeon: Marylin Yeboah MD;  Location: [facility];  Service:     MT EXC SKIN BENIG >4 CM TRUNK,ARM,LEG N/A 3/19/2019    Procedure: MID UPPER BACK LIPOMA EXCISION;  Surgeon: Marylin Yeboah MD;  [facility];  Service: Plastics    MT RECMPL WND TRUNK 2 6-7 5 CM N/A 3/19/2019    Procedure: COMPLEX CLOSURE;  Surgeon: Marylin Yeboah MD;  [facility];  Service: Plastics    SKIN BIOPSY      SKIN CANCER EXCISION      basal cell CA on back and nose-2009 and 2016    TONSILLECTOMY      UPPER GASTROINTESTINAL ENDOSCOPY           Current Outpatient Medications:     ascorbic acid (VITAMIN C) 500 mg tablet, Take 500 mg by mouth daily  , Disp: , Rfl:     atorvastatin (LIPITOR) 10 mg tablet, TAKE 1 TABLET BY MOUTH  DAILY, Disp: 90 tablet, Rfl: 3    Biotin 63717 MCG TABS, Take by mouth, Disp: , Rfl:     Calcium Carbonate-Vit D-Min (CALTRATE 600+D PLUS MINERALS PO), Take by mouth daily  , Disp: , Rfl:     levothyroxine 88 mcg tablet, TAKE 1 TABLET BY MOUTH  DAILY, Disp: 90 tablet, Rfl: 3    loratadine (CLARITIN) 10 mg tablet, Take 10 mg by mouth daily, Disp: , Rfl:   LORazepam (ATIVAN) 1 mg tablet, TAKE 1 TABLET BY MOUTH  DAILY AT BEDTIME, Disp: 30 tablet, Rfl: 1    mometasone (NASONEX) 50 mcg/act nasal spray, USE 2 SPRAYS IN BOTH  NOSTRILS DAILY, Disp: 51 g, Rfl: 1    Multiple Vitamins-Minerals (CENTRUM SILVER PO), Take by mouth daily  , Disp: , Rfl:     pantoprazole (PROTONIX) 40 mg tablet, Take 1 tablet (40 mg total) by mouth daily, Disp: 30 tablet, Rfl: 11    Sodium Fluoride 5000 Sensitive 1 1-5 % PSTE, , Disp: , Rfl:     Allergies   Allergen Reactions    Avelox [Moxifloxacin] Hives    Augmentin [Amoxicillin-Pot Clavulanate] Rash       Social History     Socioeconomic History    Marital status: /Civil Union     Spouse name: Not on file    Number of children: Not on file    Years of education: Not on file    Highest education level: Not on file   Occupational History    Not on file   Tobacco Use    Smoking status: Former Smoker     Packs/day: 1 00     Years: 20 00     Pack years: 20 00    Smokeless tobacco: Never Used    Tobacco comment: quit 30 yrs ago   Vaping Use    Vaping Use: Never used   Substance and Sexual Activity    Alcohol use: Yes     Comment: 1 glass a night     Drug use: No    Sexual activity: Not Currently   Other Topics Concern    Not on file   Social History Narrative    Daily coffee consumption; 1 cp/day    Daily cola consumption; ____ cp/day    Daily tea consumption; 1 cp/day    No domestic violence history    Uses seatbelts     Social Determinants of Health     Financial Resource Strain: Not on file   Food Insecurity: Not on file   Transportation Needs: Not on file   Physical Activity: Not on file   Stress: Not on file   Social Connections: Not on file   Intimate Partner Violence: Not on file   Housing Stability: Not on file     Review of Systems  Review of Systems   Constitutional: Negative for chills and fever  Eyes: Negative for visual disturbance     Respiratory: Negative for chest tightness, shortness of breath and wheezing  Cardiovascular: Negative for chest pain, palpitations and leg swelling  Gastrointestinal: Negative for abdominal pain, constipation, diarrhea, nausea and vomiting  Genitourinary: Negative for dysuria, flank pain, frequency, hematuria and urgency  Musculoskeletal: Negative for arthralgias and myalgias  Neurological: Negative for dizziness, weakness, light-headedness and headaches  Objective  /88 (BP Location: Left arm, Patient Position: Sitting, Cuff Size: Standard)   Ht 5' 3" (1 6 m)   Wt 75 1 kg (165 lb 9 6 oz)   Breastfeeding No   BMI 29 33 kg/m²      Physical Exam:  Physical Exam  Vitals reviewed  Constitutional:       General: She is not in acute distress  Appearance: She is well-developed  She is not diaphoretic  HENT:      Head: Normocephalic and atraumatic  Cardiovascular:      Rate and Rhythm: Normal rate and regular rhythm  Heart sounds: Normal heart sounds  No murmur heard  No friction rub  No gallop  Pulmonary:      Effort: Pulmonary effort is normal  No respiratory distress  Breath sounds: Normal breath sounds  No wheezing or rales  Abdominal:      Palpations: Abdomen is soft  Tenderness: There is no abdominal tenderness  There is no guarding or rebound  Genitourinary:     Vagina: Normal       Comments: Vulvovaginal atrophy noted, no lesions noted on the vulva or vagina, the cervix is atrophic, small with no lesions identified, no discharge or bleeding noted  On bimanual exam there is an anteverted small mobile uterus, no CMT or tenderness, no adnexal masses or fullness was appreciated  Musculoskeletal:      Cervical back: Normal range of motion and neck supple  Right lower leg: No edema  Left lower leg: No edema  Skin:     General: Skin is warm and dry  Neurological:      Mental Status: She is alert and oriented to person, place, and time     Psychiatric:         Behavior: Behavior normal        Breast inspection negative, no nipple discharge or bleeding, no masses or nodularity palpable

## 2022-08-26 LAB
CLINICAL INFO: NORMAL
DATE PREVIOUS BX: NORMAL
HPV E6+E7 MRNA CVX QL NAA+PROBE: NOT DETECTED
LMP START DATE: NORMAL
SL AMB PREV. PAP:: NORMAL
SPECIMEN SOURCE CVX/VAG CYTO: NORMAL
STAT OF ADQ CVX/VAG CYTO-IMP: NORMAL

## 2022-09-07 DIAGNOSIS — K25.3 ACUTE GASTRIC ULCER WITHOUT HEMORRHAGE OR PERFORATION: ICD-10-CM

## 2022-09-07 RX ORDER — PANTOPRAZOLE SODIUM 40 MG/1
TABLET, DELAYED RELEASE ORAL
Qty: 90 TABLET | Refills: 4 | Status: SHIPPED | OUTPATIENT
Start: 2022-09-07

## 2022-09-26 ENCOUNTER — OFFICE VISIT (OUTPATIENT)
Dept: FAMILY MEDICINE CLINIC | Facility: CLINIC | Age: 77
End: 2022-09-26
Payer: COMMERCIAL

## 2022-09-26 VITALS
SYSTOLIC BLOOD PRESSURE: 128 MMHG | HEART RATE: 70 BPM | HEIGHT: 63 IN | TEMPERATURE: 98.4 F | BODY MASS INDEX: 29.06 KG/M2 | WEIGHT: 164 LBS | DIASTOLIC BLOOD PRESSURE: 80 MMHG

## 2022-09-26 DIAGNOSIS — K21.9 GASTROESOPHAGEAL REFLUX DISEASE, UNSPECIFIED WHETHER ESOPHAGITIS PRESENT: ICD-10-CM

## 2022-09-26 DIAGNOSIS — E03.8 HYPOTHYROIDISM DUE TO HASHIMOTO'S THYROIDITIS: ICD-10-CM

## 2022-09-26 DIAGNOSIS — E83.52 HYPERCALCEMIA: ICD-10-CM

## 2022-09-26 DIAGNOSIS — E78.2 MIXED HYPERLIPIDEMIA: Primary | ICD-10-CM

## 2022-09-26 DIAGNOSIS — E06.3 HYPOTHYROIDISM DUE TO HASHIMOTO'S THYROIDITIS: ICD-10-CM

## 2022-09-26 PROCEDURE — 99214 OFFICE O/P EST MOD 30 MIN: CPT | Performed by: FAMILY MEDICINE

## 2022-09-26 PROCEDURE — 1160F RVW MEDS BY RX/DR IN RCRD: CPT | Performed by: FAMILY MEDICINE

## 2022-09-26 NOTE — PROGRESS NOTES
Name: Mauri Walton      : 1945      MRN: [id number]  Encounter Provider: Any Armstrong MD  Encounter Date: 2022   Encounter department: [address] [place] [address] Road     1  Mixed hyperlipidemia  Assessment & Plan:  Continue atorvastatin  Check labs  Adjust diet if not at goal  Recheck 6m    Orders:  -     Lipid panel; Future    2  Hypothyroidism due to Hashimoto's thyroiditis  Assessment & Plan:  Appears to be stable clinically  Check TSH  Adjust meds if TSH is not at goal  Recheck 6m      Orders:  -     TSH, 3rd generation; Future    3  Gastroesophageal reflux disease, unspecified whether esophagitis present  Assessment & Plan:  I reviewed with pt  Pt with symptoms depending on diet  Continue pantoprazole  Recheck 6m    Orders:  -     CBC and differential; Future  -     Comprehensive metabolic panel; Future    4  Hypercalcemia  Assessment & Plan:  Hold calcium supplement  Recheck labs in 2w  Further eval if still elevated  Orders:  -     Comprehensive metabolic panel; Future  -     Vitamin D 25 hydroxy; Future           Subjective      f/u multiple med issues   - pt feels well  - pt still with increased stress due to husbands health issues  - pt has not been exercising but is active at home  Pt denies CP, palp, lightheadedness or other CV symptoms with or without exertion  - no new GI issues  Had colonoscopy and EGD last Spring - patient did have a benign-appearing gastric erosion as well as 1 small polyp  Patient was started on pantoprazole by GI which seems to have helped  Still gets some reflux symptoms when she drinks white wine  - no new urinary issues  - no new extremity complaints  - previous labs reviewed  Ca++  Sl elevated  Pt has been taking Vit D and Ca++ supplements      Review of Systems   Constitutional: Negative  HENT: Negative  Eyes: Negative  Respiratory: Negative  Cardiovascular: Negative      Gastrointestinal: Negative  Endocrine: Negative  Genitourinary: Negative  Musculoskeletal: Negative  Skin: Negative  Allergic/Immunologic: Negative  Neurological: Negative  Hematological: Negative  Psychiatric/Behavioral: Negative  Current Outpatient Medications on File Prior to Visit   Medication Sig    ascorbic acid (VITAMIN C) 500 mg tablet Take 500 mg by mouth daily   atorvastatin (LIPITOR) 10 mg tablet TAKE 1 TABLET BY MOUTH  DAILY    Biotin 23026 MCG TABS Take by mouth    Calcium Carbonate-Vit D-Min (CALTRATE 600+D PLUS MINERALS PO) Take by mouth daily   levothyroxine 88 mcg tablet TAKE 1 TABLET BY MOUTH  DAILY    loratadine (CLARITIN) 10 mg tablet Take 10 mg by mouth daily    LORazepam (ATIVAN) 1 mg tablet TAKE 1 TABLET BY MOUTH  DAILY AT BEDTIME    mometasone (NASONEX) 50 mcg/act nasal spray USE 2 SPRAYS IN BOTH  NOSTRILS DAILY    Multiple Vitamins-Minerals (CENTRUM SILVER PO) Take by mouth daily   pantoprazole (PROTONIX) 40 mg tablet TAKE 1 TABLET BY MOUTH EVERY DAY    Sodium Fluoride 5000 Sensitive 1 1-5 % PSTE        Objective     /80   Pulse 70   Temp 98 4 °F (36 9 °C)   Ht 5' 3" (1 6 m)   Wt 74 4 kg (164 lb)   BMI 29 05 kg/m²     Physical Exam  Vitals reviewed  Constitutional:       Appearance: She is well-developed  She is not diaphoretic  HENT:      Head: Normocephalic and atraumatic  Right Ear: Tympanic membrane, ear canal and external ear normal       Left Ear: Tympanic membrane, ear canal and external ear normal       Mouth/Throat:      Mouth: Mucous membranes are moist    Eyes:      Extraocular Movements: Extraocular movements intact  Conjunctiva/sclera: Conjunctivae normal       Pupils: Pupils are equal, round, and reactive to light  Neck:      Thyroid: No thyromegaly  Vascular: No JVD  Cardiovascular:      Rate and Rhythm: Normal rate and regular rhythm  Pulses: Normal pulses  Heart sounds: No murmur heard    Pulmonary: Effort: Pulmonary effort is normal       Breath sounds: Normal breath sounds  Abdominal:      General: There is no distension  Palpations: Abdomen is soft  There is no mass  Tenderness: There is no abdominal tenderness  Musculoskeletal:         General: No swelling or tenderness  Normal range of motion  Cervical back: Normal range of motion and neck supple  No tenderness  No muscular tenderness  Right lower leg: No edema  Left lower leg: No edema  Lymphadenopathy:      Cervical: No cervical adenopathy  Skin:     General: Skin is warm and dry  Capillary Refill: Capillary refill takes less than 2 seconds  Neurological:      Mental Status: She is alert and oriented to person, place, and time  Cranial Nerves: No cranial nerve deficit  Sensory: No sensory deficit  Motor: No weakness or abnormal muscle tone  Gait: Gait normal       Deep Tendon Reflexes: Reflexes are normal and symmetric  Psychiatric:         Mood and Affect: Mood normal          Behavior: Behavior normal          Thought Content:  Thought content normal          Judgment: Judgment normal        Macarena Justin MD

## 2022-09-27 PROBLEM — E83.52 HYPERCALCEMIA: Status: ACTIVE | Noted: 2022-09-27

## 2022-10-01 DIAGNOSIS — J30.9 CHRONIC ALLERGIC RHINITIS: ICD-10-CM

## 2022-10-01 DIAGNOSIS — F41.9 ANXIETY: ICD-10-CM

## 2022-10-01 DIAGNOSIS — E03.9 HYPOTHYROIDISM, UNSPECIFIED TYPE: ICD-10-CM

## 2022-10-03 RX ORDER — LEVOTHYROXINE SODIUM 88 UG/1
TABLET ORAL
Qty: 90 TABLET | Refills: 3 | Status: SHIPPED | OUTPATIENT
Start: 2022-10-03

## 2022-10-03 RX ORDER — LORAZEPAM 1 MG/1
TABLET ORAL
Qty: 30 TABLET | Refills: 1 | Status: SHIPPED | OUTPATIENT
Start: 2022-10-03

## 2022-10-03 RX ORDER — MOMETASONE FUROATE 50 UG/1
SPRAY, METERED NASAL
Qty: 51 G | Refills: 1 | Status: SHIPPED | OUTPATIENT
Start: 2022-10-03

## 2022-10-03 NOTE — TELEPHONE ENCOUNTER
1  [id number] 09/05/2022 06/20/2022 LORazepam (Tablet)  30 0 30 1 MG YOKO LUCIANO POGODZINSKI  [facility]  Medicare 1 / 1 [place]    1  [id number] 06/21/2022 06/20/2022 LORazepam (Tablet)  30 0 30 1 MG YOKO LUCIANO POGODZINSKI  [facility]  Medicare 0 / 1 [place]    1  [id number] 04/25/2022 03/24/2022 LORazepam (Tablet)  30 0 30 1 MG YOKO LUCIANO POGODZINSKI  [facility]  Medicare 01 / 1 [place]

## 2022-10-05 ENCOUNTER — HOSPITAL ENCOUNTER (OUTPATIENT)
Dept: RADIOLOGY | Age: 77
Discharge: HOME | End: 2022-10-05
Payer: COMMERCIAL

## 2022-10-05 VITALS — WEIGHT: 164 LBS | HEIGHT: 63 IN | BODY MASS INDEX: 29.06 KG/M2

## 2022-10-05 DIAGNOSIS — Z12.31 BREAST CANCER SCREENING BY MAMMOGRAM: ICD-10-CM

## 2022-10-05 PROCEDURE — 77063 BREAST TOMOSYNTHESIS BI: CPT

## 2022-10-05 PROCEDURE — 77067 SCR MAMMO BI INCL CAD: CPT

## 2022-10-17 ENCOUNTER — APPOINTMENT (OUTPATIENT)
Dept: LAB | Facility: CLINIC | Age: 77
End: 2022-10-17
Payer: COMMERCIAL

## 2022-10-17 DIAGNOSIS — E03.8 HYPOTHYROIDISM DUE TO HASHIMOTO'S THYROIDITIS: ICD-10-CM

## 2022-10-17 DIAGNOSIS — E83.52 HYPERCALCEMIA: ICD-10-CM

## 2022-10-17 DIAGNOSIS — K21.9 GASTROESOPHAGEAL REFLUX DISEASE, UNSPECIFIED WHETHER ESOPHAGITIS PRESENT: ICD-10-CM

## 2022-10-17 DIAGNOSIS — E06.3 HYPOTHYROIDISM DUE TO HASHIMOTO'S THYROIDITIS: ICD-10-CM

## 2022-10-17 DIAGNOSIS — E78.2 MIXED HYPERLIPIDEMIA: ICD-10-CM

## 2022-10-17 LAB
25(OH)D3 SERPL-MCNC: 53.5 NG/ML (ref 30–100)
ALBUMIN SERPL BCP-MCNC: 4.1 G/DL (ref 3.5–5)
ALP SERPL-CCNC: 51 U/L (ref 34–104)
ALT SERPL W P-5'-P-CCNC: 14 U/L (ref 7–52)
ANION GAP SERPL CALCULATED.3IONS-SCNC: 6 MMOL/L (ref 4–13)
AST SERPL W P-5'-P-CCNC: 18 U/L (ref 13–39)
BASOPHILS # BLD AUTO: 0.02 THOUSANDS/ΜL (ref 0–0.1)
BASOPHILS NFR BLD AUTO: 1 % (ref 0–1)
BILIRUB SERPL-MCNC: 0.6 MG/DL (ref 0.2–1)
BUN SERPL-MCNC: 19 MG/DL (ref 5–25)
CALCIUM SERPL-MCNC: 9.8 MG/DL (ref 8.4–10.2)
CHLORIDE SERPL-SCNC: 107 MMOL/L (ref 96–108)
CHOLEST SERPL-MCNC: 199 MG/DL
CO2 SERPL-SCNC: 29 MMOL/L (ref 21–32)
CREAT SERPL-MCNC: 0.71 MG/DL (ref 0.6–1.3)
EOSINOPHIL # BLD AUTO: 0.08 THOUSAND/ΜL (ref 0–0.61)
EOSINOPHIL NFR BLD AUTO: 2 % (ref 0–6)
ERYTHROCYTE [DISTWIDTH] IN BLOOD BY AUTOMATED COUNT: 12.8 % (ref 11.6–15.1)
GFR SERPL CREATININE-BSD FRML MDRD: 83 ML/MIN/1.73SQ M
GLUCOSE P FAST SERPL-MCNC: 96 MG/DL (ref 65–99)
HCT VFR BLD AUTO: 41.7 % (ref 34.8–46.1)
HDLC SERPL-MCNC: 70 MG/DL
HGB BLD-MCNC: 13.4 G/DL (ref 11.5–15.4)
IMM GRANULOCYTES # BLD AUTO: 0.01 THOUSAND/UL (ref 0–0.2)
IMM GRANULOCYTES NFR BLD AUTO: 0 % (ref 0–2)
LDLC SERPL CALC-MCNC: 106 MG/DL (ref 0–100)
LYMPHOCYTES # BLD AUTO: 1.67 THOUSANDS/ΜL (ref 0.6–4.47)
LYMPHOCYTES NFR BLD AUTO: 39 % (ref 14–44)
MCH RBC QN AUTO: 29.6 PG (ref 26.8–34.3)
MCHC RBC AUTO-ENTMCNC: 32.1 G/DL (ref 31.4–37.4)
MCV RBC AUTO: 92 FL (ref 82–98)
MONOCYTES # BLD AUTO: 0.35 THOUSAND/ΜL (ref 0.17–1.22)
MONOCYTES NFR BLD AUTO: 8 % (ref 4–12)
NEUTROPHILS # BLD AUTO: 2.2 THOUSANDS/ΜL (ref 1.85–7.62)
NEUTS SEG NFR BLD AUTO: 50 % (ref 43–75)
NONHDLC SERPL-MCNC: 129 MG/DL
NRBC BLD AUTO-RTO: 0 /100 WBCS
PLATELET # BLD AUTO: 190 THOUSANDS/UL (ref 149–390)
PMV BLD AUTO: 11.9 FL (ref 8.9–12.7)
POTASSIUM SERPL-SCNC: 4.3 MMOL/L (ref 3.5–5.3)
PROT SERPL-MCNC: 6.8 G/DL (ref 6.4–8.4)
RBC # BLD AUTO: 4.53 MILLION/UL (ref 3.81–5.12)
SODIUM SERPL-SCNC: 142 MMOL/L (ref 135–147)
TRIGL SERPL-MCNC: 116 MG/DL
TSH SERPL DL<=0.05 MIU/L-ACNC: 0.6 UIU/ML (ref 0.45–4.5)
WBC # BLD AUTO: 4.33 THOUSAND/UL (ref 4.31–10.16)

## 2022-10-17 PROCEDURE — 36415 COLL VENOUS BLD VENIPUNCTURE: CPT

## 2022-10-17 PROCEDURE — 84443 ASSAY THYROID STIM HORMONE: CPT

## 2022-10-17 PROCEDURE — 80053 COMPREHEN METABOLIC PANEL: CPT

## 2022-10-17 PROCEDURE — 82306 VITAMIN D 25 HYDROXY: CPT

## 2022-10-17 PROCEDURE — 85025 COMPLETE CBC W/AUTO DIFF WBC: CPT

## 2022-10-17 PROCEDURE — 80061 LIPID PANEL: CPT

## 2022-11-28 DIAGNOSIS — F41.9 ANXIETY: ICD-10-CM

## 2022-11-28 NOTE — TELEPHONE ENCOUNTER
Patient Id Prescription #  Filled Written Drug Label Qty Days Strength MME** Prescriber Pharmacy Payment REFILL #/Auth State Detail   1  [id number] 10/04/2022  10/03/2022 LORazepam (Tablet)  30 0 30 1 MG YOKO LUCIANO POGODZINSKI [facility]  Medicare 0 / 1 [place]    1  [id number] 09/05/2022 06/20/2022 LORazepam (Tablet)  30 0 30 1 MG YOKO LUCIANO POGODZINSKI  [facility]  Medicare 1 / 1 [place]    1  [id number] 06/21/2022 06/20/2022 LORazepam (Tablet)  30 0 30 1 MG YOKO LUCIANO POGODZINSKI  [facility]  Medicare 0 / 1 [place]

## 2022-11-29 RX ORDER — LORAZEPAM 1 MG/1
TABLET ORAL
Qty: 30 TABLET | Refills: 1 | Status: SHIPPED | OUTPATIENT
Start: 2022-11-29

## 2022-12-27 ENCOUNTER — TELEPHONE (OUTPATIENT)
Dept: FAMILY MEDICINE CLINIC | Facility: CLINIC | Age: 77
End: 2022-12-27

## 2022-12-27 ENCOUNTER — TELEMEDICINE (OUTPATIENT)
Dept: FAMILY MEDICINE CLINIC | Facility: CLINIC | Age: 77
End: 2022-12-27

## 2022-12-27 DIAGNOSIS — U07.1 COVID-19: Primary | ICD-10-CM

## 2022-12-27 RX ORDER — NIRMATRELVIR AND RITONAVIR 300-100 MG
3 KIT ORAL 2 TIMES DAILY
Qty: 30 TABLET | Refills: 0 | Status: SHIPPED | OUTPATIENT
Start: 2022-12-27 | End: 2023-01-01

## 2022-12-27 NOTE — TELEPHONE ENCOUNTER
Patient tested positive for Covid today  Patient would like to know what you would like for her to do and would like to know if there is something she can take? Symptoms low grade fever, chills, head congestion, scratchy throat      Pharmacy [facility]    Pt: # [phone]

## 2022-12-27 NOTE — PROGRESS NOTES
COVID-19 Outpatient Progress Note    Assessment/Plan:    Problem List Items Addressed This Visit    None  Visit Diagnoses     COVID-19    -  Primary    Relevant Medications    nirmatrelvir & ritonavir (Paxlovid, 300/100,) tablet therapy pack         Disposition:     Isolation and masking recommendations reviewed with patient     I have spent 14 minutes directly with the patient  Greater than 50% of this time was spent in counseling/coordination of care regarding: diagnostic results, prognosis, risks and benefits of treatment options, instructions for management, patient and family education, importance of treatment compliance, risk factor reductions and impressions  Patient instructed to stop atorvastatin while taking the antiviral medication      Encounter provider: JOSE Pope     Provider located at: [address] [facility]     Recent Visits  No visits were found meeting these conditions  Showing recent visits within past 7 days and meeting all other requirements  Today's Visits  Date Type Provider Dept   12/27/22 Telemedicine Grace Charles, [address] [facility]   12/27/22 Telephone Jeannette Ru, [address] [place] [address] today's visits and meeting all other requirements  Future Appointments  No visits were found meeting these conditions  Showing future appointments within next 150 days and meeting all other requirements     This virtual check-in was done via [address] and patient was informed that this is a secure, HIPAA-compliant platform  She agrees to proceed  Patient agrees to participate in a virtual check in via telephone or video visit instead of presenting to the office to address urgent/immediate medical needs  Patient is aware this is a billable service  She acknowledged consent and understanding of privacy and security of the video platform   The patient has agreed to participate and understands they can discontinue the visit at any time  After connecting through [facility], the patient was identified by name and date of birth  Narciso Saucedo was informed that this was a telemedicine visit and that the exam was being conducted confidentially over secure lines  My office door was closed  No one else was in the room  Narciso Saucedo acknowledged consent and understanding of privacy and security of the telemedicine visit  I informed the patient that I have reviewed her record in Epic and presented the opportunity for her to ask any questions regarding the visit today  The patient agreed to participate  Verification of patient location:  Patient is located in the following state in which I hold an active license: [place]    Subjective:   Narciso Saucedo is a 78 y o  female who is concerned about COVID-19  Patient's symptoms include fatigue, nasal congestion, rhinorrhea, sore throat and headache  Patient denies fever, chills, malaise, anosmia, loss of taste, cough, shortness of breath, chest tightness, abdominal pain, nausea, vomiting, diarrhea and myalgias       - Date of symptom onset: 12/25/2022      COVID-19 vaccination status: Fully vaccinated with booster    Exposure:   Contact with a person who is under investigation (PUI) for or who is positive for COVID-19 within the last 14 days?: No    Hospitalized recently for fever and/or lower respiratory symptoms?: No      Currently a healthcare worker that is involved in direct patient care?: No      Works in a special setting where the risk of COVID-19 transmission may be high? (this may include long-term care, correctional and longterm facilities; homeless shelters; assisted-living facilities and group homes ): No      Resident in a special setting where the risk of COVID-19 transmission may be high? (this may include long-term care, correctional and longterm facilities; homeless shelters; assisted-living facilities and group homes ): No      No results found for: Macario Cabot, [address] [facility], [place], [place], [address] [facility]    Review of Systems   Constitutional: Positive for fatigue  Negative for chills and fever  HENT: Positive for congestion, postnasal drip, rhinorrhea, sinus pressure, sinus pain and sore throat  Negative for ear pain and sneezing  Respiratory: Negative for cough, chest tightness, shortness of breath and wheezing  Cardiovascular: Negative for chest pain and palpitations  Gastrointestinal: Negative for abdominal distention, abdominal pain, diarrhea, nausea and vomiting  Musculoskeletal: Negative for myalgias  Neurological: Positive for headaches  Negative for dizziness and light-headedness  Psychiatric/Behavioral: Negative  Current Outpatient Medications on File Prior to Visit   Medication Sig   • levothyroxine 88 mcg tablet TAKE 1 TABLET BY MOUTH  DAILY   • mometasone (NASONEX) 50 mcg/act nasal spray USE 2 SPRAYS IN BOTH  NOSTRILS DAILY   • ascorbic acid (VITAMIN C) 500 mg tablet Take 500 mg by mouth daily  • atorvastatin (LIPITOR) 10 mg tablet TAKE 1 TABLET BY MOUTH  DAILY   • Biotin 09456 MCG TABS Take by mouth   • Calcium Carbonate-Vit D-Min (CALTRATE 600+D PLUS MINERALS PO) Take by mouth daily  • loratadine (CLARITIN) 10 mg tablet Take 10 mg by mouth daily   • LORazepam (ATIVAN) 1 mg tablet TAKE 1 TABLET BY MOUTH  DAILY AT BEDTIME   • Multiple Vitamins-Minerals (CENTRUM SILVER PO) Take by mouth daily  • pantoprazole (PROTONIX) 40 mg tablet TAKE 1 TABLET BY MOUTH EVERY DAY   • Sodium Fluoride 5000 Sensitive 1 1-5 % PSTE      Objective: There were no vitals taken for this visit  (Vital signs not performed during visit due to limitations of telemedicine format along with patient's availability to needed equipment)    Physical Exam  Constitutional:       General: She is not in acute distress  Appearance: Normal appearance  She is well-developed and well-groomed  She is not ill-appearing     HENT:      Head: Normocephalic and atraumatic  Right Ear: External ear normal       Left Ear: External ear normal       Nose: Congestion present  Mouth/Throat:      Lips: Pink  Pharynx: Posterior oropharyngeal erythema present  Eyes:      General: Lids are normal       Extraocular Movements: Extraocular movements intact  Conjunctiva/sclera: Conjunctivae normal       Pupils: Pupils are equal, round, and reactive to light  Neck:      Trachea: Trachea and phonation normal    Cardiovascular:      Rate and Rhythm: Normal rate  Pulmonary:      Effort: Pulmonary effort is normal  No tachypnea, bradypnea, accessory muscle usage or respiratory distress  Breath sounds: No wheezing  Skin:     Coloration: Skin is not ashen, cyanotic or pale  Neurological:      General: No focal deficit present  Mental Status: She is alert and oriented to person, place, and time  Psychiatric:         Mood and Affect: Mood normal          Behavior: Behavior normal  Behavior is cooperative         [address]

## 2022-12-28 NOTE — TELEPHONE ENCOUNTER
Patient called stating that her sore throat has gotten much worse  She states that her throat actually feels like it is closing, but does not have trouble breathing, it hurts to swallow  She is asking if she can be prescribed something to soothe the sore throat       [facility] [address] [facility]

## 2023-01-20 DIAGNOSIS — F41.9 ANXIETY: ICD-10-CM

## 2023-01-23 RX ORDER — LORAZEPAM 1 MG/1
TABLET ORAL
Qty: 30 TABLET | Refills: 3 | Status: SHIPPED | OUTPATIENT
Start: 2023-01-23

## 2023-01-23 NOTE — TELEPHONE ENCOUNTER
[id number] 12/27/2022 11/29/2022 LORazepam (Tablet)  30 0 30 1 MG YOKO LUCIANO POGODZINSKI  [facility]  Medic[place] 1 / 1 [place]     1  [id number] 11/29/2022 11/29/2022 LORazepam (Tablet)  30 0 30 1 MG YOKO LUCIANO POGODZINSKI  [facility]  Medicare 0 / 1 [place]    1  [id number] 10/04/2022  10/03/2022 LORazepam (Tablet)  30 0 30 1 MG YOKO LUCIANO POGODZINSKI  [facility]  Medicare 0 / 1 [place]    1  [id number] 09/05/2022 06/20/2022 LORazepam (Tablet)  30 0 30 1 MG YOKO LUCIANO POGODZINSKI  [facility]  Medicare 1 / 1 [place]

## 2023-02-09 ENCOUNTER — TELEPHONE (OUTPATIENT)
Dept: FAMILY MEDICINE CLINIC | Facility: CLINIC | Age: 78
End: 2023-02-09

## 2023-02-09 DIAGNOSIS — J34.89 SINUS PRESSURE: ICD-10-CM

## 2023-02-09 DIAGNOSIS — J34.89 SINUS PRESSURE: Primary | ICD-10-CM

## 2023-02-09 RX ORDER — AMOXICILLIN 875 MG/1
875 TABLET, COATED ORAL 2 TIMES DAILY
Qty: 14 TABLET | Refills: 0 | Status: SHIPPED | OUTPATIENT
Start: 2023-02-09 | End: 2023-02-09

## 2023-02-09 RX ORDER — AMOXICILLIN 500 MG/1
1000 CAPSULE ORAL EVERY 12 HOURS SCHEDULED
Qty: 28 CAPSULE | Refills: 0 | Status: SHIPPED | OUTPATIENT
Start: 2023-02-09 | End: 2023-02-16

## 2023-02-09 NOTE — TELEPHONE ENCOUNTER
Patient believes she has a sinus infection     She states she feels light headed and feels a lot of pressure in the left side of her face and ear   She also said when she blows her nose the mucus is green and bloody    She denies fever, chills, sore throat, n/v/d, body aches     She mentioned that she had covid the end of December so she did not feel it was necessary to test     She is asking for medication to be sent to the pharmacy   She said Amoxicillin usually works best for her     She is aware to follow up with pharmacy if no call back     [facility]

## 2023-03-12 DIAGNOSIS — J30.9 CHRONIC ALLERGIC RHINITIS: ICD-10-CM

## 2023-03-13 RX ORDER — MOMETASONE FUROATE 50 UG/1
SPRAY, METERED NASAL
Qty: 51 G | Refills: 1 | Status: SHIPPED | OUTPATIENT
Start: 2023-03-13

## 2023-04-07 ENCOUNTER — RA CDI HCC (OUTPATIENT)
Dept: OTHER | Facility: HOSPITAL | Age: 78
End: 2023-04-07

## 2023-04-07 NOTE — PROGRESS NOTES
[facility]  coding opportunities       Chart reviewed, no opportunity found:   [address]        Patients Insurance     Medicare Insurance: [employer]

## 2023-04-14 DIAGNOSIS — E83.52 HYPERCALCEMIA: Primary | ICD-10-CM

## 2023-04-21 ENCOUNTER — TELEPHONE (OUTPATIENT)
Dept: FAMILY MEDICINE CLINIC | Facility: CLINIC | Age: 78
End: 2023-04-21

## 2023-04-21 NOTE — TELEPHONE ENCOUNTER
4/16 125/82  4/17 137/90  4/18 133/85  4/19 130/83  4/20 133/86  4/21 141/84    Patient takes this around the same time each day

## 2023-05-09 DIAGNOSIS — F41.9 ANXIETY: ICD-10-CM

## 2023-05-10 RX ORDER — LORAZEPAM 1 MG/1
TABLET ORAL
Qty: 30 TABLET | Refills: 1 | Status: SHIPPED | OUTPATIENT
Start: 2023-05-10

## 2023-05-11 ENCOUNTER — TELEPHONE (OUTPATIENT)
Dept: FAMILY MEDICINE CLINIC | Facility: CLINIC | Age: 78
End: 2023-05-11

## 2023-05-11 NOTE — TELEPHONE ENCOUNTER
Patient called with her BP reading for the past 3 weeks     4/24 142/90  4/27 129/85  4/30 124/71  5/3 128/74  5/4 142/89  5/6 139/82  5/7 120/70  5/9 125/77  5/69080/81    Patient also requests that you put an order in for a calcium test  She said next week will be 3 weeks since she started taking her calcium pill and that you wanted to recheck it

## 2023-05-12 DIAGNOSIS — E83.52 HYPERCALCEMIA: Primary | ICD-10-CM

## 2023-05-17 ENCOUNTER — APPOINTMENT (OUTPATIENT)
Dept: LAB | Facility: CLINIC | Age: 78
End: 2023-05-17

## 2023-05-17 DIAGNOSIS — E83.52 HYPERCALCEMIA: ICD-10-CM

## 2023-05-17 LAB
CA-I BLD-SCNC: 1.28 MMOL/L (ref 1.12–1.32)
CALCIUM SERPL-MCNC: 10 MG/DL (ref 8.4–10.2)

## 2023-05-19 ENCOUNTER — TELEPHONE (OUTPATIENT)
Dept: FAMILY MEDICINE CLINIC | Facility: CLINIC | Age: 78
End: 2023-05-19

## 2023-05-19 NOTE — TELEPHONE ENCOUNTER
Patient called asking if she should still take her calcium supplement even though her levels are normal now  Please advise

## 2023-06-01 ENCOUNTER — OFFICE VISIT (OUTPATIENT)
Dept: FAMILY MEDICINE CLINIC | Facility: CLINIC | Age: 78
End: 2023-06-01

## 2023-06-01 VITALS
WEIGHT: 159 LBS | OXYGEN SATURATION: 98 % | HEIGHT: 63 IN | DIASTOLIC BLOOD PRESSURE: 72 MMHG | BODY MASS INDEX: 28.17 KG/M2 | HEART RATE: 94 BPM | TEMPERATURE: 97.6 F | SYSTOLIC BLOOD PRESSURE: 110 MMHG

## 2023-06-01 DIAGNOSIS — L03.113 CELLULITIS OF RIGHT ELBOW: Primary | ICD-10-CM

## 2023-06-01 DIAGNOSIS — M70.21 OLECRANON BURSITIS OF RIGHT ELBOW: ICD-10-CM

## 2023-06-01 RX ORDER — CEPHALEXIN 500 MG/1
500 CAPSULE ORAL 3 TIMES DAILY
Qty: 21 CAPSULE | Refills: 0 | Status: SHIPPED | OUTPATIENT
Start: 2023-06-01 | End: 2023-06-09 | Stop reason: SDUPTHER

## 2023-06-01 NOTE — PROGRESS NOTES
Name: Wally Gregg      : 1945      MRN: [id number]  Encounter Provider: JOSE Moreno  Encounter Date: 2023   Encounter department: [address] [place] [address] Road     1  Cellulitis of right elbow  -     cephalexin (KEFLEX) 500 mg capsule; Take 1 capsule (500 mg total) by mouth 3 (three) times a day for 7 days    2  Olecranon bursitis of right elbow    #1 Cellulitis of right elebow  Discussed with patient plan to treat with a 7 day course of cephalexin  #2 Olecranon bursitis of right elbow  Dicussed with patient plan to treat conservatively using rest, elevation and ice application  Patient instructed to call if no improvement in 72 hours or symptoms worsen       Subjective      78 y  o female presenting with right elbow pain for the past week  She reports that the pain has progressively worsened and it is now itchy and hot to touch  She does not remember injuring or causing trauma the area  She took some ibuprofen which did help with the pain  She does report that she leans on that elbow when doing her hair every dy and was leaning on it while recently doing some yard work  Review of Systems   Constitutional: Negative  Respiratory: Negative  Cardiovascular: Negative  Gastrointestinal: Negative  Musculoskeletal: Positive for arthralgias and myalgias  Negative for joint swelling  Skin:        Increased warmth to touch and redness starting to develop along lateral aspect of forearm   Neurological: Negative  Psychiatric/Behavioral: Negative  Current Outpatient Medications on File Prior to Visit   Medication Sig   • ascorbic acid (VITAMIN C) 500 mg tablet Take 500 mg by mouth daily  • atorvastatin (LIPITOR) 10 mg tablet TAKE 1 TABLET BY MOUTH  DAILY   • Biotin 24013 MCG TABS Take by mouth   • Calcium Carbonate-Vit D-Min (CALTRATE 600+D PLUS MINERALS PO) Take by mouth daily     • levothyroxine 88 mcg tablet TAKE 1 TABLET BY MOUTH  DAILY   • "loratadine (CLARITIN) 10 mg tablet Take 10 mg by mouth daily   • LORazepam (ATIVAN) 1 mg tablet TAKE 1 TABLET BY MOUTH DAILY AT  BEDTIME   • mometasone (NASONEX) 50 mcg/act nasal spray USE 2 SPRAYS IN BOTH  NOSTRILS DAILY   • Multiple Vitamins-Minerals (CENTRUM SILVER PO) Take by mouth daily  • pantoprazole (PROTONIX) 40 mg tablet TAKE 1 TABLET BY MOUTH EVERY DAY   • Sodium Fluoride 5000 Sensitive 1 1-5 % PSTE        Objective     /72 (BP Location: Left arm, Patient Position: Sitting)   Pulse 94   Temp 97 6 °F (36 4 °C)   Ht 5' 3\" (1 6 m)   Wt 72 1 kg (159 lb)   SpO2 98%   BMI 28 17 kg/m² (Reviewed)    Physical Exam  Vitals reviewed  Constitutional:       General: She is not in acute distress  Appearance: She is well-developed and well-groomed  She is not ill-appearing  HENT:      Head: Normocephalic and atraumatic  Eyes:      General: Lids are normal       Extraocular Movements: Extraocular movements intact  Conjunctiva/sclera: Conjunctivae normal       Pupils: Pupils are equal, round, and reactive to light  Neck:      Trachea: Trachea and phonation normal    Cardiovascular:      Rate and Rhythm: Normal rate and regular rhythm  Heart sounds: Normal heart sounds  Pulmonary:      Effort: Pulmonary effort is normal       Breath sounds: Normal breath sounds  Musculoskeletal:      Right elbow: Swelling present  No deformity or effusion  Normal range of motion  Tenderness present in olecranon process  Arms:    Skin:     General: Skin is warm and dry  Capillary Refill: Capillary refill takes less than 2 seconds  Neurological:      General: No focal deficit present  Mental Status: She is alert and oriented to person, place, and time  Psychiatric:         Mood and Affect: Mood normal          Behavior: Behavior normal  Behavior is cooperative  Thought Content:  Thought content normal        JOSE Olvera  "

## 2023-06-08 ENCOUNTER — TELEPHONE (OUTPATIENT)
Dept: FAMILY MEDICINE CLINIC | Facility: CLINIC | Age: 78
End: 2023-06-08

## 2023-06-08 NOTE — TELEPHONE ENCOUNTER
Patient called stating that her elbow is still red and hot to the touch  She states it is not any better or worse  She is asking if you can extend her antibiotic  Please send to [facility] if appropriate

## 2023-06-08 NOTE — TELEPHONE ENCOUNTER
Recommend she come in for re-evaluation, infections near joints can be dangerous and we want to make sure we get her on appropriate treatment   Offer visit tomorrow any provider

## 2023-06-09 ENCOUNTER — OFFICE VISIT (OUTPATIENT)
Dept: FAMILY MEDICINE CLINIC | Facility: CLINIC | Age: 78
End: 2023-06-09
Payer: COMMERCIAL

## 2023-06-09 VITALS
HEIGHT: 63 IN | TEMPERATURE: 95 F | SYSTOLIC BLOOD PRESSURE: 112 MMHG | BODY MASS INDEX: 28.21 KG/M2 | WEIGHT: 159.2 LBS | OXYGEN SATURATION: 99 % | HEART RATE: 75 BPM | DIASTOLIC BLOOD PRESSURE: 78 MMHG

## 2023-06-09 DIAGNOSIS — L03.113 CELLULITIS OF RIGHT ELBOW: ICD-10-CM

## 2023-06-09 PROCEDURE — 99213 OFFICE O/P EST LOW 20 MIN: CPT | Performed by: FAMILY MEDICINE

## 2023-06-09 RX ORDER — CEPHALEXIN 500 MG/1
500 CAPSULE ORAL 3 TIMES DAILY
Qty: 12 CAPSULE | Refills: 0 | Status: SHIPPED | OUTPATIENT
Start: 2023-06-09 | End: 2023-06-13

## 2023-06-09 NOTE — PROGRESS NOTES
Name: Wilbert Henson      : 1945      MRN: [id number]  Encounter Provider: Zeke Velez MD  Encounter Date: 2023   Encounter department: [address] [place] [address] Road     1  Cellulitis of right elbow  -     cephalexin (KEFLEX) 500 mg capsule; Take 1 capsule (500 mg total) by mouth 3 (three) times a day for 4 days    Discussion:  I reviewed with pt  Cellulitis much better but not fully resolved  Will continue cephalexin 500mg tid x 3-4d  Radiating pain due to mild ulnar nerve irritation and should improve as the infection fully resolves  Recheck Monday or Tuesday - earlier if worse       Subjective     71 yo female here for f/u of R elbow cellulitis  Pt was seen in this office on  after 4+ day hx of worsening R elbow redness and pain  Pt was diagnosed with cellulitis and started on a 7d course of cephalexin  Pt notes that her elbow improved but has not fully resolved  She finished her antibiotic yesterday  She has sl discomfort on the medial aspect of the olecranon, with some discomfort that radiates down her arm to her 5th finger  No fever/chills or other symptoms    Review of Systems   Musculoskeletal: Positive for arthralgias (mild R elbow discomfort)  Negative for myalgias  Skin: Negative  Neurological: Positive for numbness (mild in R ulnar nerve distribution)  Negative for weakness         Past Medical History:   Diagnosis Date   • Acid reflux    • Allergic     Seasonal   • Basal cell carcinoma     basal cell on back and nose   • Cancer (HCC)     Basel cell   • Colon polyp    • Dental bridge present     upper   • Disease of thyroid gland    • GERD (gastroesophageal reflux disease)    • Hashimoto's disease    • High cholesterol    • Osteopenia    • Seasonal allergies     mold,pollen   • Thyroid disease     thyroid nodules   • Wears glasses      Past Surgical History:   Procedure Laterality Date   • COLONOSCOPY     • ESOPHAGOGASTRODUODENOSCOPY     • MOHS RECONSTRUCTION N/A 10/4/2016    Procedure: RECONSTRUCTION MOHS DEFECT NASAL TIP WITH FLAP;  Surgeon: Kathy Mancuso MD;  [facility];  Service:    • CA EXC B9 LESION MRGN XCP SK TG T/A/L >4 0 CM N/A 3/19/2019    Procedure: MID UPPER BACK LIPOMA EXCISION;  Surgeon: Kathy Mancuso MD;  [facility];  Service: Plastics   • CA REPAIR COMPLEX TRUNK 2 6-7 5 CM N/A 3/19/2019    Procedure: COMPLEX CLOSURE;  Surgeon: Kathy Mancuso MD;  [facility];  Service: Plastics   • SKIN BIOPSY     • SKIN CANCER EXCISION      basal cell CA on back and nose- and    • TONSILLECTOMY     • UPPER GASTROINTESTINAL ENDOSCOPY       Family History   Problem Relation Age of Onset   • Colon cancer Mother    • Anxiety disorder Mother         Agoraphobia   • Pancreatic cancer Father 62   • Thyroid cancer Father 48   • Cancer Father         Thyroid Pancreatic   • Coronary artery disease Paternal Grandmother    • Breast cancer Cousin 54   • No Known Problems Maternal Aunt    • No Known Problems Sister      Social History     Socioeconomic History   • Marital status: /Civil Union     Spouse name: None   • Number of children: None   • Years of education: None   • Highest education level: None   Occupational History   • None   Tobacco Use   • Smoking status: Former     Packs/day: 1 00     Years: 20 00     Total pack years: 20 00     Types: Cigarettes     Start date: 1965     Quit date: 1985     Years since quittin 2   • Smokeless tobacco: Never   • Tobacco comments:     quit 30 yrs ago   Vaping Use   • Vaping Use: Never used   Substance and Sexual Activity   • Alcohol use: Yes     Alcohol/week: 5 0 standard drinks of alcohol     Types: 5 Glasses of wine per week     Comment: 1 glass a night    • Drug use: No   • Sexual activity: Not Currently     Partners: Male     Birth control/protection: I U D     Other Topics Concern   • None   Social History Narrative    Daily coffee consumption; 1 cp/day    Daily cola consumption; ____ cp/day    Daily tea consumption; 1 cp/day    No domestic violence history    Uses seatbelts     Social Determinants of Health     Financial Resource Strain: Low Risk  (4/13/2023)    Overall Financial Resource Strain (CARDIA)    • Difficulty of Paying Living Expenses: Not hard at all   Food Insecurity: Not on file   Transportation Needs: No Transportation Needs (4/13/2023)    PRAPARE - Transportation    • Lack of Transportation (Medical): No    • Lack of Transportation (Non-Medical): No   Physical Activity: Not on file   Stress: Not on file   Social Connections: Not on file   Intimate Partner Violence: Not on file   Housing Stability: Not on file     Current Outpatient Medications on File Prior to Visit   Medication Sig   • ascorbic acid (VITAMIN C) 500 mg tablet Take 500 mg by mouth daily  • atorvastatin (LIPITOR) 10 mg tablet TAKE 1 TABLET BY MOUTH  DAILY   • Biotin 50059 MCG TABS Take by mouth   • Calcium Carbonate-Vit D-Min (CALTRATE 600+D PLUS MINERALS PO) Take by mouth daily  • levothyroxine 88 mcg tablet TAKE 1 TABLET BY MOUTH  DAILY   • loratadine (CLARITIN) 10 mg tablet Take 10 mg by mouth daily   • LORazepam (ATIVAN) 1 mg tablet TAKE 1 TABLET BY MOUTH DAILY AT  BEDTIME   • mometasone (NASONEX) 50 mcg/act nasal spray USE 2 SPRAYS IN BOTH  NOSTRILS DAILY   • Multiple Vitamins-Minerals (CENTRUM SILVER PO) Take by mouth daily     • pantoprazole (PROTONIX) 40 mg tablet TAKE 1 TABLET BY MOUTH EVERY DAY   • Sodium Fluoride 5000 Sensitive 1 1-5 % PSTE    • [DISCONTINUED] cephalexin (KEFLEX) 500 mg capsule Take 1 capsule (500 mg total) by mouth 3 (three) times a day for 7 days     Allergies   Allergen Reactions   • Avelox [Moxifloxacin] Hives   • Augmentin [Amoxicillin-Pot Clavulanate] Rash     Immunization History   Administered Date(s) Administered   • COVID-19 PFIZER VACCINE 0 3 ML IM 02/17/2021, 03/08/2021, 10/02/2021   • INFLUENZA 10/15/2022   • Influenza "Split High Dose Preservative Free IM 10/16/2012, 10/03/2013, 10/14/2014, 09/22/2015, 09/14/2016, 08/30/2017   • Influenza, high dose seasonal 0 7 mL 09/25/2018, 09/26/2019, 10/07/2020, 09/07/2021   • Influenza, seasonal, injectable 09/01/2011   • Pneumococcal Conjugate 13-Valent 12/01/2016   • Pneumococcal Polysaccharide PPV23 11/06/2013   • TD (adult) Preservative Free 01/23/2019   • Tuberculin Skin Test-PPD Intradermal 04/20/2004       Objective     /78 (BP Location: Left arm, Patient Position: Sitting)   Pulse 75   Temp (!) 95 °F (35 °C)   Ht 5' 3\" (1 6 m)   Wt 72 2 kg (159 lb 3 2 oz)   SpO2 99%   BMI 28 20 kg/m²     Physical Exam  Vitals reviewed  Musculoskeletal:         General: Tenderness (minimal, with sl warmth over the medial aspect of the R olecranon) present  No swelling or deformity  Skin:     Capillary Refill: Capillary refill takes less than 2 seconds  Findings: Erythema (mild over R medial olecranon) present  Neurological:      Mental Status: She is alert         Doe Albarado MD  "

## 2023-06-13 DIAGNOSIS — J30.9 ALLERGIC RHINITIS, UNSPECIFIED SEASONALITY, UNSPECIFIED TRIGGER: Primary | ICD-10-CM

## 2023-06-13 RX ORDER — AZELASTINE 1 MG/ML
1 SPRAY, METERED NASAL 2 TIMES DAILY
Qty: 30 ML | Refills: 0 | Status: SHIPPED | OUTPATIENT
Start: 2023-06-13

## 2023-06-13 RX ORDER — AZELASTINE 1 MG/ML
1 SPRAY, METERED NASAL 2 TIMES DAILY
COMMUNITY
End: 2023-06-13 | Stop reason: SDUPTHER

## 2023-06-16 NOTE — TELEPHONE ENCOUNTER
Pt called and states [facility] said Azelastine is not available to dispense and they are asking for a different RX to be sent in

## 2023-06-25 DIAGNOSIS — E78.2 MIXED HYPERLIPIDEMIA: ICD-10-CM

## 2023-06-26 RX ORDER — ATORVASTATIN CALCIUM 10 MG/1
TABLET, FILM COATED ORAL
Qty: 90 TABLET | Refills: 3 | Status: SHIPPED | OUTPATIENT
Start: 2023-06-26

## 2023-07-10 ENCOUNTER — TELEPHONE (OUTPATIENT)
Dept: FAMILY MEDICINE CLINIC | Facility: CLINIC | Age: 78
End: 2023-07-10

## 2023-07-10 DIAGNOSIS — J01.90 ACUTE NON-RECURRENT SINUSITIS, UNSPECIFIED LOCATION: Primary | ICD-10-CM

## 2023-07-10 RX ORDER — AZITHROMYCIN 250 MG/1
TABLET, FILM COATED ORAL
Qty: 6 TABLET | Refills: 0 | Status: SHIPPED | OUTPATIENT
Start: 2023-07-10 | End: 2023-07-14

## 2023-07-10 NOTE — TELEPHONE ENCOUNTER
Patient left a message on outlook, Please Advise    Spoke with patient, everything is happening on the left side since wednesday. Having a dull headache and left ear pain, no fever, no nasal discharge, no redness or swelling in the cheeks, forehead or eyelids. Good morning. This is Bulmaro Steve birthday [birth year]. I am convinced that I have a sinus infection or an ear infection. I've been dealing with this since Wednesday, taking Advil Sinus, thinking that it's related to the weather, but it just seems to be getting worse, especially the left side of my face and my left ear. So if I could get an antibiotic, that would be wonderful. If I need to be seen, I can do that too. But please get back to me when you can. I know Monday must be a nutty day. Thank you. My phone number, [phone]. Bye.  Bye

## 2023-07-14 NOTE — TELEPHONE ENCOUNTER
Patient left a message via Pressi with an update,     Hi, this is Dulce Cage calling on Doctor Malia. Wanted me to get back to him about how I'm feeling after taking the Z Pack. I'm taking my last pill today and the symptoms have eased up. Still there some of them. So we'll see what the weekend brings and if necessary, I'll be in touch on Monday. So I'm just leaving this message today. I hope he gets it. Have a good weekend. Thank you.  Carol

## 2023-07-24 DIAGNOSIS — F41.9 ANXIETY: ICD-10-CM

## 2023-07-24 DIAGNOSIS — J30.9 CHRONIC ALLERGIC RHINITIS: ICD-10-CM

## 2023-07-25 RX ORDER — MOMETASONE FUROATE 50 UG/1
SPRAY, METERED NASAL
Qty: 51 G | Refills: 1 | Status: SHIPPED | OUTPATIENT
Start: 2023-07-25

## 2023-07-25 RX ORDER — LORAZEPAM 1 MG/1
TABLET ORAL
Qty: 30 TABLET | Refills: 0 | Status: SHIPPED | OUTPATIENT
Start: 2023-07-25

## 2023-07-25 NOTE — TELEPHONE ENCOUNTER
1 [id number] 06/12/2023 05/10/2023 LORazepam (Tablet) 30.0 30 1 MG YOKO LUCIANO POGODZINSKI [facility] Medicare 0 / 1 PA    1 [id number] 05/15/2023 05/10/2023 LORazepam (Tablet) 30.0 30 1 MG YOKO LUCIANO POGODZINSKI [facility] Medicare 0 / 1 PA    1 [id number] 04/21/2023 01/23/2023 LORazepam (Tablet) 30.0 30 1 MG YOKO LUCIANO POGODZINSKI [facility] Medicare 3 / 3 PA

## 2023-09-12 ENCOUNTER — TELEPHONE (OUTPATIENT)
Dept: OBGYN CLINIC | Facility: CLINIC | Age: 78
End: 2023-09-12

## 2023-09-12 DIAGNOSIS — Z12.31 BREAST CANCER SCREENING BY MAMMOGRAM: Primary | ICD-10-CM

## 2023-09-21 ENCOUNTER — IMMUNIZATIONS (OUTPATIENT)
Dept: FAMILY MEDICINE CLINIC | Facility: CLINIC | Age: 78
End: 2023-09-21
Payer: COMMERCIAL

## 2023-09-21 DIAGNOSIS — Z23 IMMUNIZATION DUE: Primary | ICD-10-CM

## 2023-09-21 DIAGNOSIS — Z23 ENCOUNTER FOR IMMUNIZATION: Primary | ICD-10-CM

## 2023-09-21 PROCEDURE — 90662 IIV NO PRSV INCREASED AG IM: CPT

## 2023-09-21 PROCEDURE — G0008 ADMIN INFLUENZA VIRUS VAC: HCPCS

## 2023-10-10 DIAGNOSIS — F41.9 ANXIETY: ICD-10-CM

## 2023-10-10 DIAGNOSIS — K25.3 ACUTE GASTRIC ULCER WITHOUT HEMORRHAGE OR PERFORATION: ICD-10-CM

## 2023-10-11 RX ORDER — PANTOPRAZOLE SODIUM 40 MG/1
40 TABLET, DELAYED RELEASE ORAL DAILY
Qty: 90 TABLET | Refills: 0 | Status: SHIPPED | OUTPATIENT
Start: 2023-10-11

## 2023-10-11 RX ORDER — LORAZEPAM 1 MG/1
TABLET ORAL
Qty: 30 TABLET | Refills: 1 | Status: SHIPPED | OUTPATIENT
Start: 2023-10-11

## 2023-10-18 ENCOUNTER — VBI (OUTPATIENT)
Dept: ADMINISTRATIVE | Facility: OTHER | Age: 78
End: 2023-10-18

## 2023-10-18 NOTE — TELEPHONE ENCOUNTER
10/18/23 1:48 PM    Patient contacted (left message) to bring Advance Directive, POLST, or Living Will document to next scheduled pcp visit. Thank you.   Keysha Maria  [facility]

## 2023-10-20 ENCOUNTER — APPOINTMENT (OUTPATIENT)
Dept: LAB | Facility: CLINIC | Age: 78
End: 2023-10-20
Payer: COMMERCIAL

## 2023-10-20 ENCOUNTER — OFFICE VISIT (OUTPATIENT)
Dept: FAMILY MEDICINE CLINIC | Facility: CLINIC | Age: 78
End: 2023-10-20
Payer: COMMERCIAL

## 2023-10-20 VITALS
BODY MASS INDEX: 28 KG/M2 | SYSTOLIC BLOOD PRESSURE: 142 MMHG | HEART RATE: 106 BPM | HEIGHT: 63 IN | TEMPERATURE: 98 F | WEIGHT: 158 LBS | OXYGEN SATURATION: 98 % | DIASTOLIC BLOOD PRESSURE: 92 MMHG

## 2023-10-20 DIAGNOSIS — E78.2 MIXED HYPERLIPIDEMIA: ICD-10-CM

## 2023-10-20 DIAGNOSIS — E03.9 HYPOTHYROIDISM, UNSPECIFIED TYPE: ICD-10-CM

## 2023-10-20 DIAGNOSIS — I49.9 IRREGULAR CARDIAC RHYTHM: ICD-10-CM

## 2023-10-20 DIAGNOSIS — I49.9 IRREGULAR CARDIAC RHYTHM: Primary | ICD-10-CM

## 2023-10-20 LAB
ALBUMIN SERPL BCP-MCNC: 4.2 G/DL (ref 3.5–5)
ALP SERPL-CCNC: 49 U/L (ref 34–104)
ALT SERPL W P-5'-P-CCNC: 14 U/L (ref 7–52)
ANION GAP SERPL CALCULATED.3IONS-SCNC: 6 MMOL/L
AST SERPL W P-5'-P-CCNC: 19 U/L (ref 13–39)
BASOPHILS # BLD AUTO: 0.02 THOUSANDS/ÂΜL (ref 0–0.1)
BASOPHILS NFR BLD AUTO: 1 % (ref 0–1)
BILIRUB SERPL-MCNC: 0.73 MG/DL (ref 0.2–1)
BUN SERPL-MCNC: 20 MG/DL (ref 5–25)
CALCIUM SERPL-MCNC: 10.7 MG/DL (ref 8.4–10.2)
CHLORIDE SERPL-SCNC: 105 MMOL/L (ref 96–108)
CHOLEST SERPL-MCNC: 192 MG/DL
CO2 SERPL-SCNC: 29 MMOL/L (ref 21–32)
CREAT SERPL-MCNC: 0.77 MG/DL (ref 0.6–1.3)
EOSINOPHIL # BLD AUTO: 0.09 THOUSAND/ÂΜL (ref 0–0.61)
EOSINOPHIL NFR BLD AUTO: 2 % (ref 0–6)
ERYTHROCYTE [DISTWIDTH] IN BLOOD BY AUTOMATED COUNT: 12.7 % (ref 11.6–15.1)
GFR SERPL CREATININE-BSD FRML MDRD: 75 ML/MIN/1.73SQ M
GLUCOSE P FAST SERPL-MCNC: 103 MG/DL (ref 65–99)
HCT VFR BLD AUTO: 42.8 % (ref 34.8–46.1)
HDLC SERPL-MCNC: 78 MG/DL
HGB BLD-MCNC: 13.8 G/DL (ref 11.5–15.4)
IMM GRANULOCYTES # BLD AUTO: 0.01 THOUSAND/UL (ref 0–0.2)
IMM GRANULOCYTES NFR BLD AUTO: 0 % (ref 0–2)
LDLC SERPL CALC-MCNC: 95 MG/DL (ref 0–100)
LYMPHOCYTES # BLD AUTO: 1.26 THOUSANDS/ÂΜL (ref 0.6–4.47)
LYMPHOCYTES NFR BLD AUTO: 31 % (ref 14–44)
MAGNESIUM SERPL-MCNC: 2 MG/DL (ref 1.9–2.7)
MCH RBC QN AUTO: 30.4 PG (ref 26.8–34.3)
MCHC RBC AUTO-ENTMCNC: 32.2 G/DL (ref 31.4–37.4)
MCV RBC AUTO: 94 FL (ref 82–98)
MONOCYTES # BLD AUTO: 0.32 THOUSAND/ÂΜL (ref 0.17–1.22)
MONOCYTES NFR BLD AUTO: 8 % (ref 4–12)
NEUTROPHILS # BLD AUTO: 2.41 THOUSANDS/ÂΜL (ref 1.85–7.62)
NEUTS SEG NFR BLD AUTO: 58 % (ref 43–75)
NONHDLC SERPL-MCNC: 114 MG/DL
NRBC BLD AUTO-RTO: 0 /100 WBCS
PLATELET # BLD AUTO: 182 THOUSANDS/UL (ref 149–390)
PMV BLD AUTO: 12.4 FL (ref 8.9–12.7)
POTASSIUM SERPL-SCNC: 4.1 MMOL/L (ref 3.5–5.3)
PROT SERPL-MCNC: 6.9 G/DL (ref 6.4–8.4)
RBC # BLD AUTO: 4.54 MILLION/UL (ref 3.81–5.12)
SODIUM SERPL-SCNC: 140 MMOL/L (ref 135–147)
TRIGL SERPL-MCNC: 97 MG/DL
TSH SERPL DL<=0.05 MIU/L-ACNC: 0.52 UIU/ML (ref 0.45–4.5)
WBC # BLD AUTO: 4.11 THOUSAND/UL (ref 4.31–10.16)

## 2023-10-20 PROCEDURE — 85025 COMPLETE CBC W/AUTO DIFF WBC: CPT

## 2023-10-20 PROCEDURE — 80053 COMPREHEN METABOLIC PANEL: CPT

## 2023-10-20 PROCEDURE — 84443 ASSAY THYROID STIM HORMONE: CPT

## 2023-10-20 PROCEDURE — 80061 LIPID PANEL: CPT

## 2023-10-20 PROCEDURE — 36415 COLL VENOUS BLD VENIPUNCTURE: CPT

## 2023-10-20 PROCEDURE — 83735 ASSAY OF MAGNESIUM: CPT

## 2023-10-20 PROCEDURE — 99214 OFFICE O/P EST MOD 30 MIN: CPT | Performed by: FAMILY MEDICINE

## 2023-10-20 PROCEDURE — 93000 ELECTROCARDIOGRAM COMPLETE: CPT | Performed by: FAMILY MEDICINE

## 2023-10-20 NOTE — PROGRESS NOTES
Name: Eric Amaya      : 1945      MRN: [id number]  Encounter Provider: Hetal Alanis MD  Encounter Date: 10/20/2023   Encounter department: [address] [place] [address]     1. Irregular cardiac rhythm  Assessment & Plan:  Frequent unifocal PVCs. Pt asymptomatic. Check labs. Avoid stimulants. Recheck 3-6m - earlier if patient becomes symptomatic    Orders:  -     POCT ECG  -     CBC and differential; Future  -     Comprehensive metabolic panel; Future  -     Magnesium; Future  -     TSH, 3rd generation with Free T4 reflex; Future    2. Hypothyroidism, unspecified type  Assessment & Plan:  Appears to be stable clinically. Check TSH. Adjust meds if TSH is not at goal. Recheck 6m      Orders:  -     TSH, 3rd generation with Free T4 reflex; Future    3. Mixed hyperlipidemia  Assessment & Plan:  Monitor labs. Adjust atorvastatin dose if not at goal. Recheck 6m    Orders:  -     Comprehensive metabolic panel; Future  -     Lipid panel; Future         Subjective     f/u multiple med issues   - pt feels well. - pt still under high stress secondary to multiple medical issues. Mood stable. 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 Rare radiation to R leg  - has hearing eval last week. To get hearing aids  - pt has not been walking due to schedule. Pt denies CP, palp, lightheadedness or other CV symptoms with or without exertion. - pt denies any new GI or  complaints. Pt to see Gi in a couple of weeks. To have mammo in january  - no new extremity complaints            Review of Systems   Constitutional: Negative. HENT:  Positive for hearing loss. Negative for congestion, ear discharge, ear pain, sinus pressure and sinus pain. Eyes: Negative. Respiratory: Negative. Cardiovascular: Negative. Gastrointestinal: Negative. Genitourinary: Negative. Musculoskeletal:  Positive for arthralgias, back pain and myalgias. Negative for gait problem and joint swelling. Skin: Negative. Neurological: Negative. Psychiatric/Behavioral: Negative.          Past Medical History:   Diagnosis Date   • Acid reflux    • Allergic 2001    Seasonal   • Basal cell carcinoma     basal cell on back and nose   • Cancer (HCC)     Basel cell   • Colon polyp    • Dental bridge present     upper   • Disease of thyroid gland    • GERD (gastroesophageal reflux disease)    • Hashimoto's disease    • High cholesterol    • Osteopenia    • Seasonal allergies     mold,pollen   • Thyroid disease     thyroid nodules   • Wears glasses      Past Surgical History:   Procedure Laterality Date   • COLONOSCOPY     • ESOPHAGOGASTRODUODENOSCOPY     • MOHS RECONSTRUCTION N/A 10/4/2016    Procedure: RECONSTRUCTION MOHS DEFECT NASAL TIP WITH FLAP;  Surgeon: Rickie Rocha MD;  [facility];  Service:    • WI EXC B9 LESION MRGN XCP SK TG T/A/L >4.0 CM N/A 3/19/2019    Procedure: MID UPPER BACK LIPOMA EXCISION;  Surgeon: Rickie Rocha MD;  [facility];  Service: Plastics   • WI REPAIR COMPLEX TRUNK 2.6-7.5 CM N/A 3/19/2019    Procedure: COMPLEX CLOSURE;  Surgeon: Rickie Rocha MD;  [facility];  Service: Plastics   • SKIN BIOPSY     • SKIN CANCER EXCISION      basal cell CA on back and nose-2009 and 2016   • TONSILLECTOMY     • UPPER GASTROINTESTINAL ENDOSCOPY       Family History   Problem Relation Age of Onset   • Colon cancer Mother    • Anxiety disorder Mother         Agoraphobia   • Pancreatic cancer Father 62   • Thyroid cancer Father 48   • Cancer Father         Thyroid Pancreatic   • Coronary artery disease Paternal Grandmother    • Breast cancer Cousin 54   • No Known Problems Maternal Aunt    • No Known Problems Sister      Social History     Socioeconomic History   • Marital status: /Civil Union     Spouse name: None   • Number of children: None   • Years of education: None   • Highest education level: None   Occupational History   • None   Tobacco Use   • Smoking status: Former     Packs/day: 1.00     Years: 20.00     Total pack years: 20.00     Types: Cigarettes     Start date: 1965     Quit date: 1985     Years since quittin.5   • Smokeless tobacco: Never   • Tobacco comments:     quit 30 yrs ago   Vaping Use   • Vaping Use: Never used   Substance and Sexual Activity   • Alcohol use: Yes     Alcohol/week: 5.0 standard drinks of alcohol     Types: 5 Glasses of wine per week     Comment: 1 glass a night    • Drug use: No   • Sexual activity: Not Currently     Partners: Male     Birth control/protection: I.U.D. Other Topics Concern   • None   Social History Narrative    Daily coffee consumption; 1 cp/day    Daily cola consumption; ____ cp/day    Daily tea consumption; 1 cp/day    No domestic violence history    Uses seatbelts     Social Determinants of Health     Financial Resource Strain: Low Risk  (2023)    Overall Financial Resource Strain (CARDIA)    • Difficulty of Paying Living Expenses: Not hard at all   Food Insecurity: Not on file   Transportation Needs: No Transportation Needs (2023)    PRAPARE - Transportation    • Lack of Transportation (Medical): No    • Lack of Transportation (Non-Medical): No   Physical Activity: Not on file   Stress: Not on file   Social Connections: Not on file   Intimate Partner Violence: Not on file   Housing Stability: Not on file     Current Outpatient Medications on File Prior to Visit   Medication Sig   • ascorbic acid (VITAMIN C) 500 mg tablet Take 500 mg by mouth daily. • atorvastatin (LIPITOR) 10 mg tablet TAKE 1 TABLET BY MOUTH  DAILY   • Biotin 63750 MCG TABS Take by mouth   • Calcium Carbonate-Vit D-Min (CALTRATE 600+D PLUS MINERALS PO) Take by mouth daily.    • levothyroxine 88 mcg tablet TAKE 1 TABLET BY MOUTH  DAILY   • loratadine (CLARITIN) 10 mg tablet Take 10 mg by mouth daily   • LORazepam (ATIVAN) 1 mg tablet TAKE 1 TABLET BY MOUTH DAILY AT  BEDTIME   • mometasone (NASONEX) 50 mcg/act nasal spray USE 2 SPRAYS IN BOTH NOSTRILS  DAILY   • Multiple Vitamins-Minerals (CENTRUM SILVER PO) Take by mouth daily. • pantoprazole (PROTONIX) 40 mg tablet TAKE 1 TABLET BY MOUTH  DAILY   • Sodium Fluoride 5000 Sensitive 1.1-5 % PSTE      Allergies   Allergen Reactions   • Avelox [Moxifloxacin] Hives   • Augmentin [Amoxicillin-Pot Clavulanate] Rash     Immunization History   Administered Date(s) Administered   • COVID-19 PFIZER VACCINE 0.3 ML IM 02/17/2021, 03/08/2021, 10/02/2021   • INFLUENZA 10/15/2022   • Influenza Split High Dose Preservative Free IM 10/16/2012, 10/03/2013, 10/14/2014, 09/22/2015, 09/14/2016, 08/30/2017   • Influenza, high dose seasonal 0.7 mL 09/25/2018, 09/26/2019, 10/07/2020, 09/07/2021, 09/21/2023   • Influenza, seasonal, injectable 09/01/2011   • Pneumococcal Conjugate 13-Valent 12/01/2016   • Pneumococcal Polysaccharide PPV23 11/06/2013   • TD (adult) Preservative Free 01/23/2019   • Tuberculin Skin Test-PPD Intradermal 04/20/2004       Objective     /92   Pulse (!) 106   Temp 98 °F (36.7 °C)   Ht 5' 3" (1.6 m)   Wt 71.7 kg (158 lb)   SpO2 98%   BMI 27.99 kg/m²     Physical Exam  Vitals reviewed. HENT:      Head: Normocephalic. Right Ear: Tympanic membrane, ear canal and external ear normal.      Left Ear: Tympanic membrane, ear canal and external ear normal.      Nose: Nose normal.   Eyes:      Extraocular Movements: Extraocular movements intact. Conjunctiva/sclera: Conjunctivae normal.      Pupils: Pupils are equal, round, and reactive to light. Cardiovascular:      Rate and Rhythm: Normal rate. Rhythm irregular. Pulses: Normal pulses. Pulmonary:      Effort: Pulmonary effort is normal.      Breath sounds: No rales. Abdominal:      General: There is no distension. Palpations: There is no mass. Tenderness: There is no abdominal tenderness. Musculoskeletal:         General: Tenderness (mild over lower paralumbar spine, R>L) present. No deformity. Cervical back: No tenderness. Right lower leg: No edema. Left lower leg: No edema. Lymphadenopathy:      Cervical: No cervical adenopathy. Skin:     General: Skin is warm. Capillary Refill: Capillary refill takes less than 2 seconds. Neurological:      General: No focal deficit present. Mental Status: She is alert and oriented to person, place, and time.    Psychiatric:         Mood and Affect: Mood normal.       Ric Lake MD

## 2023-10-24 PROBLEM — I49.9 IRREGULAR CARDIAC RHYTHM: Status: ACTIVE | Noted: 2023-10-24

## 2023-10-25 NOTE — ASSESSMENT & PLAN NOTE
Frequent unifocal PVCs. Pt asymptomatic. Check labs. Avoid stimulants.  Recheck 3-6m - earlier if patient becomes symptomatic

## 2023-11-19 ENCOUNTER — OFFICE VISIT (OUTPATIENT)
Dept: URGENT CARE | Age: 78
End: 2023-11-19
Payer: COMMERCIAL

## 2023-11-19 VITALS
RESPIRATION RATE: 18 BRPM | WEIGHT: 158 LBS | OXYGEN SATURATION: 98 % | HEIGHT: 62 IN | BODY MASS INDEX: 29.08 KG/M2 | HEART RATE: 51 BPM | TEMPERATURE: 98.3 F

## 2023-11-19 DIAGNOSIS — J06.9 ACUTE URI: Primary | ICD-10-CM

## 2023-11-19 PROCEDURE — 99213 OFFICE O/P EST LOW 20 MIN: CPT | Performed by: PHYSICIAN ASSISTANT

## 2023-11-19 PROCEDURE — S9083 URGENT CARE CENTER GLOBAL: HCPCS | Performed by: PHYSICIAN ASSISTANT

## 2023-11-19 NOTE — PATIENT INSTRUCTIONS
Cold Symptoms   AMBULATORY CARE:   Cold symptoms  include sneezing, dry throat, a stuffy nose, headache, watery eyes, and a cough. Your cough may be dry, or you may cough up mucus. You may also have muscle aches, joint pain, and tiredness. Rarely, you may have a fever. Cold symptoms occur from inflammation in your upper respiratory system caused by a virus. Most colds go away without treatment. Seek care immediately if:   You have increased tiredness and weakness. You are unable to eat. Your heart is beating much faster than usual for you. You see white spots in the back of your throat and your neck is swollen and sore to the touch. You see pinpoint or larger reddish-purple dots on your skin. Contact your healthcare provider if:   You have a fever higher than 102°F (38.9°C). You have new or worsening shortness of breath. You have thick nasal drainage for more than 2 days. Your symptoms do not improve or get worse within 5 days. You have questions or concerns about your condition or care. Treatment for cold symptoms  may include NSAIDS to decrease muscle aches and fever. Cold medicines may also be given to decrease coughing, nasal stuffiness, sneezing, and a runny nose. Manage your cold symptoms: The following may help relieve cold symptoms, such as a dry throat and congestion:  Gargle with mouthwash or warm salt water as directed. Suck on throat lozenges or hard candy. Use a cold or warm vaporizer or humidifier to ease your breathing. Rest for at least 2 days and then as needed to decrease tiredness and weakness. Use petroleum based jelly around your nostrils to decrease irritation from blowing your nose. Drink plenty of liquids. Liquids will help thin and loosen thick mucus so you can cough it up. Liquids will also keep you hydrated. Ask your healthcare provider which liquids are best for you and how much to drink each day.     Prevent the spread of germs by washing your hands often. You can spread your cold germs to others for at least 3 days after your symptoms start. Do not share items, such as eating utensils. Cover your nose and mouth when you cough or sneeze using the crook of your elbow instead of your hands. Throw used tissues in the garbage. Do not smoke:  Smoking may worsen your symptoms and increase the length of time you feel sick. Talk with your healthcare provider if you need help to stop smoking. Follow up with your doctor as directed:  Write down your questions so you remember to ask them during your visits. © Copyright Loletta Gosselin 2023 Information is for End User's use only and may not be sold, redistributed or otherwise used for commercial purposes. The above information is an  only. It is not intended as medical advice for individual conditions or treatments. Talk to your doctor, nurse or pharmacist before following any medical regimen to see if it is safe and effective for you.

## 2023-11-19 NOTE — PROGRESS NOTES
[facility]        NAME: Aleida Taylor is a 70 y.o. female  : 1945    MRN: [id number]  DATE: 2023  TIME: 1:41 PM    Assessment and Plan   Acute URI [J06.9]  1. Acute URI              Patient Instructions       Follow up with PCP in 3-5 days. Proceed to  ER if symptoms worsen. Chief Complaint     Chief Complaint   Patient presents with    Sinus Problem     Started Friday with sinus pain and pressure. . felling pain in jaw ans ears with touch. ..  feels foggy  Covid test negative         History of Present Illness       URI   This is a new problem. The current episode started yesterday (x 3 days). There has been no fever. Associated symptoms include congestion, rhinorrhea and sinus pain. Pertinent negatives include no abdominal pain, chest pain, coughing, diarrhea, dysuria, ear pain, headaches, joint swelling, nausea, rash, sore throat, swollen glands, vomiting or wheezing. She has tried antihistamine for the symptoms. The treatment provided mild relief. Review of Systems   Review of Systems   Constitutional:  Negative for chills, fatigue and fever. HENT:  Positive for congestion, rhinorrhea and sinus pain. Negative for ear pain, hearing loss, postnasal drip, sinus pressure and sore throat. Eyes:  Negative for pain and discharge. Respiratory:  Negative for cough, chest tightness, shortness of breath and wheezing. Cardiovascular:  Negative for chest pain. Gastrointestinal:  Negative for abdominal pain, constipation, diarrhea, nausea and vomiting. Genitourinary:  Negative for difficulty urinating and dysuria. Musculoskeletal:  Negative for arthralgias and myalgias. Skin:  Negative for rash. Neurological:  Negative for dizziness and headaches. Psychiatric/Behavioral:  Negative for behavioral problems. Current Medications       Current Outpatient Medications:     ascorbic acid (VITAMIN C) 500 mg tablet, Take 500 mg by mouth daily. , Disp: , Rfl: atorvastatin (LIPITOR) 10 mg tablet, TAKE 1 TABLET BY MOUTH  DAILY, Disp: 90 tablet, Rfl: 3    Biotin 15071 MCG TABS, Take by mouth, Disp: , Rfl:     Calcium Carbonate-Vit D-Min (CALTRATE 600+D PLUS MINERALS PO), Take by mouth daily. , Disp: , Rfl:     levothyroxine 88 mcg tablet, TAKE 1 TABLET BY MOUTH  DAILY, Disp: 90 tablet, Rfl: 1    loratadine (CLARITIN) 10 mg tablet, Take 10 mg by mouth daily, Disp: , Rfl:     LORazepam (ATIVAN) 1 mg tablet, TAKE 1 TABLET BY MOUTH DAILY AT  BEDTIME, Disp: 30 tablet, Rfl: 1    mometasone (NASONEX) 50 mcg/act nasal spray, USE 2 SPRAYS IN BOTH NOSTRILS  DAILY, Disp: 51 g, Rfl: 1    Multiple Vitamins-Minerals (CENTRUM SILVER PO), Take by mouth daily. , Disp: , Rfl:     pantoprazole (PROTONIX) 40 mg tablet, TAKE 1 TABLET BY MOUTH  DAILY, Disp: 90 tablet, Rfl: 0    Sodium Fluoride 5000 Sensitive 1.1-5 % PSTE, , Disp: , Rfl:     Current Allergies     Allergies as of 11/19/2023 - Reviewed 11/19/2023   Allergen Reaction Noted    Avelox [moxifloxacin] Hives 09/19/2016    Augmentin [amoxicillin-pot clavulanate] Rash 04/09/2018            The following portions of the patient's history were reviewed and updated as appropriate: allergies, current medications, past family history, past medical history, past social history, past surgical history and problem list.     Past Medical History:   Diagnosis Date    Acid reflux     Allergic 2001    Seasonal    Basal cell carcinoma     basal cell on back and nose    Cancer ([address])     Basel cell    Colon polyp     Dental bridge present     upper    Disease of thyroid gland     GERD (gastroesophageal reflux disease)     Hashimoto's disease     High cholesterol     Osteopenia     Seasonal allergies     mold,pollen    Thyroid disease     thyroid nodules    Wears glasses        Past Surgical History:   Procedure Laterality Date    COLONOSCOPY      ESOPHAGOGASTRODUODENOSCOPY      MOHS RECONSTRUCTION N/A 10/4/2016    Procedure: RECONSTRUCTION MOHS DEFECT NASAL TIP WITH FLAP;  Surgeon: Rush Sharpe MD;  Location: [facility];  Service:     WV EXC B9 LESION MRGN XCP SK TG T/A/L >4.0 CM N/A 3/19/2019    Procedure: MID UPPER BACK LIPOMA EXCISION;  Surgeon: Rush Sharpe MD;  Location: [facility];  Service: Plastics    WV REPAIR COMPLEX TRUNK 2.6-7.5 CM N/A 3/19/2019    Procedure: COMPLEX CLOSURE;  Surgeon: Rush Sharpe MD;  [facility];  Service: Plastics    SKIN BIOPSY      SKIN CANCER EXCISION      basal cell CA on back and nose-2009 and 2016    TONSILLECTOMY      UPPER GASTROINTESTINAL ENDOSCOPY         Family History   Problem Relation Age of Onset    Colon cancer Mother     Anxiety disorder Mother         Agoraphobia    Pancreatic cancer Father 62    Thyroid cancer Father 48    Cancer Father         Thyroid Pancreatic    Coronary artery disease Paternal Grandmother     Breast cancer Cousin 54    No Known Problems Maternal Aunt     No Known Problems Sister          Medications have been verified. Objective   Pulse (!) 51   Temp 98.3 °F (36.8 °C)   Resp 18   Ht 5' 1.5" (1.562 m)   Wt 71.7 kg (158 lb)   SpO2 98%   BMI 29.37 kg/m²   No LMP recorded. Patient is postmenopausal.       Physical Exam     Physical Exam  Vitals and nursing note reviewed. Constitutional:       Appearance: She is well-developed. HENT:      Right Ear: Tympanic membrane and external ear normal.      Left Ear: Tympanic membrane and external ear normal.      Nose: Congestion and rhinorrhea present. Mouth/Throat:      Mouth: Mucous membranes are moist.   Cardiovascular:      Rate and Rhythm: Normal rate and regular rhythm. Heart sounds: Normal heart sounds, S1 normal and S2 normal. No murmur heard. Pulmonary:      Effort: Pulmonary effort is normal. No respiratory distress. Breath sounds: Normal breath sounds. No wheezing or rales. Chest:      Chest wall: No tenderness. Musculoskeletal:      Cervical back: Normal range of motion.  No edema.   Lymphadenopathy:      Cervical: No cervical adenopathy. Skin:     General: Skin is warm and dry. Findings: No rash. Neurological:      Mental Status: She is alert and oriented to person, place, and time.    Psychiatric:         Speech: Speech normal.         Behavior: Behavior normal.

## 2023-11-30 ENCOUNTER — OFFICE VISIT (OUTPATIENT)
Dept: GASTROENTEROLOGY | Facility: AMBULARY SURGERY CENTER | Age: 78
End: 2023-11-30
Payer: COMMERCIAL

## 2023-11-30 VITALS
HEIGHT: 61 IN | BODY MASS INDEX: 29.87 KG/M2 | WEIGHT: 158.2 LBS | DIASTOLIC BLOOD PRESSURE: 94 MMHG | SYSTOLIC BLOOD PRESSURE: 150 MMHG | OXYGEN SATURATION: 98 % | HEART RATE: 94 BPM

## 2023-11-30 DIAGNOSIS — K21.9 GASTROESOPHAGEAL REFLUX DISEASE WITHOUT ESOPHAGITIS: Primary | ICD-10-CM

## 2023-11-30 DIAGNOSIS — Z86.010 HISTORY OF COLON POLYPS: ICD-10-CM

## 2023-11-30 DIAGNOSIS — Z80.0 FAMILY HISTORY OF COLON CANCER IN MOTHER: ICD-10-CM

## 2023-11-30 PROBLEM — Z86.0100 HISTORY OF COLON POLYPS: Status: ACTIVE | Noted: 2023-11-30

## 2023-11-30 PROCEDURE — 99213 OFFICE O/P EST LOW 20 MIN: CPT | Performed by: PHYSICIAN ASSISTANT

## 2023-11-30 NOTE — LETTER
November 30, 2023     Diego Craft MD  [address] [place] [address]    Patient: Rock Sams   YOB: 1945   Date of Visit: 11/30/2023       Dear Dr. Nya Argueta:    Thank you for referring Claudeen Farber to me for evaluation. Below are my notes for this consultation. If you have questions, please do not hesitate to call me. I look forward to following your patient along with you. Sincerely,        Sarah Rubio PA-C        CC: No Recipients    Sarah Rubio, [facility]  11/30/2023  1:09 PM  [facility] - Outpatient Progress Note  Rock Sams 70 y.o. female MRN: [id number]  Encounter: [id number]    Assessment and Plan    1. Gastroesophageal reflux disease without esophagitis  - Patient is currently stable on pantoprazole 40 mg daily. No current daytime or night time break through symptoms. No pain or difficulty swallowing. No unintentional weight loss. 2. Family history of colon cancer in mother  -Last colonoscopy March 2022 with recall March 2027    3. History of colon polyps  -Colonoscopy March 2022 with a hyperplastic polyp removed from splenic flexure, sigmoid diverticulosis and internal hemorrhoids noted. Prep was adequate  -Recall due to personal history of polyps and family history of colon cancer with her mom will be in March 2027      --------------------------------------------------------------------------------------------------------------------    Chief Complaint: Follow-up to GERD medication check and refill    HPI: Rock Sams is a 70 y.o. female new to me with past medical history of GERD, basal cell skin carcinoma, history of colon polyp, Hashimoto's disease, hyperlipidemia, osteopenia who presents today for follow up for follow-up to GERD. Patient is currently stable on pantoprazole 40 mg daily. No current daytime or night time break through symptoms.   No pain or difficulty swallowing. No unintentional weight loss. Patient reports an incidental belching and flatus. Patient denies any nausea, vomiting, abdominal pain, dysphagia, unexpected weight loss, diarrhea, constipation, blood in stool, or black tarry stools. Endoscopy History:  EGD -March 2022 done for GERD showed superficial linear erosion in the stomach. Gastric biopsy negative for H. pylori and gastric intestinal metaplasia. Stomach polyp also noted and polypectomy performed noting fundic gland polyp. Colonoscopy -March 2022 done for family history of colon cancer with her mother. The prep was adequate. A splenic flexure polyp was removed found to be hyperplastic on pathology. Sigmoid diverticulosis and internal hemorrhoids noted as well. Review of Systems:   General: negative for fatigue, fever, night sweats or unexpected weight loss  Psychological: negative for anxiety or depression  Ophthalmic: negative for blurry vision or scleral icterus  ENT: negative for headaches, sore throat or dysphagia  Hematological and Lymphatic: negative for pallor or swollen lymph nodes  Respiratory: negative for cough, shortness of breath or wheezing  Cardiovascular: negative for chest pain, edema or murmur  Gastrointestinal: as mentioned in HPI  Genito-Urinary: negative for dysuria or incontinence  Musculoskeletal: negative for joint pain, joint stiffness or joint swelling  Dermatological: negative for pruritus, rash, or jaundice    Current Medications  Current Outpatient Medications   Medication Sig Dispense Refill   • ascorbic acid (VITAMIN C) 500 mg tablet Take 500 mg by mouth daily. • atorvastatin (LIPITOR) 10 mg tablet TAKE 1 TABLET BY MOUTH  DAILY 90 tablet 3   • Biotin 40801 MCG TABS Take by mouth     • Calcium Carbonate-Vit D-Min (CALTRATE 600+D PLUS MINERALS PO) Take by mouth daily.      • levothyroxine 88 mcg tablet TAKE 1 TABLET BY MOUTH  DAILY 90 tablet 1   • loratadine (CLARITIN) 10 mg tablet Take 10 mg by mouth daily     • LORazepam (ATIVAN) 1 mg tablet TAKE 1 TABLET BY MOUTH DAILY AT  BEDTIME 30 tablet 1   • mometasone (NASONEX) 50 mcg/act nasal spray USE 2 SPRAYS IN BOTH NOSTRILS  DAILY 51 g 1   • Multiple Vitamins-Minerals (CENTRUM SILVER PO) Take by mouth daily. • pantoprazole (PROTONIX) 40 mg tablet TAKE 1 TABLET BY MOUTH  DAILY 90 tablet 0   • Sodium Fluoride 5000 Sensitive 1.1-5 % PSTE        No current facility-administered medications for this visit.        Past Medical History  Past Medical History:   Diagnosis Date   • Acid reflux    • Allergic 2001    Seasonal   • Basal cell carcinoma     basal cell on back and nose   • Cancer (HCC)     Basel cell   • Colon polyp    • Dental bridge present     upper   • Disease of thyroid gland    • GERD (gastroesophageal reflux disease)    • Hashimoto's disease    • High cholesterol    • Osteopenia    • Seasonal allergies     mold,pollen   • Thyroid disease     thyroid nodules   • Wears glasses        Past Surgical History  Past Surgical History:   Procedure Laterality Date   • COLONOSCOPY     • ESOPHAGOGASTRODUODENOSCOPY     • MOHS RECONSTRUCTION N/A 10/4/2016    Procedure: RECONSTRUCTION MOHS DEFECT NASAL TIP WITH FLAP;  Surgeon: Casandra Escalante MD;  [facility];  Service:    • NC EXC B9 LESION MRGN XCP SK TG T/A/L >4.0 CM N/A 3/19/2019    Procedure: MID UPPER BACK LIPOMA EXCISION;  Surgeon: Casandra Escalante MD;  [facility];  Service: Plastics   • NC REPAIR COMPLEX TRUNK 2.6-7.5 CM N/A 3/19/2019    Procedure: COMPLEX CLOSURE;  Surgeon: Casandra Escalante MD;  [facility];  Service: Plastics   • SKIN BIOPSY     • SKIN CANCER EXCISION      basal cell CA on back and nose-2009 and 2016   • TONSILLECTOMY     • UPPER GASTROINTESTINAL ENDOSCOPY         Past Social History   Social History     Socioeconomic History   • Marital status: /Civil Union     Spouse name: None   • Number of children: None   • Years of education: None • Highest education level: None   Occupational History   • None   Tobacco Use   • Smoking status: Former     Packs/day: 1.00     Years: 20.00     Total pack years: 20.00     Types: Cigarettes     Start date: 1965     Quit date: 1985     Years since quittin.6   • Smokeless tobacco: Never   • Tobacco comments:     quit 30 yrs ago   Vaping Use   • Vaping Use: Never used   Substance and Sexual Activity   • Alcohol use: Yes     Alcohol/week: 5.0 standard drinks of alcohol     Types: 5 Glasses of wine per week     Comment: 1 glass a night    • Drug use: No   • Sexual activity: Not Currently     Partners: Male     Birth control/protection: I.U.D. Other Topics Concern   • None   Social History Narrative    Daily coffee consumption; 1 cp/day    Daily cola consumption; ____ cp/day    Daily tea consumption; 1 cp/day    No domestic violence history    Uses seatbelts     Social Determinants of Health     Financial Resource Strain: Low Risk  (2023)    Overall Financial Resource Strain (CARDIA)    • Difficulty of Paying Living Expenses: Not hard at all   Food Insecurity: Not on file   Transportation Needs: No Transportation Needs (2023)    PRAPARE - Transportation    • Lack of Transportation (Medical): No    • Lack of Transportation (Non-Medical):  No   Physical Activity: Not on file   Stress: Not on file   Social Connections: Not on file   Intimate Partner Violence: Not on file   Housing Stability: Not on file       Vital Signs  Vitals:    23 1255   BP: 150/94   BP Location: Right arm   Patient Position: Sitting   Cuff Size: Standard   Pulse: 94   SpO2: 98%   Weight: 71.8 kg (158 lb 3.2 oz)   Height: 5' 1" (1.549 m)       Physical Exam:  General appearance: alert, cooperative, no distress  HEENT: normocephalic, anicteric, no eye erythema or discharge, no oropharyngeal thrush  Neck: supple  Lungs: CTA b/l, no rales, rhonchi, or wheezing, unlabored respirations  Heart: RRR, no murmur, rubs, or gallops  Abdomen: soft, non-tender, non-distended, normal bowel sounds, no masses or organomegaly  Rectal: deferred  Extremities: no cyanosis, clubbing, or edema  Musculoskeletal: normal gait  Skin: color and texture normal, no jaundice, no rashes or lesions  Psychiatric: alert and oriented, normal affect and behavior                                                          Jose J UNGER MandiElise Mckinely  11/30/2023  9:19 AM  Sign when Signing Visit  [facility] - Outpatient Progress Note  Cruz Parada 70 y.o. female MRN: [id number]  Encounter: [id number]    Assessment and Plan    1. Gastroesophageal reflux disease without esophagitis  - Patient is currently stable on pantoprazole 40 mg daily. No current daytime or night time break through symptoms. No pain or difficulty swallowing. No unintentional weight loss. 2. Family history of colon cancer in mother  -Last colonoscopy March 2022 with recall March 2027    3. History of colon polyps  -Colonoscopy March 2022 with a hyperplastic polyp removed from splenic flexure, sigmoid diverticulosis and internal hemorrhoids noted. Prep was adequate  -Recall due to personal history of polyps and family history of colon cancer with her mom will be in March 2027      --------------------------------------------------------------------------------------------------------------------    Chief Complaint: Follow-up to GERD medication check and refill    HPI: Cruz Parada is a 70 y.o. female new to me with past medical history of GERD, basal cell skin carcinoma, history of colon polyp, Hashimoto's disease, hyperlipidemia, osteopenia who presents today for follow up for follow-up to GERD. Patient is currently stable on pantoprazole 40 mg daily. No current daytime or night time break through symptoms. No pain or difficulty swallowing. No unintentional weight loss. Patient reports an incidental belching and flatus. Patient denies any nausea, vomiting, abdominal pain, dysphagia, unexpected weight loss, diarrhea, constipation, blood in stool, or black tarry stools. Endoscopy History:  EGD -March 2022 done for GERD showed superficial linear erosion in the stomach. Gastric biopsy negative for H. pylori and gastric intestinal metaplasia. Stomach polyp also noted and polypectomy performed noting fundic gland polyp. Colonoscopy -March 2022 done for family history of colon cancer with her mother. The prep was adequate. A splenic flexure polyp was removed found to be hyperplastic on pathology. Sigmoid diverticulosis and internal hemorrhoids noted as well. Review of Systems:   General: negative for fatigue, fever, night sweats or unexpected weight loss  Psychological: negative for anxiety or depression  Ophthalmic: negative for blurry vision or scleral icterus  ENT: negative for headaches, sore throat or dysphagia  Hematological and Lymphatic: negative for pallor or swollen lymph nodes  Respiratory: negative for cough, shortness of breath or wheezing  Cardiovascular: negative for chest pain, edema or murmur  Gastrointestinal: as mentioned in HPI  Genito-Urinary: negative for dysuria or incontinence  Musculoskeletal: negative for joint pain, joint stiffness or joint swelling  Dermatological: negative for pruritus, rash, or jaundice    Current Medications  Current Outpatient Medications   Medication Sig Dispense Refill   • ascorbic acid (VITAMIN C) 500 mg tablet Take 500 mg by mouth daily. • atorvastatin (LIPITOR) 10 mg tablet TAKE 1 TABLET BY MOUTH  DAILY 90 tablet 3   • Biotin 54493 MCG TABS Take by mouth     • Calcium Carbonate-Vit D-Min (CALTRATE 600+D PLUS MINERALS PO) Take by mouth daily.      • levothyroxine 88 mcg tablet TAKE 1 TABLET BY MOUTH  DAILY 90 tablet 1   • loratadine (CLARITIN) 10 mg tablet Take 10 mg by mouth daily     • LORazepam (ATIVAN) 1 mg tablet TAKE 1 TABLET BY MOUTH DAILY AT  BEDTIME 30 tablet 1 • mometasone (NASONEX) 50 mcg/act nasal spray USE 2 SPRAYS IN BOTH NOSTRILS  DAILY 51 g 1   • Multiple Vitamins-Minerals (CENTRUM SILVER PO) Take by mouth daily. • pantoprazole (PROTONIX) 40 mg tablet TAKE 1 TABLET BY MOUTH  DAILY 90 tablet 0   • Sodium Fluoride 5000 Sensitive 1.1-5 % PSTE        No current facility-administered medications for this visit.        Past Medical History  Past Medical History:   Diagnosis Date   • Acid reflux    • Allergic 2001    Seasonal   • Basal cell carcinoma     basal cell on back and nose   • Cancer (HCC)     Basel cell   • Colon polyp    • Dental bridge present     upper   • Disease of thyroid gland    • GERD (gastroesophageal reflux disease)    • Hashimoto's disease    • High cholesterol    • Osteopenia    • Seasonal allergies     mold,pollen   • Thyroid disease     thyroid nodules   • Wears glasses        Past Surgical History  Past Surgical History:   Procedure Laterality Date   • COLONOSCOPY     • ESOPHAGOGASTRODUODENOSCOPY     • MOHS RECONSTRUCTION N/A 10/4/2016    Procedure: RECONSTRUCTION MOHS DEFECT NASAL TIP WITH FLAP;  Surgeon: Edgar Moses MD;  [facility];  Service:    • HI EXC B9 LESION MRGN XCP SK TG T/A/L >4.0 CM N/A 3/19/2019    Procedure: MID UPPER BACK LIPOMA EXCISION;  Surgeon: Edgar Moses MD;  [facility];  Service: Plastics   • HI REPAIR COMPLEX TRUNK 2.6-7.5 CM N/A 3/19/2019    Procedure: COMPLEX CLOSURE;  Surgeon: Edgar Moses MD;  [facility];  Service: Plastics   • SKIN BIOPSY     • SKIN CANCER EXCISION      basal cell CA on back and nose-2009 and 2016   • TONSILLECTOMY     • UPPER GASTROINTESTINAL ENDOSCOPY         Past Social History   Social History     Socioeconomic History   • Marital status: /Civil Union     Spouse name: Not on file   • Number of children: Not on file   • Years of education: Not on file   • Highest education level: Not on file   Occupational History   • Not on file Tobacco Use   • Smoking status: Former     Packs/day: 1.00     Years: 20.00     Total pack years: 20.00     Types: Cigarettes     Start date: 1965     Quit date: 1985     Years since quittin.6   • Smokeless tobacco: Never   • Tobacco comments:     quit 30 yrs ago   Vaping Use   • Vaping Use: Never used   Substance and Sexual Activity   • Alcohol use: Yes     Alcohol/week: 5.0 standard drinks of alcohol     Types: 5 Glasses of wine per week     Comment: 1 glass a night    • Drug use: No   • Sexual activity: Not Currently     Partners: Male     Birth control/protection: I.U.D. Other Topics Concern   • Not on file   Social History Narrative    Daily coffee consumption; 1 cp/day    Daily cola consumption; ____ cp/day    Daily tea consumption; 1 cp/day    No domestic violence history    Uses seatbelts     Social Determinants of Health     Financial Resource Strain: Low Risk  (2023)    Overall Financial Resource Strain (CARDIA)    • Difficulty of Paying Living Expenses: Not hard at all   Food Insecurity: Not on file   Transportation Needs: No Transportation Needs (2023)    PRAPARE - Transportation    • Lack of Transportation (Medical): No    • Lack of Transportation (Non-Medical): No   Physical Activity: Not on file   Stress: Not on file   Social Connections: Not on file   Intimate Partner Violence: Not on file   Housing Stability: Not on file       Vital Signs  There were no vitals filed for this visit.     Physical Exam:  General appearance: alert, cooperative, no distress  HEENT: normocephalic, anicteric, no eye erythema or discharge, no oropharyngeal thrush  Neck: supple  Lungs: CTA b/l, no rales, rhonchi, or wheezing, unlabored respirations  Heart: RRR, no murmur, rubs, or gallops  Abdomen: soft, non-tender, non-distended, normal bowel sounds, no masses or organomegaly  Rectal: deferred  Extremities: no cyanosis, clubbing, or edema  Musculoskeletal: normal gait  Skin: color and texture normal, no jaundice, no rashes or lesions  Psychiatric: alert and oriented, normal affect and behavior                                                          Tyra Stack PA-C

## 2023-11-30 NOTE — PROGRESS NOTES
[facility] - Outpatient Progress Note  Dean Flores 70 y.o. female MRN: [id number]  Encounter: [id number]    Assessment and Plan    1. Gastroesophageal reflux disease without esophagitis  - Patient is currently stable on pantoprazole 40 mg daily. No current daytime or night time break through symptoms. No pain or difficulty swallowing. No unintentional weight loss. 2. Family history of colon cancer in mother  -Last colonoscopy March 2022 with recall March 2027    3. History of colon polyps  -Colonoscopy March 2022 with a hyperplastic polyp removed from splenic flexure, sigmoid diverticulosis and internal hemorrhoids noted. Prep was adequate  -Recall due to personal history of polyps and family history of colon cancer with her mom will be in March 2027      --------------------------------------------------------------------------------------------------------------------    Chief Complaint: Follow-up to GERD medication check and refill    HPI: Dean Flores is a 70 y.o. female new to me with past medical history of GERD, basal cell skin carcinoma, history of colon polyp, Hashimoto's disease, hyperlipidemia, osteopenia who presents today for follow up for follow-up to GERD. Patient is currently stable on pantoprazole 40 mg daily. No current daytime or night time break through symptoms. No pain or difficulty swallowing. No unintentional weight loss. Patient reports an incidental belching and flatus. Patient denies any nausea, vomiting, abdominal pain, dysphagia, unexpected weight loss, diarrhea, constipation, blood in stool, or black tarry stools. Endoscopy History:  EGD -March 2022 done for GERD showed superficial linear erosion in the stomach. Gastric biopsy negative for H. pylori and gastric intestinal metaplasia. Stomach polyp also noted and polypectomy performed noting fundic gland polyp.   Colonoscopy -March 2022 done for family history of colon cancer with her mother. The prep was adequate. A splenic flexure polyp was removed found to be hyperplastic on pathology. Sigmoid diverticulosis and internal hemorrhoids noted as well. Review of Systems:   General: negative for fatigue, fever, night sweats or unexpected weight loss  Psychological: negative for anxiety or depression  Ophthalmic: negative for blurry vision or scleral icterus  ENT: negative for headaches, sore throat or dysphagia  Hematological and Lymphatic: negative for pallor or swollen lymph nodes  Respiratory: negative for cough, shortness of breath or wheezing  Cardiovascular: negative for chest pain, edema or murmur  Gastrointestinal: as mentioned in HPI  Genito-Urinary: negative for dysuria or incontinence  Musculoskeletal: negative for joint pain, joint stiffness or joint swelling  Dermatological: negative for pruritus, rash, or jaundice    Current Medications  Current Outpatient Medications   Medication Sig Dispense Refill   • ascorbic acid (VITAMIN C) 500 mg tablet Take 500 mg by mouth daily. • atorvastatin (LIPITOR) 10 mg tablet TAKE 1 TABLET BY MOUTH  DAILY 90 tablet 3   • Biotin 22791 MCG TABS Take by mouth     • Calcium Carbonate-Vit D-Min (CALTRATE 600+D PLUS MINERALS PO) Take by mouth daily. • levothyroxine 88 mcg tablet TAKE 1 TABLET BY MOUTH  DAILY 90 tablet 1   • loratadine (CLARITIN) 10 mg tablet Take 10 mg by mouth daily     • LORazepam (ATIVAN) 1 mg tablet TAKE 1 TABLET BY MOUTH DAILY AT  BEDTIME 30 tablet 1   • mometasone (NASONEX) 50 mcg/act nasal spray USE 2 SPRAYS IN BOTH NOSTRILS  DAILY 51 g 1   • Multiple Vitamins-Minerals (CENTRUM SILVER PO) Take by mouth daily. • pantoprazole (PROTONIX) 40 mg tablet TAKE 1 TABLET BY MOUTH  DAILY 90 tablet 0   • Sodium Fluoride 5000 Sensitive 1.1-5 % PSTE        No current facility-administered medications for this visit.        Past Medical History  Past Medical History:   Diagnosis Date   • Acid reflux    • Allergic 2001 Seasonal   • Basal cell carcinoma     basal cell on back and nose   • Cancer ([address])     Basel cell   • Colon polyp    • Dental bridge present     upper   • Disease of thyroid gland    • GERD (gastroesophageal reflux disease)    • Hashimoto's disease    • High cholesterol    • Osteopenia    • Seasonal allergies     mold,pollen   • Thyroid disease     thyroid nodules   • Wears glasses        Past Surgical History  Past Surgical History:   Procedure Laterality Date   • COLONOSCOPY     • ESOPHAGOGASTRODUODENOSCOPY     • MOHS RECONSTRUCTION N/A 10/4/2016    Procedure: RECONSTRUCTION MOHS DEFECT NASAL TIP WITH FLAP;  Surgeon: Jose Watts MD;  [facility];  Service:    • KS EXC B9 LESION MRGN XCP SK TG T/A/L >4.0 CM N/A 3/19/2019    Procedure: MID UPPER BACK LIPOMA EXCISION;  Surgeon: Jose Watts MD;  [facility];  Service: Plastics   • KS REPAIR COMPLEX TRUNK 2.6-7.5 CM N/A 3/19/2019    Procedure: COMPLEX CLOSURE;  Surgeon: Jose Watts MD;  [facility];  Service: Plastics   • SKIN BIOPSY     • SKIN CANCER EXCISION      basal cell CA on back and nose- and    • TONSILLECTOMY     • UPPER GASTROINTESTINAL ENDOSCOPY         Past Social History   Social History     Socioeconomic History   • Marital status: /Civil Union     Spouse name: None   • Number of children: None   • Years of education: None   • Highest education level: None   Occupational History   • None   Tobacco Use   • Smoking status: Former     Packs/day: 1.00     Years: 20.00     Total pack years: 20.00     Types: Cigarettes     Start date: 1965     Quit date: 1985     Years since quittin.6   • Smokeless tobacco: Never   • Tobacco comments:     quit 30 yrs ago   Vaping Use   • Vaping Use: Never used   Substance and Sexual Activity   • Alcohol use: Yes     Alcohol/week: 5.0 standard drinks of alcohol     Types: 5 Glasses of wine per week     Comment: 1 glass a night    • Drug use:  No • Sexual activity: Not Currently     Partners: Male     Birth control/protection: I.U.D. Other Topics Concern   • None   Social History Narrative    Daily coffee consumption; 1 cp/day    Daily cola consumption; ____ cp/day    Daily tea consumption; 1 cp/day    No domestic violence history    Uses seatbelts     Social Determinants of Health     Financial Resource Strain: Low Risk  (4/13/2023)    Overall Financial Resource Strain (CARDIA)    • Difficulty of Paying Living Expenses: Not hard at all   Food Insecurity: Not on file   Transportation Needs: No Transportation Needs (4/13/2023)    PRAPARE - Transportation    • Lack of Transportation (Medical): No    • Lack of Transportation (Non-Medical): No   Physical Activity: Not on file   Stress: Not on file   Social Connections: Not on file   Intimate Partner Violence: Not on file   Housing Stability: Not on file       Vital Signs  Vitals:    11/30/23 1255   BP: 150/94   BP Location: Right arm   Patient Position: Sitting   Cuff Size: Standard   Pulse: 94   SpO2: 98%   Weight: 71.8 kg (158 lb 3.2 oz)   Height: 5' 1" (1.549 m)       Physical Exam:  General appearance: alert, cooperative, no distress  HEENT: normocephalic, anicteric, no eye erythema or discharge, no oropharyngeal thrush  Neck: supple  Lungs: CTA b/l, no rales, rhonchi, or wheezing, unlabored respirations  Heart: RRR, no murmur, rubs, or gallops  Abdomen: soft, non-tender, non-distended, normal bowel sounds, no masses or organomegaly  Rectal: deferred  Extremities: no cyanosis, clubbing, or edema  Musculoskeletal: normal gait  Skin: color and texture normal, no jaundice, no rashes or lesions  Psychiatric: alert and oriented, normal affect and behavior                                                          Sheryl Domingo PA-C

## 2023-12-04 ENCOUNTER — TELEPHONE (OUTPATIENT)
Age: 78
End: 2023-12-04

## 2023-12-04 DIAGNOSIS — J01.00 ACUTE NON-RECURRENT MAXILLARY SINUSITIS: Primary | ICD-10-CM

## 2023-12-04 RX ORDER — AZITHROMYCIN 250 MG/1
TABLET, FILM COATED ORAL
Qty: 6 TABLET | Refills: 0 | Status: SHIPPED | OUTPATIENT
Start: 2023-12-04 | End: 2023-12-08

## 2023-12-04 NOTE — TELEPHONE ENCOUNTER
The patient called she has been sick for two weeks    ---she went to [facility] the Sunday before thanksgiving    --they did not give her anything  she has pressure on the right side of her head   her teeth hurt   when she blows her nose it is bright red blood    --she has slight ear pain   she has post nasal drip   no sore throat no fever and no cough  she wants a z pack called into [facility]   she would like a call when it is called in thank you

## 2023-12-17 DIAGNOSIS — K25.3 ACUTE GASTRIC ULCER WITHOUT HEMORRHAGE OR PERFORATION: ICD-10-CM

## 2023-12-18 DIAGNOSIS — J30.9 CHRONIC ALLERGIC RHINITIS: ICD-10-CM

## 2023-12-18 DIAGNOSIS — F41.9 ANXIETY: ICD-10-CM

## 2023-12-18 RX ORDER — PANTOPRAZOLE SODIUM 40 MG/1
40 TABLET, DELAYED RELEASE ORAL DAILY
Qty: 90 TABLET | Refills: 1 | Status: SHIPPED | OUTPATIENT
Start: 2023-12-18

## 2023-12-19 RX ORDER — MOMETASONE FUROATE 50 UG/1
SPRAY, METERED NASAL
Qty: 51 G | Refills: 1 | Status: SHIPPED | OUTPATIENT
Start: 2023-12-19

## 2023-12-19 RX ORDER — LORAZEPAM 1 MG/1
TABLET ORAL
Qty: 30 TABLET | Refills: 0 | Status: SHIPPED | OUTPATIENT
Start: 2023-12-19

## 2023-12-19 NOTE — TELEPHONE ENCOUNTER
[id number] 11/29/2023 10/11/2023 LORazepam (Tablet) 30.0 30 1 MG NA YOKO LANDIS POGODZINSKI [facility] Medicare 1 / 1 PA     1 [id number] 10/13/2023 10/11/2023 LORazepam (Tablet) 30.0 30 1 MG NA YOKO LANDIS POGODZINSKI [facility] Medicare 0 / 1 PA    1 [id number] 08/28/2023 07/25/2023 LORazepam (Tablet) 30.0 30 1 MG NA ELSPETH BLACK-CAMARGO [facility] Medicare 0 / 0 PA    1 [id number] 06/12/2023 05/10/2023 LORazepam (Tablet) 30.0 30 1 MG NA YOKO LANDIS POGODZINSKI [facility] Medicare 0 / 1 PA

## 2024-01-04 ENCOUNTER — HOSPITAL ENCOUNTER (OUTPATIENT)
Dept: RADIOLOGY | Age: 79
Discharge: HOME | End: 2024-01-04
Payer: COMMERCIAL

## 2024-01-04 VITALS — HEIGHT: 61 IN | BODY MASS INDEX: 28.7 KG/M2 | WEIGHT: 152 LBS

## 2024-01-04 DIAGNOSIS — Z12.31 BREAST CANCER SCREENING BY MAMMOGRAM: ICD-10-CM

## 2024-01-04 PROCEDURE — 77067 SCR MAMMO BI INCL CAD: CPT

## 2024-01-04 PROCEDURE — 77063 BREAST TOMOSYNTHESIS BI: CPT

## 2024-01-24 DIAGNOSIS — F41.9 ANXIETY: ICD-10-CM

## 2024-01-24 RX ORDER — LORAZEPAM 1 MG/1
TABLET ORAL
Qty: 30 TABLET | Refills: 1 | Status: SHIPPED | OUTPATIENT
Start: 2024-01-24

## 2024-02-21 PROBLEM — Z01.419 ENCOUNTER FOR GYNECOLOGICAL EXAMINATION (GENERAL) (ROUTINE) WITHOUT ABNORMAL FINDINGS: Status: RESOLVED | Noted: 2020-07-02 | Resolved: 2024-02-21

## 2024-03-06 ENCOUNTER — PATIENT MESSAGE (OUTPATIENT)
Dept: FAMILY MEDICINE CLINIC | Facility: CLINIC | Age: 79
End: 2024-03-06

## 2024-04-01 DIAGNOSIS — F41.9 ANXIETY: ICD-10-CM

## 2024-04-02 NOTE — TELEPHONE ENCOUNTER
1 [id number] 03/05/2024 01/24/2024 LORazepam (Tablet) 30.0 30 1 MG NA YOKO LANDIS, SHRUTHI [facility] Medicare 1 / 1 PA    1 [id number] 01/25/2024 01/24/2024 LORazepam (Tablet) 30.0 30 1 MG NA YOKO LANDIS, SHRUTHI [facility] Medicare 0 / 1 PA    1 [id number] 12/21/2023 12/19/2023 LORazepam (Tablet) 30.0 30 1 MG NA YOKO LANDIS, SHRUTHI [facility] Medicare 0 / 0 PA    1 [id number] 11/29/2023 10/11/2023 LORazepam (Tablet) 30.0 30 1 MG YOKO LUCIANO, SHRUTHI [facility] Medicare 1 / 1 PA    1 [id number] 10/13/2023 10/11/2023 LORazepam (Tablet) 30.0 30 1 MG NEREIDA SUNNYYOKO MOSES, SHRUTHI [facility] Medicare 0 / 1 PA    1 [id number] 08/28/2023 07/25/2023 LORazepam (Tablet) 30.0 30 1 MG NA ELSPETH BLACK-CAMARGO [facility] Medicare 0 / 0 PA    1 [id number] 06/12/2023 05/10/2023 LORazepam (Tablet) 30.0 30 1 MG YOKO LUCIANO, SHRUTHI [facility] Medicare 0 / 1 PA    1 [id number] 05/15/2023 05/10/2023 LORazepam (Tablet) 30.0 30 1 MG NA SUNNYYOKO MOSES, SHRUTHI [facility] Medicare 0 / 1 PA    1 [id number] 04/21/2023 01/23/2023 LORazepam (Tablet) 30.0 30 1 MG NEREIDA BALAJILATOYAYOKO, SHRUTHI [facility] Medicare 3 / 3 PA

## 2024-04-03 RX ORDER — LORAZEPAM 1 MG/1
TABLET ORAL
Qty: 30 TABLET | Refills: 1 | Status: SHIPPED | OUTPATIENT
Start: 2024-04-03

## 2024-04-16 ENCOUNTER — RA CDI HCC (OUTPATIENT)
Dept: OTHER | Facility: HOSPITAL | Age: 79
End: 2024-04-16

## 2024-04-26 DIAGNOSIS — E03.9 HYPOTHYROIDISM, UNSPECIFIED TYPE: ICD-10-CM

## 2024-04-26 RX ORDER — LEVOTHYROXINE SODIUM 88 UG/1
TABLET ORAL
Qty: 90 TABLET | Refills: 3 | Status: SHIPPED | OUTPATIENT
Start: 2024-04-26 | End: 2024-05-08 | Stop reason: SDUPTHER

## 2024-04-27 DIAGNOSIS — K25.3 ACUTE GASTRIC ULCER WITHOUT HEMORRHAGE OR PERFORATION: ICD-10-CM

## 2024-04-29 RX ORDER — PANTOPRAZOLE SODIUM 40 MG/1
40 TABLET, DELAYED RELEASE ORAL DAILY
Qty: 90 TABLET | Refills: 1 | Status: SHIPPED | OUTPATIENT
Start: 2024-04-29

## 2024-05-01 ENCOUNTER — OFFICE VISIT (OUTPATIENT)
Dept: FAMILY MEDICINE CLINIC | Facility: CLINIC | Age: 79
End: 2024-05-01

## 2024-05-01 VITALS
BODY MASS INDEX: 28.41 KG/M2 | OXYGEN SATURATION: 98 % | DIASTOLIC BLOOD PRESSURE: 84 MMHG | WEIGHT: 154.4 LBS | HEART RATE: 78 BPM | HEIGHT: 62 IN | TEMPERATURE: 97.2 F | SYSTOLIC BLOOD PRESSURE: 130 MMHG

## 2024-05-01 DIAGNOSIS — E03.9 HYPOTHYROIDISM, UNSPECIFIED TYPE: ICD-10-CM

## 2024-05-01 DIAGNOSIS — Z00.00 MEDICARE ANNUAL WELLNESS VISIT, SUBSEQUENT: Primary | ICD-10-CM

## 2024-05-01 DIAGNOSIS — M85.80 OSTEOPENIA, UNSPECIFIED LOCATION: ICD-10-CM

## 2024-05-01 DIAGNOSIS — E78.2 MIXED HYPERLIPIDEMIA: ICD-10-CM

## 2024-05-01 DIAGNOSIS — Z78.0 ASYMPTOMATIC POSTMENOPAUSAL STATE: ICD-10-CM

## 2024-05-01 DIAGNOSIS — K21.9 GASTROESOPHAGEAL REFLUX DISEASE WITHOUT ESOPHAGITIS: ICD-10-CM

## 2024-05-01 NOTE — PROGRESS NOTES
" Assessment and Plan:     Problem List Items Addressed This Visit        Digestive    Esophageal reflux     Occ episodes (?once a week).  Continue pantoprazole. Monitor diet. Recheck 1y - earlier if worse         Relevant Orders    CBC and differential    Comprehensive metabolic panel       Endocrine    Hypothyroidism     Appears to be stable clinically. Check TSH. Adjust meds if TSH is not at goal. Recheck 6m             Relevant Orders    TSH, 3rd generation with Free T4 reflex       Musculoskeletal and Integument    Osteopenia    Relevant Orders    DXA bone density spine hip and pelvis       Other    Hyperlipidemia     Check labs. Continue present care - adjust dose if not at goal.  Recheck 6m         Relevant Orders    Lipid panel   Other Visit Diagnoses     Medicare annual wellness visit, subsequent    -  Primary    Asymptomatic postmenopausal state        Relevant Orders    DXA bone density spine hip and pelvis             Preventive health issues were discussed with patient, and age appropriate screening tests were ordered as noted in patient's After Visit Summary.  Personalized health advice and appropriate referrals for health education or preventive services given if needed, as noted in patient's After Visit Summary.     History of Present Illness:     Patient presents for a Medicare Wellness Visit    f/u multiple med issues and AWV  - pt  tired and \"stressed\" over her 's multiple medical issues  - pt still under high stress secondary to multiple medical issues. Pt tries to walk and read for stress reduction. Occ depressed mood. Pt denies CP, palp, lightheadedness or other CV symptoms with or without exertion.   - back is stable.    - has hearing aids that help only a little  - pt denies any new GI or  complaints.  Pt to see Gi in a couple of weeks. To have mammo in january  - no new extremity complaints  - AWV done             Patient Care Team:  Clifton Murray MD as PCP - General  Tiffanie " MD Aden     Review of Systems:     Review of Systems   Constitutional:  Positive for fatigue. Negative for activity change and appetite change.   HENT: Negative.     Eyes: Negative.    Respiratory: Negative.     Cardiovascular: Negative.    Gastrointestinal: Negative.    Genitourinary: Negative.    Musculoskeletal: Negative.    Skin: Negative.    Neurological: Negative.    Psychiatric/Behavioral:  Positive for dysphoric mood (occasional). Negative for sleep disturbance. The patient is not nervous/anxious.         Problem List:     Patient Active Problem List   Diagnosis   • Esophageal reflux   • Hyperlipidemia   • Hypothyroidism   • White coat syndrome with high blood pressure without hypertension   • Basal cell carcinoma of skin of nose   • Nontoxic single thyroid nodule   • Anxiety   • Hypercalcemia   • Irregular cardiac rhythm   • Family history of colon cancer in mother   • History of colon polyps   • Osteopenia      Past Medical and Surgical History:     Past Medical History:   Diagnosis Date   • Acid reflux    • Allergic 2001    Seasonal   • Basal cell carcinoma     basal cell on back and nose   • Cancer (HCC)     Basel cell   • Colon polyp    • Dental bridge present     upper   • Disease of thyroid gland    • GERD (gastroesophageal reflux disease)    • Hashimoto's disease    • High cholesterol    • HL (hearing loss)    • Osteopenia    • Seasonal allergies     mold,pollen   • Thyroid disease     thyroid nodules   • Wears glasses      Past Surgical History:   Procedure Laterality Date   • COLONOSCOPY     • ESOPHAGOGASTRODUODENOSCOPY     • MOHS RECONSTRUCTION N/A 10/4/2016    Procedure: RECONSTRUCTION MOHS DEFECT NASAL TIP WITH FLAP;  Surgeon: Lino Clifford MD;  Location: [facility];  Service:    • IN EXC B9 LESION MRGN XCP SK TG T/A/L >4.0 CM N/A 3/19/2019    Procedure: MID UPPER BACK LIPOMA EXCISION;  Surgeon: Lino Clifford MD;  [facility];  Service: Plastics   • IN REPAIR COMPLEX  TRUNK 2.6-7.5 CM N/A 3/19/2019    Procedure: COMPLEX CLOSURE;  Surgeon: Lino Clifford MD;  [facility];  Service: Plastics   • SKIN BIOPSY     • SKIN CANCER EXCISION      basal cell CA on back and nose- and 2016   • TONSILLECTOMY     • UPPER GASTROINTESTINAL ENDOSCOPY        Family History:     Family History   Problem Relation Age of Onset   • Colon cancer Mother 63   • Anxiety disorder Mother         Agoraphobia   • Pancreatic cancer Father 57   • Thyroid cancer Father 53   • Cancer Father         Thyroid Pancreatic   • Coronary artery disease Paternal Grandmother    • Breast cancer Cousin 55   • No Known Problems Maternal Aunt    • No Known Problems Sister       Social History:     Social History     Socioeconomic History   • Marital status: /Civil Union     Spouse name: None   • Number of children: None   • Years of education: None   • Highest education level: None   Occupational History   • None   Tobacco Use   • Smoking status: Former     Current packs/day: 0.00     Average packs/day: 1 pack/day for 20.0 years (20.0 ttl pk-yrs)     Types: Cigarettes     Start date: 1965     Quit date: 1985     Years since quittin.1   • Smokeless tobacco: Never   • Tobacco comments:     quit 30 yrs ago   Vaping Use   • Vaping status: Never Used   Substance and Sexual Activity   • Alcohol use: Yes     Alcohol/week: 5.0 standard drinks of alcohol     Types: 5 Glasses of wine per week     Comment: 1 glass a night    • Drug use: No   • Sexual activity: Not Currently     Partners: Male     Birth control/protection: I.U.D.   Other Topics Concern   • None   Social History Narrative    Daily coffee consumption; 1 cp/day    Daily cola consumption; ____ cp/day    Daily tea consumption; 1 cp/day    No domestic violence history    Uses seatbelts     Social Determinants of Health     Financial Resource Strain: Low Risk  (2023)    Overall Financial Resource Strain (CARDIA)    • Difficulty of  Paying Living Expenses: Not hard at all   Food Insecurity: No Food Insecurity (4/30/2024)    Hunger Vital Sign    • Worried About Running Out of Food in the Last Year: Never true    • Ran Out of Food in the Last Year: Never true   Transportation Needs: No Transportation Needs (4/30/2024)    PRAPARE - Transportation    • Lack of Transportation (Medical): No    • Lack of Transportation (Non-Medical): No   Physical Activity: Not on file   Stress: Not on file   Social Connections: Not on file   Intimate Partner Violence: Not on file   Housing Stability: Low Risk  (4/30/2024)    Housing Stability Vital Sign    • Unable to Pay for Housing in the Last Year: No    • Number of Places Lived in the Last Year: 1    • Unstable Housing in the Last Year: No      Medications and Allergies:     Current Outpatient Medications   Medication Sig Dispense Refill   • ascorbic acid (VITAMIN C) 500 mg tablet Take 500 mg by mouth daily.     • atorvastatin (LIPITOR) 10 mg tablet TAKE 1 TABLET BY MOUTH  DAILY 90 tablet 3   • Biotin 45115 MCG TABS Take by mouth     • Calcium Carbonate-Vit D-Min (CALTRATE 600+D PLUS MINERALS PO) Take by mouth daily.     • levothyroxine 88 mcg tablet TAKE 1 TABLET BY MOUTH DAILY 90 tablet 3   • loratadine (CLARITIN) 10 mg tablet Take 10 mg by mouth daily     • LORazepam (ATIVAN) 1 mg tablet TAKE 1 TABLET BY MOUTH DAILY AT  BEDTIME 30 tablet 1   • mometasone (NASONEX) 50 mcg/act nasal spray USE 2 SPRAYS IN BOTH NOSTRILS  DAILY 51 g 1   • Multiple Vitamins-Minerals (CENTRUM SILVER PO) Take by mouth daily.     • pantoprazole (PROTONIX) 40 mg tablet TAKE 1 TABLET BY MOUTH DAILY 90 tablet 1   • Sodium Fluoride 5000 Sensitive 1.1-5 % PSTE        No current facility-administered medications for this visit.     Allergies   Allergen Reactions   • Avelox [Moxifloxacin] Hives   • Augmentin [Amoxicillin-Pot Clavulanate] Rash      Immunizations:     Immunization History   Administered Date(s) Administered   • COVID-19 PFIZER  VACCINE 0.3 ML IM 02/17/2021, 03/08/2021, 10/02/2021   • COVID-19 Pfizer Vac BIVALENT Saul-sucrose 12 Yr+ IM 09/29/2022   • COVID-19 Pfizer mRNA vacc PF saul-sucrose 12 yr and older (Comirnaty) 10/28/2023   • COVID-19 Pfizer vac (Saul-sucrose, gray cap) 12 yr+ IM 04/25/2022   • INFLUENZA 10/15/2022   • Influenza Split High Dose Preservative Free IM 10/16/2012, 10/03/2013, 10/14/2014, 09/22/2015, 09/14/2016, 08/30/2017   • Influenza, high dose seasonal 0.7 mL 09/25/2018, 09/26/2019, 10/07/2020, 09/07/2021, 09/21/2023   • Influenza, seasonal, injectable 09/01/2011   • Pneumococcal Conjugate 13-Valent 12/01/2016   • Pneumococcal Polysaccharide PPV23 11/06/2013   • TD (adult) Preservative Free 01/23/2019   • Tuberculin Skin Test-PPD Intradermal 04/20/2004      Health Maintenance:         Topic Date Due   • DXA SCAN  07/12/2024   • Breast Cancer Screening: Mammogram  01/04/2025   • Cervical Cancer Screening  08/23/2025   • Hepatitis C Screening  Completed   • Colorectal Cancer Screening  Discontinued         Topic Date Due   • COVID-19 Vaccine (7 - 2023-24 season) 12/23/2023      Medicare Screening Tests and Risk Assessments:     Silvia is here for her Subsequent Wellness visit. Last Medicare Wellness visit information reviewed, patient interviewed and updates made to the record today.      Health Risk Assessment:   Patient rates overall health as very good. Patient feels that their physical health rating is same. Patient is very satisfied with their life. Eyesight was rated as same. Hearing was rated as slightly worse. Patient feels that their emotional and mental health rating is same. Patients states they are never, rarely angry. Patient states they are sometimes unusually tired/fatigued. Pain experienced in the last 7 days has been none. Patient states that she has experienced no weight loss or gain in last 6 months.     Depression Screening:   PHQ-2 Score: 1      Fall Risk Screening:   In the past year, patient has  experienced: no history of falling in past year      Urinary Incontinence Screening:   Patient has not leaked urine accidently in the last six months.     Home Safety:  Patient does not have trouble with stairs inside or outside of their home. Patient has working smoke alarms and has working carbon monoxide detector. Home safety hazards include: none.     Nutrition:   Current diet is Regular.     Medications:   Patient is currently taking over-the-counter supplements. OTC medications include: see medication list. Patient is able to manage medications.     Activities of Daily Living (ADLs)/Instrumental Activities of Daily Living (IADLs):   Walk and transfer into and out of bed and chair?: Yes  Dress and groom yourself?: Yes    Bathe or shower yourself?: Yes    Feed yourself? Yes  Do your laundry/housekeeping?: Yes  Manage your money, pay your bills and track your expenses?: Yes  Make your own meals?: Yes    Do your own shopping?: Yes    Previous Hospitalizations:   Any hospitalizations or ED visits within the last 12 months?: No      Advance Care Planning:   Living will: Yes    Durable POA for healthcare: No    Advanced directive: Yes    Advanced directive counseling given: Yes      Cognitive Screening:   Provider or family/friend/caregiver concerned regarding cognition?: No    PREVENTIVE SCREENINGS      Cardiovascular Screening:    General: Screening Not Indicated and History Lipid Disorder      Diabetes Screening:     General: Screening Current      Colorectal Cancer Screening:     General: Risks and Benefits Discussed and Screening Current      Breast Cancer Screening:     General: Screening Current      Cervical Cancer Screening:    General: Screening Not Indicated      Osteoporosis Screening:    General: Screening Current      Abdominal Aortic Aneurysm (AAA) Screening:        General: Screening Not Indicated      Lung Cancer Screening:     General: Screening Not Indicated      Hepatitis C Screening:    General:  Screening Current    Screening, Brief Intervention, and Referral to Treatment (SBIRT)    Screening  Typical number of drinks in a day: 1  Typical number of drinks in a week: 7  Interpretation: Low risk drinking behavior.    AUDIT-C Screenin) How often did you have a drink containing alcohol in the past year? 2 to 3 times a week  2) How many drinks did you have on a typical day when you were drinking in the past year? 1 to 2  3) How often did you have 6 or more drinks on one occasion in the past year? never    AUDIT-C Score: 3  Interpretation: Score 3-12 (female): POSITIVE screen for alcohol misuse    AUDIT Screenin) How often during the last year have you found that you were not able to stop drinking once you had started? 0 - never  5) How often during the last year have you failed to do what was normally expected from you because of drinking? 0 - never  6) How often during the last year have you needed a first drink in the morning to get yourself going after a heavy drinking session? 0 - never  7) How often during the last year have you had a feeling of guilt or remorse after drinking? 0 - never  8) How often during the last year have you been unable to remember what happened the night before because you had been drinking? 0 - never  9) Have you or someone else been injured as a result of your drinking? 0 - no  10) Has a relative or friend or a doctor or another health worker been concerned about your drinking or suggested you cut down? 0 - no    AUDIT Score: 3  Interpretation: Low risk alcohol consumption    Single Item Drug Screening:  How often have you used an illegal drug (including marijuana) or a prescription medication for non-medical reasons in the past year? never    Single Item Drug Screen Score: 0  Interpretation: Negative screen for possible drug use disorder    Time Spent  Time spent screening/evaluating the patient for alcohol misuse: 5 minutes.     Annual Depression Screening  Time spent  "screening and evaluating the patient for depression during today's encounter was 5 minutes.    Other Counseling Topics:   Calcium and vitamin D intake and regular weightbearing exercise.     No results found.     Physical Exam:     /84 (BP Location: Left arm, Patient Position: Sitting, Cuff Size: Adult)   Pulse 78   Temp (!) 97.2 °F (36.2 °C)   Ht 5' 1.89\" (1.572 m)   Wt 70 kg (154 lb 6.4 oz)   SpO2 98%   BMI 28.34 kg/m²     Physical Exam  Vitals reviewed.   HENT:      Head: Normocephalic.      Right Ear: Tympanic membrane, ear canal and external ear normal.      Left Ear: Tympanic membrane, ear canal and external ear normal.      Ears:      Comments: Hearing aids removed for exam     Nose: Nose normal.      Mouth/Throat:      Mouth: Mucous membranes are moist.   Eyes:      Extraocular Movements: Extraocular movements intact.      Conjunctiva/sclera: Conjunctivae normal.      Pupils: Pupils are equal, round, and reactive to light.   Cardiovascular:      Rate and Rhythm: Normal rate and regular rhythm.      Pulses: Normal pulses.   Pulmonary:      Effort: Pulmonary effort is normal.   Abdominal:      General: There is no distension.      Palpations: There is no mass.      Tenderness: There is no abdominal tenderness.   Musculoskeletal:         General: No swelling, tenderness or deformity. Normal range of motion.      Cervical back: Normal range of motion. No tenderness.      Right lower leg: No edema.      Left lower leg: No edema.   Lymphadenopathy:      Cervical: No cervical adenopathy.   Skin:     General: Skin is warm.      Capillary Refill: Capillary refill takes less than 2 seconds.   Neurological:      General: No focal deficit present.      Mental Status: She is alert.      Cranial Nerves: No cranial nerve deficit.      Sensory: No sensory deficit.      Motor: No weakness.      Gait: Gait normal.      Comments: Minicog 5/5   Psychiatric:         Mood and Affect: Mood normal.         Behavior: " Behavior normal.         Thought Content: Thought content normal.         Judgment: Judgment normal.      Comments: PHQ-2/9 Depression Screening    Little interest or pleasure in doing things: 0 - not at all  Feeling down, depressed, or hopeless: 1 - several days  PHQ-2 Score: 1  PHQ-2 Interpretation: Negative depression screen          Clifton Murray MD

## 2024-05-07 ENCOUNTER — APPOINTMENT (OUTPATIENT)
Dept: LAB | Facility: CLINIC | Age: 79
End: 2024-05-07
Payer: COMMERCIAL

## 2024-05-07 DIAGNOSIS — K21.9 GASTROESOPHAGEAL REFLUX DISEASE WITHOUT ESOPHAGITIS: ICD-10-CM

## 2024-05-07 DIAGNOSIS — E78.2 MIXED HYPERLIPIDEMIA: ICD-10-CM

## 2024-05-07 DIAGNOSIS — E03.9 HYPOTHYROIDISM, UNSPECIFIED TYPE: ICD-10-CM

## 2024-05-07 LAB
ALBUMIN SERPL BCP-MCNC: 3.9 G/DL (ref 3.5–5)
ALP SERPL-CCNC: 44 U/L (ref 34–104)
ALT SERPL W P-5'-P-CCNC: 13 U/L (ref 7–52)
ANION GAP SERPL CALCULATED.3IONS-SCNC: 4 MMOL/L (ref 4–13)
AST SERPL W P-5'-P-CCNC: 15 U/L (ref 13–39)
BASOPHILS # BLD AUTO: 0.01 THOUSANDS/ÂΜL (ref 0–0.1)
BASOPHILS NFR BLD AUTO: 0 % (ref 0–1)
BILIRUB SERPL-MCNC: 0.75 MG/DL (ref 0.2–1)
BUN SERPL-MCNC: 16 MG/DL (ref 5–25)
CALCIUM SERPL-MCNC: 9.9 MG/DL (ref 8.4–10.2)
CHLORIDE SERPL-SCNC: 106 MMOL/L (ref 96–108)
CHOLEST SERPL-MCNC: 175 MG/DL
CO2 SERPL-SCNC: 29 MMOL/L (ref 21–32)
CREAT SERPL-MCNC: 0.62 MG/DL (ref 0.6–1.3)
EOSINOPHIL # BLD AUTO: 0.13 THOUSAND/ÂΜL (ref 0–0.61)
EOSINOPHIL NFR BLD AUTO: 3 % (ref 0–6)
ERYTHROCYTE [DISTWIDTH] IN BLOOD BY AUTOMATED COUNT: 12.7 % (ref 11.6–15.1)
GFR SERPL CREATININE-BSD FRML MDRD: 87 ML/MIN/1.73SQ M
GLUCOSE P FAST SERPL-MCNC: 94 MG/DL (ref 65–99)
HCT VFR BLD AUTO: 40.8 % (ref 34.8–46.1)
HDLC SERPL-MCNC: 74 MG/DL
HGB BLD-MCNC: 13.1 G/DL (ref 11.5–15.4)
IMM GRANULOCYTES # BLD AUTO: 0.01 THOUSAND/UL (ref 0–0.2)
IMM GRANULOCYTES NFR BLD AUTO: 0 % (ref 0–2)
LDLC SERPL CALC-MCNC: 79 MG/DL (ref 0–100)
LYMPHOCYTES # BLD AUTO: 1.4 THOUSANDS/ÂΜL (ref 0.6–4.47)
LYMPHOCYTES NFR BLD AUTO: 35 % (ref 14–44)
MCH RBC QN AUTO: 30.3 PG (ref 26.8–34.3)
MCHC RBC AUTO-ENTMCNC: 32.1 G/DL (ref 31.4–37.4)
MCV RBC AUTO: 94 FL (ref 82–98)
MONOCYTES # BLD AUTO: 0.34 THOUSAND/ÂΜL (ref 0.17–1.22)
MONOCYTES NFR BLD AUTO: 9 % (ref 4–12)
NEUTROPHILS # BLD AUTO: 2.1 THOUSANDS/ÂΜL (ref 1.85–7.62)
NEUTS SEG NFR BLD AUTO: 53 % (ref 43–75)
NONHDLC SERPL-MCNC: 101 MG/DL
NRBC BLD AUTO-RTO: 0 /100 WBCS
PLATELET # BLD AUTO: 170 THOUSANDS/UL (ref 149–390)
PMV BLD AUTO: 11.4 FL (ref 8.9–12.7)
POTASSIUM SERPL-SCNC: 4.4 MMOL/L (ref 3.5–5.3)
PROT SERPL-MCNC: 6.4 G/DL (ref 6.4–8.4)
RBC # BLD AUTO: 4.32 MILLION/UL (ref 3.81–5.12)
SODIUM SERPL-SCNC: 139 MMOL/L (ref 135–147)
T4 FREE SERPL-MCNC: 1.25 NG/DL (ref 0.61–1.12)
TRIGL SERPL-MCNC: 112 MG/DL
TSH SERPL DL<=0.05 MIU/L-ACNC: 0.36 UIU/ML (ref 0.45–4.5)
WBC # BLD AUTO: 3.99 THOUSAND/UL (ref 4.31–10.16)

## 2024-05-07 PROCEDURE — 36415 COLL VENOUS BLD VENIPUNCTURE: CPT

## 2024-05-07 PROCEDURE — 80053 COMPREHEN METABOLIC PANEL: CPT

## 2024-05-07 PROCEDURE — 84443 ASSAY THYROID STIM HORMONE: CPT

## 2024-05-07 PROCEDURE — 85025 COMPLETE CBC W/AUTO DIFF WBC: CPT

## 2024-05-07 PROCEDURE — 80061 LIPID PANEL: CPT

## 2024-05-07 PROCEDURE — 84439 ASSAY OF FREE THYROXINE: CPT

## 2024-05-08 DIAGNOSIS — E03.9 HYPOTHYROIDISM, UNSPECIFIED TYPE: ICD-10-CM

## 2024-05-08 RX ORDER — LEVOTHYROXINE SODIUM 0.07 MG/1
75 TABLET ORAL DAILY
Qty: 30 TABLET | Refills: 5 | Status: SHIPPED | OUTPATIENT
Start: 2024-05-08

## 2024-05-21 DIAGNOSIS — E03.9 HYPOTHYROIDISM, UNSPECIFIED TYPE: ICD-10-CM

## 2024-05-21 RX ORDER — LEVOTHYROXINE SODIUM 0.07 MG/1
75 TABLET ORAL DAILY
Qty: 90 TABLET | Refills: 1 | Status: SHIPPED | OUTPATIENT
Start: 2024-05-21

## 2024-05-23 DIAGNOSIS — F41.9 ANXIETY: ICD-10-CM

## 2024-05-24 RX ORDER — LORAZEPAM 1 MG/1
TABLET ORAL
Qty: 30 TABLET | Refills: 1 | Status: SHIPPED | OUTPATIENT
Start: 2024-05-24

## 2024-05-24 NOTE — TELEPHONE ENCOUNTER
[id number] 04/29/2024 04/03/2024 LORazepam (Tablet) 30.0 30 1 MG NA YOKO LANDIS, SHRUTHI [facility] Medicare 1 / 1 PA     1 [id number] 04/03/2024 04/03/2024 LORazepam (Tablet) 30.0 30 1 MG NA YOKO LANDIS, SHRUTHI [facility] Medicare 0 / 1 PA    1 [id number] 03/05/2024 01/24/2024 LORazepam (Tablet) 30.0 30 1 MG NA YOKO LANDIS, SHRUTHI [facility] Medicare 1 / 1 PA    1 [id number] 01/25/2024 01/24/2024 LORazepam (Tablet) 30.0 30 1 MG NA YOKO LANDIS, SHRUTHI [facility] Medicare 0 / 1 PA    1 [id number] 12/21/2023 12/19/2023 LORazepam (Tablet) 30.0 30 1 MG YOKO LUCIANO, SHRUTHI [facility] Medicare 0 / 0 PA    1 [id number] 11/29/2023 10/11/2023 LORazepam (Tablet) 30.0 30 1 MG YOKO LUCIANO, SHRUTHI [facility] Medicare 1 / 1 PA    1 [id number] 10/13/2023 10/11/2023 LORazepam (Tablet) 30.0 30 1 MG YOKO LUCIANO, SHRUTHI [facility] Medicare 0 / 1 PA    1 [id number] 08/28/2023 07/25/2023 LORazepam (Tablet) 30.0 30 1 MG NA SADE BLACK-CAMARGO [facility] Medicare 0 / 0 PA    1 [id number] 06/12/2023 05/10/2023 LORazepam (Tablet) 30.0 30 1 MG NA BALAJIYOKO KLEIN, SHRUTHI [facility] Medicare 0 / 1 PA

## 2024-06-27 DIAGNOSIS — E78.2 MIXED HYPERLIPIDEMIA: ICD-10-CM

## 2024-06-27 RX ORDER — ATORVASTATIN CALCIUM 10 MG/1
TABLET, FILM COATED ORAL
Qty: 90 TABLET | Refills: 1 | Status: SHIPPED | OUTPATIENT
Start: 2024-06-27

## 2024-07-23 DIAGNOSIS — F41.9 ANXIETY: ICD-10-CM

## 2024-07-24 RX ORDER — LORAZEPAM 1 MG/1
TABLET ORAL
Qty: 30 TABLET | Refills: 1 | Status: SHIPPED | OUTPATIENT
Start: 2024-07-24

## 2024-07-24 NOTE — TELEPHONE ENCOUNTER
1 [id number] 07/01/2024 05/24/2024 LORazepam (Tablet) 30.0 30 1 MG YOKO LUCIANO, SHRUTHI [facility] Medicare 1 / 1 PA    1 [id number] 05/25/2024 05/24/2024 LORazepam (Tablet) 30.0 30 1 MG YOKO LUCIANO, SHRUTHI [facility] Medicare 0 / 1 PA    1 [id number] 04/29/2024 04/03/2024 LORazepam (Tablet) 30.0 30 1 MG YOKO LUCIANO, SHRUTHI [facility] Medicare 1 / 1 PA    1 [id number] 04/03/2024 04/03/2024 LORazepam (Tablet) 30.0 30 1 MG YOKO LUCIANO, SHRUTHI [facility] Medicare 0 / 1 PA    1 [id number] 03/05/2024 01/24/2024 LORazepam (Tablet) 30.0 30 1 MG NEREIDA SUNNYYOKO MOSES, SHRUTHI [facility] Medicare 1 / 1 PA    1 [id number] 01/25/2024 01/24/2024 LORazepam (Tablet) 30.0 30 1 MG YOKO LUCIANO, SHRUTHI [facility] Medicare 0 / 1 PA    1 [id number] 12/21/2023 12/19/2023 LORazepam (Tablet) 30.0 30 1 MG NEREIDA SUNNYYOKO MOSES, SHRUTHI [facility] Medicare 0 / 0 PA    1 [id number] 11/29/2023 10/11/2023 LORazepam (Tablet) 30.0 30 1 MG YOKO LUCIANO, SHRUTHI [facility] Medicare 1 / 1 PA    1 [id number] 10/13/2023 10/11/2023 LORazepam (Tablet) 30.0 30 1 MG YOKO LUCIANO, SHRUTHI [facility] Medicare 0 / 1 PA    1 [id number] 08/28/2023 07/25/2023 LORazepam (Tablet) 30.0 30 1 MG NA ELSPETH BLACK-CAMARGO [facility] Medicare 0 / 0 PA

## 2024-08-08 ENCOUNTER — TELEPHONE (OUTPATIENT)
Age: 79
End: 2024-08-08

## 2024-08-08 DIAGNOSIS — E03.9 HYPOTHYROIDISM, UNSPECIFIED TYPE: Primary | ICD-10-CM

## 2024-08-08 NOTE — TELEPHONE ENCOUNTER
Pt called and stated that she was told when they called with blood work results that they wanted her thyroid TSH rechecked in 3months (her appt was in may) can we please put that order in for her to complete. Thank you

## 2024-08-12 ENCOUNTER — APPOINTMENT (OUTPATIENT)
Dept: LAB | Facility: CLINIC | Age: 79
End: 2024-08-12
Payer: COMMERCIAL

## 2024-08-12 DIAGNOSIS — E03.9 HYPOTHYROIDISM, UNSPECIFIED TYPE: ICD-10-CM

## 2024-08-12 LAB — TSH SERPL DL<=0.05 MIU/L-ACNC: 1.61 UIU/ML (ref 0.45–4.5)

## 2024-08-12 PROCEDURE — 84443 ASSAY THYROID STIM HORMONE: CPT

## 2024-08-12 PROCEDURE — 36415 COLL VENOUS BLD VENIPUNCTURE: CPT

## 2024-08-13 NOTE — TELEPHONE ENCOUNTER
Pt returned missed call from office to discuss lab results; relayed result message per provider:          Pt expressed understanding & had no f/u questions.    Please complete Result Management task

## 2024-08-15 ENCOUNTER — OFFICE VISIT (OUTPATIENT)
Dept: OBGYN CLINIC | Facility: CLINIC | Age: 79
End: 2024-08-15
Payer: COMMERCIAL

## 2024-08-15 VITALS
WEIGHT: 157 LBS | HEIGHT: 62 IN | BODY MASS INDEX: 28.89 KG/M2 | DIASTOLIC BLOOD PRESSURE: 82 MMHG | SYSTOLIC BLOOD PRESSURE: 150 MMHG

## 2024-08-15 DIAGNOSIS — Z12.31 ENCOUNTER FOR SCREENING MAMMOGRAM FOR MALIGNANT NEOPLASM OF BREAST: ICD-10-CM

## 2024-08-15 DIAGNOSIS — Z01.419 WELL WOMAN EXAM WITH ROUTINE GYNECOLOGICAL EXAM: Primary | ICD-10-CM

## 2024-08-15 PROCEDURE — G0101 CA SCREEN;PELVIC/BREAST EXAM: HCPCS | Performed by: STUDENT IN AN ORGANIZED HEALTH CARE EDUCATION/TRAINING PROGRAM

## 2024-08-15 NOTE — PROGRESS NOTES
[facility]  Well Woman Annual Exam  Dee Flores MD  08/15/24    Assessment   79 y.o. postmenopausal female presenting for annual exam.     Plan     Cervical cancer screening: Pap history uncomplicated- Reviewed ASCCP guidelines of disc ontinuing pap smears at age 65 in low risk patients with normal cervical cancer screening in the last 10 years  STD screening: Not sexually active  Breast cancer screening: Last Mammogram: 2024- BIRADS 2, mammogram ordered today as patient would like to continue given family history. Reviewed ACOG recommendations.  Colon cancer screening: Last Colonoscopy , Patient due   BMD screening: ordered by PCP    I have discussed the importance of monthly self-breast exams, exercise and healthy diet as well as adequate intake of calcium and vitamin D.     All questions have been answered to her satisfaction.    Follow up in 2 years.  __________________________________________________________________      Subjective     79 y.o. postmenopausal female presenting for annual exam. She is without complaint.    GYN  Complaints: denies  Denies genital discharge, genital ulcers, pelvic pain, and vulvar/vaginal symptoms  Denies any vaginal bleeding  Reports history of fibroid uterus  Menopausal symptoms: once in a while  Sexually active: No  Hx STI: denies   Hx Abnormal pap: denies  Last pap: 2018- NILM    OB         Complaints: denies  Denies urinary frequency, hematuria, urinary hesitancy, urinary retention, urinary incontinence, and dysuria    BREAST  Complaints: denies  Denies: breast lump, breast tenderness, changed mole, dryness, nipple discharge, pruritus, rash, skin color change, and skin lesion(s)  Last mammogram: 2024- BIRADS 2  Personal hx: denies  Family hx: Cancer-related family history includes Breast cancer (age of onset: 55) in her cousin; Cancer in her father; Colon cancer (age of onset: 63) in her mother; Pancreatic cancer (age of onset: 57) in her  father; Thyroid cancer (age of onset: 53) in her father.  Patient does practice breast self awareness.    GENERAL  PMH reviewed/updated and is as below.   Patient does follow with a PCP.  Exercise: Does like to walk- as much as she can  Diet: well rounded  Denies domestic violence.    Past Medical History:   Diagnosis Date    Acid reflux     Allergic 2001    Seasonal    Basal cell carcinoma     basal cell on back and nose    Cancer (HCC)     Basel cell    Colon polyp     Dental bridge present     upper    Disease of thyroid gland     GERD (gastroesophageal reflux disease)     Hashimoto's disease     High cholesterol     HL (hearing loss)     Osteopenia     Seasonal allergies     mold,pollen    Thyroid disease     thyroid nodules    Wears glasses        Past Surgical History:   Procedure Laterality Date    COLONOSCOPY      ESOPHAGOGASTRODUODENOSCOPY      MOHS RECONSTRUCTION N/A 10/4/2016    Procedure: RECONSTRUCTION MOHS DEFECT NASAL TIP WITH FLAP;  Surgeon: Lino Clifford MD;  Location[facility];  Service:     ME EXC B9 LESION MRGN XCP SK TG T/A/L >4.0 CM N/A 3/19/2019    Procedure: MID UPPER BACK LIPOMA EXCISION;  Surgeon: Lino Clifford MD;  [facility];  Service: Plastics    ME REPAIR COMPLEX TRUNK 2.6-7.5 CM N/A 3/19/2019    Procedure: COMPLEX CLOSURE;  Surgeon: Lino Clifford MD;  [facility];  Service: Plastics    SKIN BIOPSY      SKIN CANCER EXCISION      basal cell CA on back and nose-2009 and 2016    TONSILLECTOMY      UPPER GASTROINTESTINAL ENDOSCOPY           Current Outpatient Medications:     ascorbic acid (VITAMIN C) 500 mg tablet, Take 500 mg by mouth daily., Disp: , Rfl:     atorvastatin (LIPITOR) 10 mg tablet, TAKE 1 TABLET BY MOUTH DAILY, Disp: 90 tablet, Rfl: 1    Biotin 70906 MCG TABS, Take by mouth, Disp: , Rfl:     Calcium Carbonate-Vit D-Min (CALTRATE 600+D PLUS MINERALS PO), Take by mouth daily., Disp: , Rfl:     levothyroxine 75 mcg tablet, Take 1 tablet  (75 mcg total) by mouth daily, Disp: 90 tablet, Rfl: 1    loratadine (CLARITIN) 10 mg tablet, Take 10 mg by mouth daily, Disp: , Rfl:     LORazepam (ATIVAN) 1 mg tablet, TAKE 1 TABLET BY MOUTH DAILY AT  BEDTIME, Disp: 30 tablet, Rfl: 1    mometasone (NASONEX) 50 mcg/act nasal spray, USE 2 SPRAYS IN BOTH NOSTRILS  DAILY, Disp: 51 g, Rfl: 1    Multiple Vitamins-Minerals (CENTRUM SILVER PO), Take by mouth daily., Disp: , Rfl:     pantoprazole (PROTONIX) 40 mg tablet, TAKE 1 TABLET BY MOUTH DAILY, Disp: 90 tablet, Rfl: 1    Sodium Fluoride 5000 Sensitive 1.1-5 % PSTE, , Disp: , Rfl:     Allergies   Allergen Reactions    Avelox [Moxifloxacin] Hives    Augmentin [Amoxicillin-Pot Clavulanate] Rash       Social History     Socioeconomic History    Marital status: /Civil Union     Spouse name: Not on file    Number of children: Not on file    Years of education: Not on file    Highest education level: Not on file   Occupational History    Not on file   Tobacco Use    Smoking status: Former     Current packs/day: 0.00     Average packs/day: 1 pack/day for 20.0 years (20.0 ttl pk-yrs)     Types: Cigarettes     Start date: 1965     Quit date: 1985     Years since quittin.3    Smokeless tobacco: Never    Tobacco comments:     quit 30 yrs ago   Vaping Use    Vaping status: Never Used   Substance and Sexual Activity    Alcohol use: Yes     Alcohol/week: 5.0 standard drinks of alcohol     Types: 5 Glasses of wine per week     Comment: 1 glass a night     Drug use: No    Sexual activity: Not Currently     Partners: Male   Other Topics Concern    Not on file   Social History Narrative    Daily coffee consumption; 1 cp/day    Daily cola consumption; ____ cp/day    Daily tea consumption; 1 cp/day    No domestic violence history    Uses seatbelts     Social Determinants of Health     Financial Resource Strain: Low Risk  (2023)    Overall Financial Resource Strain (CARDIA)     Difficulty of Paying Living  "Expenses: Not hard at all   Food Insecurity: No Food Insecurity (4/30/2024)    Hunger Vital Sign     Worried About Running Out of Food in the Last Year: Never true     Ran Out of Food in the Last Year: Never true   Transportation Needs: No Transportation Needs (4/30/2024)    PRAPARE - Transportation     Lack of Transportation (Medical): No     Lack of Transportation (Non-Medical): No   Physical Activity: Not on file   Stress: Not on file   Social Connections: Not on file   Intimate Partner Violence: Not on file   Housing Stability: Low Risk  (4/30/2024)    Housing Stability Vital Sign     Unable to Pay for Housing in the Last Year: No     Number of Times Moved in the Last Year: 1     Homeless in the Last Year: No     Review of Systems  Review of Systems  Per HPI    Objective  /82 (BP Location: Left arm, Patient Position: Sitting, Cuff Size: Standard)   Ht 5' 1.89\" (1.572 m)   Wt 71.2 kg (157 lb)   BMI 28.82 kg/m²      Physical Exam:  Physical Exam  Vitals reviewed.   Constitutional:       General: She is not in acute distress.     Appearance: Normal appearance. She is not ill-appearing or diaphoretic.   HENT:      Head: Normocephalic and atraumatic.   Cardiovascular:      Rate and Rhythm: Normal rate.   Pulmonary:      Effort: Pulmonary effort is normal. No respiratory distress.   Abdominal:      Palpations: Abdomen is soft.      Tenderness: There is no abdominal tenderness. There is no guarding or rebound.   Genitourinary:     Comments: Vulvar atrophy noted- on speculum exam there is atrophy of the vagina and cervix- no lesions, no bleeding or discharge  On bimanual exam no CMT, mildly enlarged and bulky uterus, no adnexal masses or fullness appreciated.    Musculoskeletal:      Right lower leg: No edema.      Left lower leg: No edema.   Skin:     General: Skin is warm and dry.   Neurological:      Mental Status: She is alert and oriented to person, place, and time.   Psychiatric:         Mood and Affect: " Mood normal.         Behavior: Behavior normal.       Breast inspection negative, no nipple discharge or bleeding, no masses or nodularity palpable  Rectal

## 2024-08-28 ENCOUNTER — IMMUNIZATIONS (OUTPATIENT)
Dept: FAMILY MEDICINE CLINIC | Facility: CLINIC | Age: 79
End: 2024-08-28

## 2024-08-28 DIAGNOSIS — Z23 IMMUNIZATION DUE: Primary | ICD-10-CM

## 2024-09-05 DIAGNOSIS — J30.9 CHRONIC ALLERGIC RHINITIS: ICD-10-CM

## 2024-09-06 RX ORDER — MOMETASONE FUROATE MONOHYDRATE 50 UG/1
SPRAY, METERED NASAL
Qty: 51 G | Refills: 1 | Status: SHIPPED | OUTPATIENT
Start: 2024-09-06

## 2024-09-11 ENCOUNTER — HOSPITAL ENCOUNTER (OUTPATIENT)
Dept: RADIOLOGY | Age: 79
Discharge: HOME | End: 2024-09-11
Payer: COMMERCIAL

## 2024-09-11 DIAGNOSIS — Z78.0 ASYMPTOMATIC POSTMENOPAUSAL STATE: ICD-10-CM

## 2024-09-11 DIAGNOSIS — M85.80 OSTEOPENIA, UNSPECIFIED LOCATION: ICD-10-CM

## 2024-09-11 PROCEDURE — 77080 DXA BONE DENSITY AXIAL: CPT

## 2024-09-18 DIAGNOSIS — K25.3 ACUTE GASTRIC ULCER WITHOUT HEMORRHAGE OR PERFORATION: ICD-10-CM

## 2024-09-18 DIAGNOSIS — E03.9 HYPOTHYROIDISM, UNSPECIFIED TYPE: ICD-10-CM

## 2024-09-18 DIAGNOSIS — F41.9 ANXIETY: ICD-10-CM

## 2024-09-18 RX ORDER — LEVOTHYROXINE SODIUM 75 UG/1
75 TABLET ORAL DAILY
Qty: 90 TABLET | Refills: 3 | Status: SHIPPED | OUTPATIENT
Start: 2024-09-18

## 2024-09-18 RX ORDER — PANTOPRAZOLE SODIUM 40 MG/1
40 TABLET, DELAYED RELEASE ORAL DAILY
Qty: 90 TABLET | Refills: 0 | Status: SHIPPED | OUTPATIENT
Start: 2024-09-18

## 2024-09-18 RX ORDER — LORAZEPAM 1 MG/1
TABLET ORAL
Qty: 30 TABLET | Refills: 0 | Status: SHIPPED | OUTPATIENT
Start: 2024-09-18

## 2024-09-18 NOTE — TELEPHONE ENCOUNTER
1 [id number] 08/20/2024 07/24/2024 LORazepam (Tablet) 30.0 30 1 MG YOKO LUCIANO, SHRUTHI [facility] Medicare 1 / 1 [place]    1 [id number] 07/25/2024 07/24/2024 LORazepam (Tablet) 30.0 30 1 MG YOKO LUCIANO, SHRUTHI [facility] Medicare 0 / 1 PA    1 [id number] 07/01/2024 05/24/2024 LORazepam (Tablet) 30.0 30 1 MG YOKO LUCIANO, SHRUTHI [facility] Medicare 1 / 1 PA    1 [id number] 05/25/2024 05/24/2024 LORazepam (Tablet) 30.0 30 1 MG YOKO LUCIANO, SHRUTHI [facility] Medicare 0 / 1 PA    1 [id number] 04/29/2024 04/03/2024 LORazepam (Tablet) 30.0 30 1 MG NEREIDA BALAJILATOYAYOKO, SHRUTHI [facility] Medicare 1 / 1 PA    1 [id number] 04/03/2024 04/03/2024 LORazepam (Tablet) 30.0 30 1 MG NEREIDA BOBYYOKO, SHRUTHI [facility] Medicare 0 / 1 PA    1 [id number] 03/05/2024 01/24/2024 LORazepam (Tablet) 30.0 30 1 MG NEREIDA YOKO LANDIS, SHRUTHI [facility] Medicare 1 / 1 PA    1 [id number] 01/25/2024 01/24/2024 LORazepam (Tablet) 30.0 30 1 MG NEREIDA BOBYYOKO, SHRUTHI [facility] Medicare 0 / 1 PA    1 [id number] 12/21/2023 12/19/2023 LORazepam (Tablet) 30.0 30 1 MG NEREIDA YOKO LANDIS, SHRUTHI [facility] Medicare 0 / 0 PA    1 [id number] 11/29/2023 10/11/2023 LORazepam (Tablet) 30.0 30 1 MG YOKO LUCIANO, SHRUTHI [facility] Medicare 1 / 1 PA

## 2024-10-08 ENCOUNTER — CLINICAL SUPPORT (OUTPATIENT)
Dept: FAMILY MEDICINE CLINIC | Facility: CLINIC | Age: 79
End: 2024-10-08
Payer: COMMERCIAL

## 2024-10-08 DIAGNOSIS — Z23 NEED FOR COVID-19 VACCINE: Primary | ICD-10-CM

## 2024-10-08 PROCEDURE — 91320 SARSCV2 VAC 30MCG TRS-SUC IM: CPT

## 2024-10-08 PROCEDURE — 90480 ADMN SARSCOV2 VAC 1/ONLY CMP: CPT

## 2024-10-15 DIAGNOSIS — F41.9 ANXIETY: ICD-10-CM

## 2024-10-15 NOTE — TELEPHONE ENCOUNTER
1 [id number] 09/19/2024 09/18/2024 LORazepam (Tablet) 30.0 30 1 MG YOKO LUCIANO, SHRUTHI [facility] Medicare 0 / 0 PA    1 [id number] 08/20/2024 07/24/2024 LORazepam (Tablet) 30.0 30 1 MG YOKO LUCIANO, SHRUTHI [facility] Medicare 1 / 1 PA    1 [id number] 07/25/2024 07/24/2024 LORazepam (Tablet) 30.0 30 1 MG NEREIDA CORREATAYLORYOKO KLEIN, SHRUTHI [facility] Medicare 0 / 1 PA    1 [id number] 07/01/2024 05/24/2024 LORazepam (Tablet) 30.0 30 1 MG YOKO LUCIANO, SHRUTHI [facility] Medicare 1 / 1 PA    1 [id number] 05/25/2024 05/24/2024 LORazepam (Tablet) 30.0 30 1 MG NEREIDA BALAJILATOYAYOKO, SHRUTHI [facility] Medicare 0 / 1 PA    1 [id number] 04/29/2024 04/03/2024 LORazepam (Tablet) 30.0 30 1 MG NEREIDA BOBYYOKO, SHRUTHI [facility] Medicare 1 / 1 PA    1 [id number] 04/03/2024 04/03/2024 LORazepam (Tablet) 30.0 30 1 MG NEREIDA BOBYYOKO, SHRUTHI [facility] Medicare 0 / 1 PA    1 [id number] 03/05/2024 01/24/2024 LORazepam (Tablet) 30.0 30 1 MG NEREIDA BOBYYOKO, SHRUTHI [facility] Medicare 1 / 1 PA    1 [id number] 01/25/2024 01/24/2024 LORazepam (Tablet) 30.0 30 1 MG NEREIDA BOBYYOKO, SHRUTHI [facility] Medicare 0 / 1 PA    1 [id number] 12/21/2023 12/19/2023 LORazepam (Tablet) 30.0 30 1 MG YOKO LUCIANO, SHRUTHI [facility] Medicare 0 / 0 PA

## 2024-10-16 RX ORDER — LORAZEPAM 1 MG/1
TABLET ORAL
Qty: 30 TABLET | Refills: 1 | Status: SHIPPED | OUTPATIENT
Start: 2024-10-16

## 2024-10-27 ENCOUNTER — RA CDI HCC (OUTPATIENT)
Dept: OTHER | Facility: HOSPITAL | Age: 79
End: 2024-10-27

## 2024-12-04 ENCOUNTER — OFFICE VISIT (OUTPATIENT)
Dept: FAMILY MEDICINE CLINIC | Facility: CLINIC | Age: 79
End: 2024-12-04

## 2024-12-04 VITALS
WEIGHT: 155.8 LBS | HEART RATE: 67 BPM | BODY MASS INDEX: 28.67 KG/M2 | HEIGHT: 62 IN | OXYGEN SATURATION: 98 % | DIASTOLIC BLOOD PRESSURE: 80 MMHG | SYSTOLIC BLOOD PRESSURE: 140 MMHG

## 2024-12-04 DIAGNOSIS — E03.9 HYPOTHYROIDISM, UNSPECIFIED TYPE: Primary | ICD-10-CM

## 2024-12-04 DIAGNOSIS — E78.2 MIXED HYPERLIPIDEMIA: ICD-10-CM

## 2024-12-04 DIAGNOSIS — J01.00 ACUTE NON-RECURRENT MAXILLARY SINUSITIS: ICD-10-CM

## 2024-12-04 RX ORDER — AMOXICILLIN 875 MG/1
875 TABLET, COATED ORAL 2 TIMES DAILY
Qty: 20 TABLET | Refills: 0 | Status: SHIPPED | OUTPATIENT
Start: 2024-12-04 | End: 2024-12-14

## 2024-12-04 NOTE — PROGRESS NOTES
"Name: Silvia Price      : 1945      MRN: [id number]  Encounter Provider: Clifton Murray MD  Encounter Date: 2024   Encounter department: [facility]    Assessment & Plan  Hypothyroidism, unspecified type  Appears to be stable clinically. Check TSH. Adjust meds if TSH is not at goal. Recheck 6m    Orders:    TSH, 3rd generation with Free T4 reflex; Future    Mixed hyperlipidemia  Duefor labs.  Urged diet compliance.  Recheck 6m  Orders:    Comprehensive metabolic panel; Future    Lipid panel; Future    TSH, 3rd generation with Free T4 reflex; Future    Acute non-recurrent maxillary sinusitis  I reviewed with pt. Start amox as directed. Recheck 1w if not improving - earlier if worse  Orders:    CBC and differential; Future    amoxicillin (AMOXIL) 875 mg tablet; Take 1 tablet (875 mg total) by mouth 2 (two) times a day for 10 days         History of Present Illness     f/u multiple med issues   - pt continues to be tired and \"stressed\" secondary to her 's multiple medical issues  - has persistent sinus congestion with occ frontal and maxillary pain as well as upper tooth pain.  Pt denies fever/chills.   - pt denies CP, palpitations, lightheadedness or other CV symptoms with or without exertion  - pt denies any new GI or  complaints.          Review of Systems   Constitutional:  Positive for fatigue (mild). Negative for activity change, appetite change, chills and fever.   HENT:  Positive for congestion, sinus pressure and sinus pain. Negative for ear discharge and ear pain.    Eyes: Negative.    Respiratory: Negative.     Cardiovascular: Negative.    Gastrointestinal: Negative.    Genitourinary: Negative.    Musculoskeletal:  Positive for arthralgias.   Neurological: Negative.    Psychiatric/Behavioral: Negative.       Past Medical History:   Diagnosis Date    Acid reflux     Allergic     Seasonal    Basal cell carcinoma     basal cell on back and nose    " Cancer (HCC)     Basel cell    Colon polyp     Dental bridge present     upper    Disease of thyroid gland     GERD (gastroesophageal reflux disease)     Hashimoto's disease     High cholesterol     HL (hearing loss)     Osteopenia     Seasonal allergies     mold,pollen    Thyroid disease     thyroid nodules    Wears glasses      Past Surgical History:   Procedure Laterality Date    COLONOSCOPY      ESOPHAGOGASTRODUODENOSCOPY      MOHS RECONSTRUCTION N/A 10/4/2016    Procedure: RECONSTRUCTION MOHS DEFECT NASAL TIP WITH FLAP;  Surgeon: Lino Clifford MD;  Location: [facility];  Service:     DE EXC B9 LESION MRGN XCP SK TG T/A/L >4.0 CM N/A 3/19/2019    Procedure: MID UPPER BACK LIPOMA EXCISION;  Surgeon: Lino Clifford MD;  [facility];  Service: Plastics    DE REPAIR COMPLEX TRUNK 2.6-7.5 CM N/A 3/19/2019    Procedure: COMPLEX CLOSURE;  Surgeon: Lino Clifford MD;  [facility];  Service: Plastics    SKIN BIOPSY      SKIN CANCER EXCISION      basal cell CA on back and nose- and     TONSILLECTOMY      UPPER GASTROINTESTINAL ENDOSCOPY       Family History   Problem Relation Age of Onset    Colon cancer Mother 63    Anxiety disorder Mother         Agoraphobia    Pancreatic cancer Father 57    Thyroid cancer Father 53    Cancer Father         Thyroid Pancreatic    Coronary artery disease Paternal Grandmother     Breast cancer Cousin 55    No Known Problems Maternal Aunt     No Known Problems Sister      Social History     Tobacco Use    Smoking status: Former     Current packs/day: 0.00     Average packs/day: 1 pack/day for 20.0 years (20.0 ttl pk-yrs)     Types: Cigarettes     Start date: 1965     Quit date: 1985     Years since quittin.7    Smokeless tobacco: Never    Tobacco comments:     quit 30 yrs ago   Vaping Use    Vaping status: Never Used   Substance and Sexual Activity    Alcohol use: Yes     Alcohol/week: 5.0 standard drinks of alcohol     Types: 5  "Glasses of wine per week     Comment: 1 glass a night     Drug use: No    Sexual activity: Not Currently     Partners: Male     Current Outpatient Medications on File Prior to Visit   Medication Sig    ascorbic acid (VITAMIN C) 500 mg tablet Take 500 mg by mouth daily.    atorvastatin (LIPITOR) 10 mg tablet TAKE 1 TABLET BY MOUTH DAILY    Biotin 93423 MCG TABS Take by mouth    Calcium Carbonate-Vit D-Min (CALTRATE 600+D PLUS MINERALS PO) Take by mouth daily.    levothyroxine 75 mcg tablet TAKE 1 TABLET BY MOUTH DAILY    loratadine (CLARITIN) 10 mg tablet Take 10 mg by mouth daily    LORazepam (ATIVAN) 1 mg tablet TAKE 1 TABLET BY MOUTH DAILY AT  BEDTIME    mometasone (NASONEX) 50 mcg/act nasal spray USE 2 SPRAYS IN BOTH NOSTRILS  DAILY    Multiple Vitamins-Minerals (CENTRUM SILVER PO) Take by mouth daily.    Sodium Fluoride 5000 Sensitive 1.1-5 % PSTE      Allergies   Allergen Reactions    Avelox [Moxifloxacin] Hives    Augmentin [Amoxicillin-Pot Clavulanate] Rash     Immunization History   Administered Date(s) Administered    COVID-19 PFIZER VACCINE 0.3 ML IM 02/17/2021, 03/08/2021, 10/02/2021    COVID-19 Pfizer Vac BIVALENT Saul-sucrose 12 Yr+ IM 09/29/2022    COVID-19 Pfizer mRNA vacc PF saul-sucrose 12 yr and older (Comirnaty) 10/28/2023, 10/08/2024    COVID-19 Pfizer vac (Saul-sucrose, gray cap) 12 yr+ IM 04/25/2022    INFLUENZA 10/15/2022    Influenza Split High Dose Preservative Free IM 10/16/2012, 10/03/2013, 10/14/2014, 09/22/2015, 09/14/2016, 08/30/2017, 08/28/2024    Influenza, high dose seasonal 0.7 mL 09/25/2018, 09/26/2019, 10/07/2020, 09/07/2021, 09/21/2023    Influenza, seasonal, injectable 09/01/2011    Pneumococcal Conjugate 13-Valent 12/01/2016    Pneumococcal Polysaccharide PPV23 11/06/2013    TD (adult) Preservative Free 01/23/2019    Tuberculin Skin Test-PPD Intradermal 04/20/2004     Objective   /80   Pulse 67   Ht 5' 1.89\" (1.572 m)   Wt 70.7 kg (155 lb 12.8 oz)   SpO2 98%   BMI " 28.60 kg/m²     Physical Exam  Vitals reviewed.   HENT:      Head: Normocephalic.      Right Ear: Tympanic membrane, ear canal and external ear normal.      Left Ear: Tympanic membrane, ear canal and external ear normal.      Nose: Congestion present.      Comments: Maxillary sinuses TTP     Mouth/Throat:      Mouth: Mucous membranes are moist.   Eyes:      Extraocular Movements: Extraocular movements intact.      Conjunctiva/sclera: Conjunctivae normal.      Pupils: Pupils are equal, round, and reactive to light.   Cardiovascular:      Rate and Rhythm: Normal rate and regular rhythm.      Pulses: Normal pulses.   Pulmonary:      Effort: Pulmonary effort is normal.      Breath sounds: No wheezing or rales.   Abdominal:      General: There is no distension.      Palpations: There is no mass.      Tenderness: There is no abdominal tenderness.   Musculoskeletal:         General: No swelling or tenderness.      Cervical back: No tenderness.      Right lower leg: No edema.      Left lower leg: No edema.   Lymphadenopathy:      Cervical: No cervical adenopathy.   Skin:     General: Skin is warm.      Capillary Refill: Capillary refill takes less than 2 seconds.   Neurological:      General: No focal deficit present.      Mental Status: She is alert and oriented to person, place, and time.

## 2024-12-05 DIAGNOSIS — K25.3 ACUTE GASTRIC ULCER WITHOUT HEMORRHAGE OR PERFORATION: ICD-10-CM

## 2024-12-05 RX ORDER — PANTOPRAZOLE SODIUM 40 MG/1
40 TABLET, DELAYED RELEASE ORAL DAILY
Qty: 90 TABLET | Refills: 3 | Status: SHIPPED | OUTPATIENT
Start: 2024-12-05

## 2024-12-07 NOTE — ASSESSMENT & PLAN NOTE
Duefor labs.  Urged diet compliance.  Recheck 6m  Orders:    Comprehensive metabolic panel; Future    Lipid panel; Future    TSH, 3rd generation with Free T4 reflex; Future

## 2024-12-09 ENCOUNTER — APPOINTMENT (OUTPATIENT)
Dept: LAB | Facility: AMBULARY SURGERY CENTER | Age: 79
End: 2024-12-09
Payer: COMMERCIAL

## 2024-12-09 DIAGNOSIS — J01.00 ACUTE NON-RECURRENT MAXILLARY SINUSITIS: ICD-10-CM

## 2024-12-09 DIAGNOSIS — E03.9 HYPOTHYROIDISM, UNSPECIFIED TYPE: ICD-10-CM

## 2024-12-09 DIAGNOSIS — E78.2 MIXED HYPERLIPIDEMIA: ICD-10-CM

## 2024-12-09 LAB
ALBUMIN SERPL BCG-MCNC: 4.2 G/DL (ref 3.5–5)
ALP SERPL-CCNC: 45 U/L (ref 34–104)
ALT SERPL W P-5'-P-CCNC: 12 U/L (ref 7–52)
ANION GAP SERPL CALCULATED.3IONS-SCNC: 4 MMOL/L (ref 4–13)
AST SERPL W P-5'-P-CCNC: 18 U/L (ref 13–39)
BASOPHILS # BLD AUTO: 0.01 THOUSANDS/ÂΜL (ref 0–0.1)
BASOPHILS NFR BLD AUTO: 0 % (ref 0–1)
BILIRUB SERPL-MCNC: 0.72 MG/DL (ref 0.2–1)
BUN SERPL-MCNC: 17 MG/DL (ref 5–25)
CALCIUM SERPL-MCNC: 10 MG/DL (ref 8.4–10.2)
CHLORIDE SERPL-SCNC: 107 MMOL/L (ref 96–108)
CHOLEST SERPL-MCNC: 203 MG/DL (ref ?–200)
CO2 SERPL-SCNC: 30 MMOL/L (ref 21–32)
CREAT SERPL-MCNC: 0.73 MG/DL (ref 0.6–1.3)
EOSINOPHIL # BLD AUTO: 0.14 THOUSAND/ÂΜL (ref 0–0.61)
EOSINOPHIL NFR BLD AUTO: 3 % (ref 0–6)
ERYTHROCYTE [DISTWIDTH] IN BLOOD BY AUTOMATED COUNT: 12.8 % (ref 11.6–15.1)
GFR SERPL CREATININE-BSD FRML MDRD: 79 ML/MIN/1.73SQ M
GLUCOSE P FAST SERPL-MCNC: 97 MG/DL (ref 65–99)
HCT VFR BLD AUTO: 43.3 % (ref 34.8–46.1)
HDLC SERPL-MCNC: 79 MG/DL
HGB BLD-MCNC: 13.9 G/DL (ref 11.5–15.4)
IMM GRANULOCYTES # BLD AUTO: 0.02 THOUSAND/UL (ref 0–0.2)
IMM GRANULOCYTES NFR BLD AUTO: 1 % (ref 0–2)
LDLC SERPL CALC-MCNC: 101 MG/DL (ref 0–100)
LYMPHOCYTES # BLD AUTO: 1.55 THOUSANDS/ÂΜL (ref 0.6–4.47)
LYMPHOCYTES NFR BLD AUTO: 36 % (ref 14–44)
MCH RBC QN AUTO: 30.2 PG (ref 26.8–34.3)
MCHC RBC AUTO-ENTMCNC: 32.1 G/DL (ref 31.4–37.4)
MCV RBC AUTO: 94 FL (ref 82–98)
MONOCYTES # BLD AUTO: 0.35 THOUSAND/ÂΜL (ref 0.17–1.22)
MONOCYTES NFR BLD AUTO: 8 % (ref 4–12)
NEUTROPHILS # BLD AUTO: 2.29 THOUSANDS/ÂΜL (ref 1.85–7.62)
NEUTS SEG NFR BLD AUTO: 52 % (ref 43–75)
NONHDLC SERPL-MCNC: 124 MG/DL
NRBC BLD AUTO-RTO: 0 /100 WBCS
PLATELET # BLD AUTO: 167 THOUSANDS/UL (ref 149–390)
PMV BLD AUTO: 12.6 FL (ref 8.9–12.7)
POTASSIUM SERPL-SCNC: 4.4 MMOL/L (ref 3.5–5.3)
PROT SERPL-MCNC: 7 G/DL (ref 6.4–8.4)
RBC # BLD AUTO: 4.61 MILLION/UL (ref 3.81–5.12)
SODIUM SERPL-SCNC: 141 MMOL/L (ref 135–147)
TRIGL SERPL-MCNC: 113 MG/DL (ref ?–150)
TSH SERPL DL<=0.05 MIU/L-ACNC: 2.79 UIU/ML (ref 0.45–4.5)
WBC # BLD AUTO: 4.36 THOUSAND/UL (ref 4.31–10.16)

## 2024-12-09 PROCEDURE — 80061 LIPID PANEL: CPT

## 2024-12-09 PROCEDURE — 36415 COLL VENOUS BLD VENIPUNCTURE: CPT

## 2024-12-09 PROCEDURE — 85025 COMPLETE CBC W/AUTO DIFF WBC: CPT

## 2024-12-09 PROCEDURE — 84443 ASSAY THYROID STIM HORMONE: CPT

## 2024-12-09 PROCEDURE — 80053 COMPREHEN METABOLIC PANEL: CPT

## 2024-12-10 ENCOUNTER — RESULTS FOLLOW-UP (OUTPATIENT)
Dept: FAMILY MEDICINE CLINIC | Facility: CLINIC | Age: 79
End: 2024-12-10

## 2025-01-02 DIAGNOSIS — J30.9 CHRONIC ALLERGIC RHINITIS: ICD-10-CM

## 2025-01-02 DIAGNOSIS — E78.2 MIXED HYPERLIPIDEMIA: ICD-10-CM

## 2025-01-02 DIAGNOSIS — F41.9 ANXIETY: ICD-10-CM

## 2025-01-02 RX ORDER — ATORVASTATIN CALCIUM 10 MG/1
10 TABLET, FILM COATED ORAL DAILY
Qty: 90 TABLET | Refills: 3 | Status: SHIPPED | OUTPATIENT
Start: 2025-01-02

## 2025-01-03 RX ORDER — LORAZEPAM 1 MG/1
1 TABLET ORAL
Qty: 30 TABLET | Refills: 1 | Status: SHIPPED | OUTPATIENT
Start: 2025-01-03

## 2025-01-03 RX ORDER — MOMETASONE FUROATE MONOHYDRATE 50 UG/1
SPRAY, METERED NASAL
Qty: 51 G | Refills: 1 | Status: SHIPPED | OUTPATIENT
Start: 2025-01-03

## 2025-01-03 NOTE — TELEPHONE ENCOUNTER
1 [id number] 11/21/2024 10/16/2024 LORazepam (Tablet) 30.0 30 1 MG YOKO LUCIANO, SHRUTHI [facility] Medicare 1 / 1 PA    1 [id number] 10/17/2024 10/16/2024 LORazepam (Tablet) 30.0 30 1 MG YOKO LUCIANO, SHRUTHI [facility] Medicare 0 / 1 PA    1 [id number] 09/19/2024 09/18/2024 LORazepam (Tablet) 30.0 30 1 MG YOKO LUCIANO POGODZINSKI [facility] Medicare 0 / 0 PA    1 [id number] 08/20/2024 07/24/2024 LORazepam (Tablet) 30.0 30 1 MG YOKO LUCIANO, SHRUTHI [facility] Medicare 1 / 1 PA    1 [id number] 07/25/2024 07/24/2024 LORazepam (Tablet) 30.0 30 1 MG YOKO LUCIANO POGODZINSKI [facility] Medicare 0 / 1 PA    1 [id number] 07/01/2024 05/24/2024 LORazepam (Tablet) 30.0 30 1 MG YOKO LUCIANO, SHRUTHI [facility] Medicare 1 / 1 PA    1 [id number] 05/25/2024 05/24/2024 LORazepam (Tablet) 30.0 30 1 MG YOKO LUCIANO POGODZINSKI [facility] Medicare 0 / 1 PA    1 [id number] 04/29/2024 04/03/2024 LORazepam (Tablet) 30.0 30 1 MG YOKO LUICANO POGODZINSKI [facility] Medicare 1 / 1 PA    1 [id number] 04/03/2024 04/03/2024 LORazepam (Tablet) 30.0 30 1 MG YOKO LUCIANO POGODZINSKI [facility] Medicare 0 / 1 PA    1 [id number] 03/05/2024 01/24/2024 LORazepam (Tablet) 30.0 30 1 MG YOKO LUCIANO POGODZINSKI [facility] Medicare 1 / 1 PA

## 2025-01-16 ENCOUNTER — HOSPITAL ENCOUNTER (OUTPATIENT)
Dept: RADIOLOGY | Age: 80
Discharge: HOME | End: 2025-01-16
Payer: COMMERCIAL

## 2025-01-16 DIAGNOSIS — Z12.31 ENCOUNTER FOR SCREENING MAMMOGRAM FOR MALIGNANT NEOPLASM OF BREAST: ICD-10-CM

## 2025-01-16 PROCEDURE — 77067 SCR MAMMO BI INCL CAD: CPT

## 2025-01-16 PROCEDURE — 77063 BREAST TOMOSYNTHESIS BI: CPT

## 2025-01-27 ENCOUNTER — RESULTS FOLLOW-UP (OUTPATIENT)
Dept: OBGYN CLINIC | Facility: CLINIC | Age: 80
End: 2025-01-27

## 2025-01-27 DIAGNOSIS — Z12.31 ENCOUNTER FOR SCREENING MAMMOGRAM FOR MALIGNANT NEOPLASM OF BREAST: Primary | ICD-10-CM

## 2025-01-31 ENCOUNTER — TELEPHONE (OUTPATIENT)
Age: 80
End: 2025-01-31

## 2025-01-31 DIAGNOSIS — J01.00 ACUTE NON-RECURRENT MAXILLARY SINUSITIS: Primary | ICD-10-CM

## 2025-01-31 RX ORDER — AMOXICILLIN 875 MG/1
875 TABLET, COATED ORAL 2 TIMES DAILY
Qty: 14 TABLET | Refills: 0 | Status: SHIPPED | OUTPATIENT
Start: 2025-01-31 | End: 2025-02-07

## 2025-01-31 NOTE — TELEPHONE ENCOUNTER
Patient called in about having same symptoms as last month with sinus pressure in head and when blowing nose there is blood in mucus. No appointment available. Patient would like to have amoxicillin called into [facility] pharmacy. Please advise. Thank you.

## 2025-02-04 ENCOUNTER — OFFICE VISIT (OUTPATIENT)
Dept: GASTROENTEROLOGY | Facility: AMBULARY SURGERY CENTER | Age: 80
End: 2025-02-04
Payer: COMMERCIAL

## 2025-02-04 VITALS
SYSTOLIC BLOOD PRESSURE: 144 MMHG | BODY MASS INDEX: 28.6 KG/M2 | HEIGHT: 62 IN | WEIGHT: 155.4 LBS | DIASTOLIC BLOOD PRESSURE: 92 MMHG | HEART RATE: 88 BPM | OXYGEN SATURATION: 98 %

## 2025-02-04 DIAGNOSIS — K21.9 GASTROESOPHAGEAL REFLUX DISEASE WITHOUT ESOPHAGITIS: Primary | ICD-10-CM

## 2025-02-04 DIAGNOSIS — Z86.0100 HISTORY OF COLON POLYPS: ICD-10-CM

## 2025-02-04 PROCEDURE — 99214 OFFICE O/P EST MOD 30 MIN: CPT | Performed by: INTERNAL MEDICINE

## 2025-02-04 RX ORDER — FAMOTIDINE 20 MG/1
20 TABLET, FILM COATED ORAL 2 TIMES DAILY
Qty: 180 TABLET | Refills: 3 | Status: SHIPPED | OUTPATIENT
Start: 2025-02-04

## 2025-02-04 NOTE — ASSESSMENT & PLAN NOTE
Patient with history of GERD and has been doing well on pantoprazole.    -Patient was explained about the lifestyle and dietary modifications.  Advised to avoid fatty foods, chocolates, caffeine, alcohol and any other triggering foods.  Avoid eating for at least 3 hours before going to bed.    -Discussed about long-term side effects from pantoprazole and advised her to try Pepcid instead.  Prescription was sent to the pharmacy.    -Schedule EGD    Orders:    famotidine (PEPCID) 20 mg tablet; Take 1 tablet (20 mg total) by mouth 2 (two) times a day

## 2025-02-04 NOTE — ASSESSMENT & PLAN NOTE
Patient with history of colon polyps and had last colonoscopy in March 2022.    -Due for next colonoscopy in couple of years.  Advised patient to call at that time and will consider depending on her medical condition.

## 2025-02-04 NOTE — PROGRESS NOTES
Name: Silvia Price      : 1945      MRN: [id number]  Encounter Provider: Ramiro Pino MD  Encounter Date: 2025   Encounter department: [facility]  :  Assessment & Plan  Gastroesophageal reflux disease without esophagitis    Patient with history of GERD and has been doing well on pantoprazole.    -Patient was explained about the lifestyle and dietary modifications.  Advised to avoid fatty foods, chocolates, caffeine, alcohol and any other triggering foods.  Avoid eating for at least 3 hours before going to bed.    -Discussed about long-term side effects from pantoprazole and advised her to try Pepcid instead.  Prescription was sent to the pharmacy.    -Schedule EGD    Orders:    famotidine (PEPCID) 20 mg tablet; Take 1 tablet (20 mg total) by mouth 2 (two) times a day    History of colon polyps    Patient with history of colon polyps and had last colonoscopy in 2022.    -Due for next colonoscopy in couple of years.  Advised patient to call at that time and will consider depending on her medical condition.             History of Present Illness   Medication Refill  Associated symptoms include headaches. Pertinent negatives include no arthralgias, chest pain, chills, coughing, diaphoresis, fatigue, fever, joint swelling, myalgias, numbness, rash or sore throat.     Silvia Price is a 79 y.o. female who presents for regular follow-up.  Patient has history of GERD for which she takes pantoprazole regularly.  Denies any issues with swallowing.  Good appetite, no recent weight loss.  Regular bowel movements and denies any blood or mucus in the stool.  No abdominal pain, nausea or vomiting.    Last EGD and colonoscopy in 2022    Chaperon: MAGDALENA Ye      Review of Systems   Constitutional:  Negative for activity change, appetite change, chills, diaphoresis, fatigue, fever and unexpected weight change.   HENT:  Negative for ear discharge, ear pain,  "facial swelling, hearing loss, nosebleeds, sore throat, tinnitus and voice change.    Eyes:  Negative for pain, discharge, redness, itching and visual disturbance.   Respiratory:  Negative for apnea, cough, chest tightness, shortness of breath and wheezing.    Cardiovascular:  Negative for chest pain and palpitations.   Gastrointestinal:         As noted in HPI   Endocrine: Negative for cold intolerance, heat intolerance and polyuria.   Genitourinary:  Negative for difficulty urinating, dysuria, flank pain, hematuria and urgency.   Musculoskeletal:  Negative for arthralgias, back pain, gait problem, joint swelling and myalgias.   Skin:  Negative for rash and wound.   Neurological:  Positive for headaches. Negative for dizziness, tremors, seizures, speech difficulty, light-headedness and numbness.   Hematological:  Negative for adenopathy. Does not bruise/bleed easily.   Psychiatric/Behavioral:  Negative for agitation, behavioral problems and confusion. The patient is not nervous/anxious.           Objective   /92 (BP Location: Left arm, Patient Position: Sitting, Cuff Size: Standard)   Pulse 88   Ht 5' 1.89\" (1.572 m)   Wt 70.5 kg (155 lb 6.4 oz)   SpO2 98%   BMI 28.52 kg/m²      Physical Exam  Constitutional:       Appearance: Normal appearance. She is well-developed.   HENT:      Head: Normocephalic and atraumatic.      Nose: Nose normal.   Eyes:      Conjunctiva/sclera: Conjunctivae normal.   Neck:      Thyroid: No thyromegaly.      Vascular: No JVD.      Trachea: No tracheal deviation.   Cardiovascular:      Rate and Rhythm: Normal rate and regular rhythm.      Heart sounds: Normal heart sounds. No murmur heard.     No friction rub. No gallop.   Pulmonary:      Effort: Pulmonary effort is normal. No respiratory distress.      Breath sounds: Normal breath sounds. No wheezing or rales.   Abdominal:      General: Bowel sounds are normal. There is no distension.      Palpations: Abdomen is soft. There is " no mass.      Tenderness: There is no abdominal tenderness. There is no guarding.      Hernia: No hernia is present.   Musculoskeletal:         General: No tenderness or deformity.      Cervical back: Neck supple.      Right lower leg: No edema.      Left lower leg: No edema.   Lymphadenopathy:      Cervical: No cervical adenopathy.   Skin:     General: Skin is warm and dry.      Findings: No erythema or rash.   Neurological:      Mental Status: She is alert and oriented to person, place, and time.   Psychiatric:         Mood and Affect: Mood normal.         Behavior: Behavior normal.         Thought Content: Thought content normal.

## 2025-03-08 DIAGNOSIS — F41.9 ANXIETY: ICD-10-CM

## 2025-03-10 RX ORDER — LORAZEPAM 1 MG/1
1 TABLET ORAL
Qty: 30 TABLET | Refills: 1 | Status: SHIPPED | OUTPATIENT
Start: 2025-03-10

## 2025-03-10 NOTE — TELEPHONE ENCOUNTER
1 [id number] 02/10/2025 01/03/2025 LORazepam (Tablet) 30.0 30 1 MG YOKO LUCIANO, SHRUTHI [facility] Medicare 1 / 1 [place]    1 [id number] 01/06/2025 01/03/2025 LORazepam (Tablet) 30.0 30 1 MG YOKO LUCIANO, SHRUTHI [facility] Medicare 0 / 1 PA    1 [id number] 11/21/2024 10/16/2024 LORazepam (Tablet) 30.0 30 1 MG YOKO LUCIANO, SHRUTHI [facility] Medicare 1 / 1 PA    1 [id number] 10/17/2024 10/16/2024 LORazepam (Tablet) 30.0 30 1 MG YOKO LUCIANO, SHRUTHI [facility] Medicare 0 / 1 PA    1 [id number] 09/19/2024 09/18/2024 LORazepam (Tablet) 30.0 30 1 MG YOKO LUCIANO POGODZINSKI [facility] Medicare 0 / 0 PA    1 [id number] 08/20/2024 07/24/2024 LORazepam (Tablet) 30.0 30 1 MG YOKO LUCIANO, SHRUTHI [facility] Medicare 1 / 1 PA    1 [id number] 07/25/2024 07/24/2024 LORazepam (Tablet) 30.0 30 1 MG YOKO LUCIANO POGODZINSKI [facility] Medicare 0 / 1 PA    1 [id number] 07/01/2024 05/24/2024 LORazepam (Tablet) 30.0 30 1 MG YOKO LUCIANO, SHRUTHI [facility] Medicare 1 / 1 PA    1 [id number] 05/25/2024 05/24/2024 LORazepam (Tablet) 30.0 30 1 MG YOKO LUCIANO POGODZINSKI [facility] Medicare 0 / 1 PA    1 [id number] 04/29/2024 04/03/2024 LORazepam (Tablet) 30.0 30 1 MG YOKO LUCIANO POGODZINSKI [facility] Medicare 1 / 1 PA

## 2025-04-25 ENCOUNTER — RA CDI HCC (OUTPATIENT)
Dept: OTHER | Facility: HOSPITAL | Age: 80
End: 2025-04-25

## 2025-05-02 ENCOUNTER — OFFICE VISIT (OUTPATIENT)
Dept: FAMILY MEDICINE CLINIC | Facility: CLINIC | Age: 80
End: 2025-05-02
Payer: COMMERCIAL

## 2025-05-02 VITALS
WEIGHT: 155 LBS | BODY MASS INDEX: 28.52 KG/M2 | OXYGEN SATURATION: 98 % | DIASTOLIC BLOOD PRESSURE: 70 MMHG | TEMPERATURE: 97.1 F | SYSTOLIC BLOOD PRESSURE: 110 MMHG | HEART RATE: 84 BPM | HEIGHT: 62 IN

## 2025-05-02 DIAGNOSIS — E06.3 HYPOTHYROIDISM DUE TO HASHIMOTO THYROIDITIS: ICD-10-CM

## 2025-05-02 DIAGNOSIS — F41.9 ANXIETY: ICD-10-CM

## 2025-05-02 DIAGNOSIS — E78.2 MIXED HYPERLIPIDEMIA: ICD-10-CM

## 2025-05-02 DIAGNOSIS — Z00.00 MEDICARE ANNUAL WELLNESS VISIT, SUBSEQUENT: Primary | ICD-10-CM

## 2025-05-02 DIAGNOSIS — K21.9 GASTROESOPHAGEAL REFLUX DISEASE WITHOUT ESOPHAGITIS: ICD-10-CM

## 2025-05-02 PROCEDURE — G2211 COMPLEX E/M VISIT ADD ON: HCPCS | Performed by: FAMILY MEDICINE

## 2025-05-02 PROCEDURE — 99214 OFFICE O/P EST MOD 30 MIN: CPT | Performed by: FAMILY MEDICINE

## 2025-05-02 PROCEDURE — G0439 PPPS, SUBSEQ VISIT: HCPCS | Performed by: FAMILY MEDICINE

## 2025-05-02 RX ORDER — LORAZEPAM 1 MG/1
1 TABLET ORAL
Qty: 30 TABLET | Refills: 1 | Status: SHIPPED | OUTPATIENT
Start: 2025-05-02

## 2025-05-02 NOTE — ASSESSMENT & PLAN NOTE
I reviewed with pt.  Check labs. Continue Pepcid. Monitor diet.  Recheck 6m  Orders:  •  CBC and differential; Future  •  Comprehensive metabolic panel; Future

## 2025-05-02 NOTE — TELEPHONE ENCOUNTER
1 [id number] 04/07/2025 04/07/2025 03/10/2025 LORazepam (Tablet) 30.0 30 1 MG YOKO LUCIANO POGODZINSKI [facility] Medicare 1 / 1 PA    1 [id number] 03/10/2025 03/10/2025 03/10/2025 LORazepam (Tablet) 30.0 30 1 MG YOKO LUCIANO POGODZINSKI [facility] Medicare 0 / 1 PA    1 [id number] 02/10/2025 02/10/2025 01/03/2025 LORazepam (Tablet) 30.0 30 1 MG YOKO LUCIANO POGODZINSKI [facility] Medicare 1 / 1 PA    1 [id number] 01/06/2025 01/06/2025 01/03/2025 LORazepam (Tablet) 30.0 30 1 MG YOKO LUCIANO POGODZINSKI [facility] Medicare 0 / 1 PA    1 [id number] 11/21/2024 11/21/2024 10/16/2024 LORazepam (Tablet) 30.0 30 1 MG YOKO LUCIANO POGODZINSKI [facility] Medicare 1 / 1 PA    1 [id number] 10/17/2024 10/17/2024 10/16/2024 LORazepam (Tablet) 30.0 30 1 MG YOKO LUCIANO POGODZINSKI [facility] Medicare 0 / 1 PA    1 [id number] 09/19/2024 09/19/2024 09/18/2024 LORazepam (Tablet) 30.0 30 1 MG YOKO LUCIANO POGODZINSKI [facility] Medicare 0 / 0 PA    1 [id number] 08/20/2024 08/20/2024 07/24/2024 LORazepam (Tablet) 30.0 30 1 MG YOKO LUCIANO POGODZINSKI [facility] Medicare 1 / 1 PA    1 [id number] 07/25/2024 07/25/2024 07/24/2024 LORazepam (Tablet) 30.0 30 1 MG YOKO LUCIANO POGODZINSKI [facility] Medicare 0 / 1 PA    1 [id number] 07/01/2024 07/01/2024 05/24/2024 LORazepam (Tablet) 30.0 30 1 MG YOKO LUCIANO POGODZINSKI [facility] Medicare 1 / 1 PA

## 2025-05-02 NOTE — PROGRESS NOTES
Name: Silvia Price      : 1945      MRN: [id number]  Encounter Provider: Clifton Murray MD  Encounter Date: 2025   Encounter department: [facility]  :  Assessment & Plan  Medicare annual wellness visit, subsequent         Hypothyroidism due to Hashimoto thyroiditis  Appears to be stable clinically. Check TSH. Adjust meds if TSH is not at goal. Recheck 6m  Orders:  •  TSH, 3rd generation with Free T4 reflex; Future    Mixed hyperlipidemia  I reviewed with pt. Check labs.  Continue atorvastatin.  Monitor diet.  Recheck 6m  Orders:  •  Comprehensive metabolic panel; Future  •  Lipid panel; Future    Gastroesophageal reflux disease without esophagitis  I reviewed with pt.  Check labs. Continue Pepcid. Monitor diet.  Recheck 6m  Orders:  •  CBC and differential; Future  •  Comprehensive metabolic panel; Future       Preventive health issues were discussed with patient, and age appropriate screening tests were ordered as noted in patient's After Visit Summary. Personalized health advice and appropriate referrals for health education or preventive services given if needed, as noted in patient's After Visit Summary.    History of Present Illness     f/u multiple med issues and AWV  - pt doing well  - pt having increased allergy symptoms over the last few weeks.  Patient has used Zyrtec in the past but it does not seem to be helping as much at present.  No cough or SOB  - pt walks occasionally for exercise.  Pt denies CP, palpitations, lightheadedness or other CV symptoms with or without exertion  - pt denies any new GI or  complaints.    - AWV done         Patient Care Team:  Clifton Murray MD as PCP - General  Tiffanie Samaniego MD    Review of Systems   Constitutional: Negative.    HENT:  Positive for congestion. Negative for ear discharge, ear pain, sinus pressure, sinus pain and sore throat.    Eyes: Negative.    Respiratory: Negative.     Cardiovascular:  Negative.    Gastrointestinal: Negative.    Endocrine: Negative.    Genitourinary: Negative.    Musculoskeletal: Negative.    Skin: Negative.    Allergic/Immunologic: Negative.    Neurological: Negative.    Hematological: Negative.    Psychiatric/Behavioral: Negative.       Medical History Reviewed by provider this encounter:  Tobacco  Allergies  Meds  Problems  Med Hx  Surg Hx  Fam Hx       Annual Wellness Visit Questionnaire   Silvia is here for her Subsequent Wellness visit. Last Medicare Wellness visit information reviewed, patient interviewed and updates made to the record today.      Health Risk Assessment:   Patient rates overall health as very good. Patient feels that their physical health rating is same. Patient is satisfied with their life. Eyesight was rated as slightly worse. Hearing was rated as slightly worse. Patient feels that their emotional and mental health rating is same. Patients states they are sometimes angry. Patient states they are sometimes unusually tired/fatigued. Pain experienced in the last 7 days has been none. Patient states that she has experienced no weight loss or gain in last 6 months.     Depression Screening:   PHQ-2 Score: 1      Fall Risk Screening:   In the past year, patient has experienced: no history of falling in past year      Urinary Incontinence Screening:   Patient has not leaked urine accidently in the last six months.     Home Safety:  Patient does not have trouble with stairs inside or outside of their home. Patient has working smoke alarms and has working carbon monoxide detector. Home safety hazards include: none.     Nutrition:   Current diet is Regular.     Medications:   Patient is currently taking over-the-counter supplements. OTC medications include: see medication list. Patient is able to manage medications.     Activities of Daily Living (ADLs)/Instrumental Activities of Daily Living (IADLs):   Walk and transfer into and out of bed and chair?:  Yes  Dress and groom yourself?: Yes    Bathe or shower yourself?: Yes    Feed yourself? Yes  Do your laundry/housekeeping?: Yes  Manage your money, pay your bills and track your expenses?: Yes  Make your own meals?: Yes    Do your own shopping?: Yes    Previous Hospitalizations:   Any hospitalizations or ED visits within the last 12 months?: No      Advance Care Planning:   Living will: Yes    Durable POA for healthcare: No    Advanced directive: Yes    Advanced directive counseling given: Yes      Preventive Screenings      Cardiovascular Screening:    General: Screening Not Indicated and History Lipid Disorder      Diabetes Screening:     General: Screening Current      Colorectal Cancer Screening:     General: Screening Current      Breast Cancer Screening:     General: Screening Current      Cervical Cancer Screening:    General: Screening Not Indicated      Osteoporosis Screening:    General: Screening Current      Lung Cancer Screening:     General: Screening Not Indicated      Hepatitis C Screening:    General: Screening Current    Immunizations:  - Immunizations due: Zoster (Shingrix)    Screening, Brief Intervention, and Referral to Treatment (SBIRT)     Screening  Typical number of drinks in a day: 1  Typical number of drinks in a week: 10  Interpretation: Low risk drinking behavior.    AUDIT-C Screenin) How often did you have a drink containing alcohol in the past year? 4 or more times a week  2) How many drinks did you have on a typical day when you were drinking in the past year? 1 to 2  3) How often did you have 6 or more drinks on one occasion in the past year? never    AUDIT-C Score: 4  Interpretation: Score 3-12 (female): POSITIVE screen for alcohol misuse    AUDIT Screenin) How often during the last year have you found that you were not able to stop drinking once you had started? 0 - never  5) How often during the last year have you failed to do what was normally expected from you because  "of drinking? 0 - never  6) How often during the last year have you needed a first drink in the morning to get yourself going after a heavy drinking session? 0 - never  7) How often during the last year have you had a feeling of guilt or remorse after drinking? 0 - never  8) How often during the last year have you been unable to remember what happened the night before because you had been drinking? 0 - never  9) Have you or someone else been injured as a result of your drinking? 0 - no  10) Has a relative or friend or a doctor or another health worker been concerned about your drinking or suggested you cut down? 0 - no    AUDIT Score: 4  Interpretation: Low risk alcohol consumption    Single Item Drug Screening:  How often have you used an illegal drug (including marijuana) or a prescription medication for non-medical reasons in the past year? never    Single Item Drug Screen Score: 0  Interpretation: Negative screen for possible drug use disorder    Social Drivers of Health     Financial Resource Strain: Low Risk  (4/13/2023)    Overall Financial Resource Strain (CARDIA)    • Difficulty of Paying Living Expenses: Not hard at all   Food Insecurity: No Food Insecurity (4/27/2025)    Hunger Vital Sign    • Worried About Running Out of Food in the Last Year: Never true    • Ran Out of Food in the Last Year: Never true   Transportation Needs: No Transportation Needs (4/27/2025)    PRAPARE - Transportation    • Lack of Transportation (Medical): No    • Lack of Transportation (Non-Medical): No   Housing Stability: Low Risk  (4/27/2025)    Housing Stability Vital Sign    • Unable to Pay for Housing in the Last Year: No    • Number of Times Moved in the Last Year: 0    • Homeless in the Last Year: No   Utilities: Not At Risk (4/27/2025)    [facility] Utilities    • Threatened with loss of utilities: No     No results found.    Objective   /70   Pulse 84   Temp (!) 97.1 °F (36.2 °C)   Ht 5' 1.9\" (1.572 m)   Wt 70.3 kg (155 " lb)   SpO2 98%   BMI 28.44 kg/m²     Physical Exam  Vitals reviewed.   Constitutional:       Appearance: Normal appearance.   HENT:      Head: Normocephalic.      Right Ear: Tympanic membrane, ear canal and external ear normal.      Left Ear: Tympanic membrane, ear canal and external ear normal.      Nose: Nose normal.      Mouth/Throat:      Mouth: Mucous membranes are moist.   Eyes:      Extraocular Movements: Extraocular movements intact.      Conjunctiva/sclera: Conjunctivae normal.      Pupils: Pupils are equal, round, and reactive to light.   Cardiovascular:      Rate and Rhythm: Normal rate and regular rhythm.      Pulses: Normal pulses.   Pulmonary:      Effort: Pulmonary effort is normal.      Breath sounds: Normal breath sounds.   Abdominal:      General: There is no distension.      Palpations: There is no mass.      Tenderness: There is no abdominal tenderness.   Musculoskeletal:         General: No swelling, tenderness or deformity.      Cervical back: No tenderness.      Right lower leg: No edema.      Left lower leg: No edema.   Lymphadenopathy:      Cervical: No cervical adenopathy.   Skin:     General: Skin is warm.      Capillary Refill: Capillary refill takes less than 2 seconds.   Neurological:      General: No focal deficit present.      Mental Status: She is alert and oriented to person, place, and time.   Psychiatric:         Mood and Affect: Mood normal.      Comments: PHQ-2/9 Depression Screening    Little interest or pleasure in doing things: 0 - not at all  Feeling down, depressed, or hopeless: 1 - several days  PHQ-2 Score: 1  PHQ-2 Interpretation: Negative depression screen

## 2025-05-02 NOTE — ASSESSMENT & PLAN NOTE
Appears to be stable clinically. Check TSH. Adjust meds if TSH is not at goal. Recheck 6m  Orders:  •  TSH, 3rd generation with Free T4 reflex; Future

## 2025-05-02 NOTE — ASSESSMENT & PLAN NOTE
I reviewed with pt. Check labs.  Continue atorvastatin.  Monitor diet.  Recheck 6m  Orders:  •  Comprehensive metabolic panel; Future  •  Lipid panel; Future

## 2025-05-13 ENCOUNTER — APPOINTMENT (OUTPATIENT)
Dept: LAB | Facility: AMBULARY SURGERY CENTER | Age: 80
End: 2025-05-13
Payer: COMMERCIAL

## 2025-05-13 DIAGNOSIS — E06.3 HYPOTHYROIDISM DUE TO HASHIMOTO THYROIDITIS: ICD-10-CM

## 2025-05-13 DIAGNOSIS — E78.2 MIXED HYPERLIPIDEMIA: ICD-10-CM

## 2025-05-13 DIAGNOSIS — K21.9 GASTROESOPHAGEAL REFLUX DISEASE WITHOUT ESOPHAGITIS: ICD-10-CM

## 2025-05-13 LAB
ALBUMIN SERPL BCG-MCNC: 4.2 G/DL (ref 3.5–5)
ALP SERPL-CCNC: 53 U/L (ref 34–104)
ALT SERPL W P-5'-P-CCNC: 14 U/L (ref 7–52)
ANION GAP SERPL CALCULATED.3IONS-SCNC: 9 MMOL/L (ref 4–13)
AST SERPL W P-5'-P-CCNC: 18 U/L (ref 13–39)
BASOPHILS # BLD AUTO: 0.02 THOUSANDS/ÂΜL (ref 0–0.1)
BASOPHILS NFR BLD AUTO: 1 % (ref 0–1)
BILIRUB SERPL-MCNC: 0.91 MG/DL (ref 0.2–1)
BUN SERPL-MCNC: 20 MG/DL (ref 5–25)
CALCIUM SERPL-MCNC: 10.5 MG/DL (ref 8.4–10.2)
CHLORIDE SERPL-SCNC: 103 MMOL/L (ref 96–108)
CHOLEST SERPL-MCNC: 213 MG/DL (ref ?–200)
CO2 SERPL-SCNC: 28 MMOL/L (ref 21–32)
CREAT SERPL-MCNC: 0.73 MG/DL (ref 0.6–1.3)
EOSINOPHIL # BLD AUTO: 0.09 THOUSAND/ÂΜL (ref 0–0.61)
EOSINOPHIL NFR BLD AUTO: 2 % (ref 0–6)
ERYTHROCYTE [DISTWIDTH] IN BLOOD BY AUTOMATED COUNT: 13.1 % (ref 11.6–15.1)
GFR SERPL CREATININE-BSD FRML MDRD: 79 ML/MIN/1.73SQ M
GLUCOSE P FAST SERPL-MCNC: 102 MG/DL (ref 65–99)
HCT VFR BLD AUTO: 41.8 % (ref 34.8–46.1)
HDLC SERPL-MCNC: 88 MG/DL
HGB BLD-MCNC: 13.4 G/DL (ref 11.5–15.4)
IMM GRANULOCYTES # BLD AUTO: 0.01 THOUSAND/UL (ref 0–0.2)
IMM GRANULOCYTES NFR BLD AUTO: 0 % (ref 0–2)
LDLC SERPL CALC-MCNC: 106 MG/DL (ref 0–100)
LYMPHOCYTES # BLD AUTO: 1.48 THOUSANDS/ÂΜL (ref 0.6–4.47)
LYMPHOCYTES NFR BLD AUTO: 35 % (ref 14–44)
MCH RBC QN AUTO: 30.9 PG (ref 26.8–34.3)
MCHC RBC AUTO-ENTMCNC: 32.1 G/DL (ref 31.4–37.4)
MCV RBC AUTO: 97 FL (ref 82–98)
MONOCYTES # BLD AUTO: 0.38 THOUSAND/ÂΜL (ref 0.17–1.22)
MONOCYTES NFR BLD AUTO: 9 % (ref 4–12)
NEUTROPHILS # BLD AUTO: 2.22 THOUSANDS/ÂΜL (ref 1.85–7.62)
NEUTS SEG NFR BLD AUTO: 53 % (ref 43–75)
NONHDLC SERPL-MCNC: 125 MG/DL
NRBC BLD AUTO-RTO: 0 /100 WBCS
PLATELET # BLD AUTO: 154 THOUSANDS/UL (ref 149–390)
PMV BLD AUTO: 11.9 FL (ref 8.9–12.7)
POTASSIUM SERPL-SCNC: 4.6 MMOL/L (ref 3.5–5.3)
PROT SERPL-MCNC: 6.8 G/DL (ref 6.4–8.4)
RBC # BLD AUTO: 4.33 MILLION/UL (ref 3.81–5.12)
SODIUM SERPL-SCNC: 140 MMOL/L (ref 135–147)
T4 FREE SERPL-MCNC: 1.22 NG/DL (ref 0.61–1.12)
TRIGL SERPL-MCNC: 95 MG/DL (ref ?–150)
TSH SERPL DL<=0.05 MIU/L-ACNC: 5.7 UIU/ML (ref 0.45–4.5)
WBC # BLD AUTO: 4.2 THOUSAND/UL (ref 4.31–10.16)

## 2025-05-13 PROCEDURE — 85025 COMPLETE CBC W/AUTO DIFF WBC: CPT

## 2025-05-13 PROCEDURE — 84439 ASSAY OF FREE THYROXINE: CPT

## 2025-05-13 PROCEDURE — 84480 ASSAY TRIIODOTHYRONINE (T3): CPT

## 2025-05-13 PROCEDURE — 36415 COLL VENOUS BLD VENIPUNCTURE: CPT

## 2025-05-13 PROCEDURE — 80053 COMPREHEN METABOLIC PANEL: CPT

## 2025-05-13 PROCEDURE — 84443 ASSAY THYROID STIM HORMONE: CPT

## 2025-05-13 PROCEDURE — 80061 LIPID PANEL: CPT

## 2025-05-14 ENCOUNTER — RESULTS FOLLOW-UP (OUTPATIENT)
Dept: FAMILY MEDICINE CLINIC | Facility: CLINIC | Age: 80
End: 2025-05-14

## 2025-05-14 DIAGNOSIS — E06.3 HYPOTHYROIDISM DUE TO HASHIMOTO THYROIDITIS: Primary | ICD-10-CM

## 2025-05-14 LAB — T3 SERPL-MCNC: 0.6 NG/ML (ref 0.9–1.8)

## 2025-05-16 DIAGNOSIS — E03.9 HYPOTHYROIDISM, UNSPECIFIED TYPE: Primary | ICD-10-CM

## 2025-05-27 DIAGNOSIS — J01.00 ACUTE NON-RECURRENT MAXILLARY SINUSITIS: Primary | ICD-10-CM

## 2025-05-27 RX ORDER — AMOXICILLIN 875 MG/1
875 TABLET, COATED ORAL 2 TIMES DAILY
Qty: 20 TABLET | Refills: 0 | Status: SHIPPED | OUTPATIENT
Start: 2025-05-27 | End: 2025-06-06

## 2025-06-02 DIAGNOSIS — E03.9 HYPOTHYROIDISM, UNSPECIFIED TYPE: Primary | ICD-10-CM

## 2025-06-03 ENCOUNTER — APPOINTMENT (OUTPATIENT)
Dept: LAB | Facility: AMBULARY SURGERY CENTER | Age: 80
End: 2025-06-03
Payer: COMMERCIAL

## 2025-06-03 DIAGNOSIS — E03.9 HYPOTHYROIDISM, UNSPECIFIED TYPE: ICD-10-CM

## 2025-06-03 PROCEDURE — 84436 ASSAY OF TOTAL THYROXINE: CPT

## 2025-06-03 PROCEDURE — 84439 ASSAY OF FREE THYROXINE: CPT | Performed by: FAMILY MEDICINE

## 2025-06-12 LAB — MISCELLANEOUS LAB TEST RESULT: NORMAL

## 2025-06-13 ENCOUNTER — RESULTS FOLLOW-UP (OUTPATIENT)
Dept: FAMILY MEDICINE CLINIC | Facility: CLINIC | Age: 80
End: 2025-06-13

## 2025-06-24 DIAGNOSIS — F41.9 ANXIETY: ICD-10-CM

## 2025-06-24 RX ORDER — LORAZEPAM 1 MG/1
1 TABLET ORAL
Qty: 30 TABLET | Refills: 1 | Status: SHIPPED | OUTPATIENT
Start: 2025-06-24

## 2025-06-24 NOTE — TELEPHONE ENCOUNTER
1 [id number] 05/29/2025 05/29/2025 05/02/2025 LORazepam (Tablet) 30.0 30 1 MG YOKO LUCIANO POGODZINSKI [facility] Medicare 1 / 1 PA    1 [id number] 05/05/2025 05/05/2025 05/02/2025 LORazepam (Tablet) 30.0 30 1 MG YOKO LUCIANO POGODZINSKI [facility] Medicare 0 / 1 PA    1 [id number] 04/07/2025 04/07/2025 03/10/2025 LORazepam (Tablet) 30.0 30 1 MG YOKO LUCIANO POGODZINSKI [facility] Medicare 1 / 1 PA    1 [id number] 03/10/2025 03/10/2025 03/10/2025 LORazepam (Tablet) 30.0 30 1 MG YOKO LUCIANO POGODZINSKI [facility] Medicare 0 / 1 PA    1 [id number] 02/10/2025 02/10/2025 01/03/2025 LORazepam (Tablet) 30.0 30 1 MG YOKO LUCIANO POGODZINSKI [facility] Medicare 1 / 1 PA    1 [id number] 01/06/2025 01/06/2025 01/03/2025 LORazepam (Tablet) 30.0 30 1 MG YOKO LUCIANO POGODZINSKI [facility] Medicare 0 / 1 PA    1 [id number] 11/21/2024 11/21/2024 10/16/2024 LORazepam (Tablet) 30.0 30 1 MG YOKO LUCIANO POGODZINSKI [facility] Medicare 1 / 1 PA    1 [id number] 10/17/2024 10/17/2024 10/16/2024 LORazepam (Tablet) 30.0 30 1 MG YOKO LUCIANO POGODZINSKI [facility] Medicare 0 / 1 PA    1 [id number] 09/19/2024 09/19/2024 09/18/2024 LORazepam (Tablet) 30.0 30 1 MG YOKO LUCIANO POGODZINSKI [facility] Medicare 0 / 0 PA    1 [id number] 08/20/2024 08/20/2024 07/24/2024 LORazepam (Tablet) 30.0 30 1 MG YOKO LUCIANO POGODZINSKI [facility] Medicare 1 / 1 PA

## 2025-07-27 DIAGNOSIS — J30.9 CHRONIC ALLERGIC RHINITIS: ICD-10-CM

## 2025-07-29 RX ORDER — MOMETASONE FUROATE MONOHYDRATE 50 UG/1
SPRAY, METERED NASAL
Qty: 51 G | Refills: 1 | Status: SHIPPED | OUTPATIENT
Start: 2025-07-29

## 2025-08-13 ENCOUNTER — APPOINTMENT (OUTPATIENT)
Dept: LAB | Facility: CLINIC | Age: 80
End: 2025-08-13
Payer: COMMERCIAL

## 2025-08-13 DIAGNOSIS — E03.9 HYPOTHYROIDISM, UNSPECIFIED TYPE: ICD-10-CM

## 2025-08-13 LAB — TSH SERPL DL<=0.05 MIU/L-ACNC: 3.55 UIU/ML (ref 0.45–4.5)

## 2025-08-13 PROCEDURE — 84443 ASSAY THYROID STIM HORMONE: CPT

## 2025-08-13 PROCEDURE — 36415 COLL VENOUS BLD VENIPUNCTURE: CPT

## 2025-08-14 RX ORDER — SODIUM FLUORIDE1.1%, POTASSIUM NITRATE 5% 5.8; 57.5 MG/ML; MG/ML
GEL, DENTIFRICE DENTAL
COMMUNITY
Start: 2025-07-30

## 2025-08-19 ENCOUNTER — OFFICE VISIT (OUTPATIENT)
Dept: ENDOCRINOLOGY | Facility: CLINIC | Age: 80
End: 2025-08-19
Payer: COMMERCIAL

## 2025-08-19 VITALS
WEIGHT: 155 LBS | HEART RATE: 85 BPM | OXYGEN SATURATION: 99 % | BODY MASS INDEX: 28.52 KG/M2 | HEIGHT: 62 IN | SYSTOLIC BLOOD PRESSURE: 130 MMHG | DIASTOLIC BLOOD PRESSURE: 80 MMHG

## 2025-08-19 DIAGNOSIS — E03.9 HYPOTHYROIDISM, UNSPECIFIED TYPE: Primary | ICD-10-CM

## 2025-08-19 DIAGNOSIS — E04.2 MULTIPLE THYROID NODULES: ICD-10-CM

## 2025-08-19 PROCEDURE — 99204 OFFICE O/P NEW MOD 45 MIN: CPT | Performed by: INTERNAL MEDICINE

## 2025-08-19 PROCEDURE — G2211 COMPLEX E/M VISIT ADD ON: HCPCS | Performed by: INTERNAL MEDICINE

## (undated) DEVICE — ELECTRODE BLADE MOD E-Z CLEAN  2.75IN 7CM -0012AM

## (undated) DEVICE — REM POLYHESIVE ADULT PATIENT RETURN ELECTRODE: Brand: VALLEYLAB

## (undated) DEVICE — SUT ETHILON 6-0 PC-1 18 IN 1856G

## (undated) DEVICE — ELECTRODE BLADE MOD E-Z CLEAN 2.5IN 6.4CM -0012M

## (undated) DEVICE — 3M™ TEGADERM™ TRANSPARENT FILM DRESSING FRAME STYLE, 1626W, 4 IN X 4-3/4 IN (10 CM X 12 CM), 50/CT 4CT/CASE: Brand: 3M™ TEGADERM™

## (undated) DEVICE — INTENDED FOR TISSUE SEPARATION, AND OTHER PROCEDURES THAT REQUIRE A SHARP SURGICAL BLADE TO PUNCTURE OR CUT.: Brand: BARD-PARKER ® CARBON RIB-BACK BLADES

## (undated) DEVICE — SUT SILK 5-0 P-3 18 IN 640G

## (undated) DEVICE — BETHLEHEM UNIVERSAL OUTPATIENT: Brand: CARDINAL HEALTH

## (undated) DEVICE — GAUZE SPONGES,16 PLY: Brand: CURITY

## (undated) DEVICE — VIAL DECANTER

## (undated) DEVICE — INSULATED NEEDLE ELECTRODE: Brand: EDGE

## (undated) DEVICE — SCD SEQUENTIAL COMPRESSION COMFORT SLEEVE MEDIUM KNEE LENGTH: Brand: KENDALL SCD

## (undated) DEVICE — 3M™ STERI-STRIP™ REINFORCED ADHESIVE SKIN CLOSURES, R1547, 1/2 IN X 4 IN (12 MM X 100 MM), 6 STRIPS/ENVELOPE: Brand: 3M™ STERI-STRIP™

## (undated) DEVICE — NEEDLE 27 G X 1 1/4

## (undated) DEVICE — TIBURON SPLIT SHEET: Brand: CONVERTORS

## (undated) DEVICE — POV-IOD SWAB STICKS

## (undated) DEVICE — NEEDLE 25G X 1 1/2

## (undated) DEVICE — ELECTRODE NEEDLE MEGAFINE 2IN E-Z CLEAN MEGADYNE -0118

## (undated) DEVICE — SPONGE STICK WITH PVP-I: Brand: KENDALL

## (undated) DEVICE — LIGHT HANDLE COVER SLEEVE DISP BLUE STELLAR

## (undated) DEVICE — BULB SYRINGE,IRRIGATION WITH PROTECTIVE CAP: Brand: DOVER

## (undated) DEVICE — TUBING SUCTION 5MM X 12 FT

## (undated) DEVICE — GLOVE SRG BIOGEL 7